# Patient Record
Sex: FEMALE | Race: OTHER | HISPANIC OR LATINO | URBAN - METROPOLITAN AREA
[De-identification: names, ages, dates, MRNs, and addresses within clinical notes are randomized per-mention and may not be internally consistent; named-entity substitution may affect disease eponyms.]

---

## 2017-08-04 ENCOUNTER — EMERGENCY (EMERGENCY)
Facility: HOSPITAL | Age: 67
LOS: 0 days | Discharge: HOME | End: 2017-08-05

## 2017-08-04 DIAGNOSIS — I10 ESSENTIAL (PRIMARY) HYPERTENSION: ICD-10-CM

## 2017-08-04 DIAGNOSIS — Z90.49 ACQUIRED ABSENCE OF OTHER SPECIFIED PARTS OF DIGESTIVE TRACT: ICD-10-CM

## 2017-08-04 DIAGNOSIS — R07.9 CHEST PAIN, UNSPECIFIED: ICD-10-CM

## 2017-08-04 DIAGNOSIS — Z79.899 OTHER LONG TERM (CURRENT) DRUG THERAPY: ICD-10-CM

## 2017-08-04 DIAGNOSIS — E11.9 TYPE 2 DIABETES MELLITUS WITHOUT COMPLICATIONS: ICD-10-CM

## 2017-08-04 DIAGNOSIS — J45.909 UNSPECIFIED ASTHMA, UNCOMPLICATED: ICD-10-CM

## 2017-08-04 DIAGNOSIS — J18.9 PNEUMONIA, UNSPECIFIED ORGANISM: ICD-10-CM

## 2017-08-04 DIAGNOSIS — Z79.82 LONG TERM (CURRENT) USE OF ASPIRIN: ICD-10-CM

## 2017-08-04 DIAGNOSIS — R50.9 FEVER, UNSPECIFIED: ICD-10-CM

## 2017-08-04 DIAGNOSIS — Z91.041 RADIOGRAPHIC DYE ALLERGY STATUS: ICD-10-CM

## 2017-08-04 DIAGNOSIS — R19.7 DIARRHEA, UNSPECIFIED: ICD-10-CM

## 2017-08-04 DIAGNOSIS — E78.00 PURE HYPERCHOLESTEROLEMIA, UNSPECIFIED: ICD-10-CM

## 2017-08-04 DIAGNOSIS — E78.5 HYPERLIPIDEMIA, UNSPECIFIED: ICD-10-CM

## 2017-08-04 DIAGNOSIS — Z88.8 ALLERGY STATUS TO OTHER DRUGS, MEDICAMENTS AND BIOLOGICAL SUBSTANCES STATUS: ICD-10-CM

## 2017-08-11 ENCOUNTER — INPATIENT (INPATIENT)
Facility: HOSPITAL | Age: 67
LOS: 2 days | Discharge: HOME | End: 2017-08-14
Attending: INTERNAL MEDICINE

## 2017-08-11 DIAGNOSIS — I10 ESSENTIAL (PRIMARY) HYPERTENSION: ICD-10-CM

## 2017-08-11 DIAGNOSIS — R07.9 CHEST PAIN, UNSPECIFIED: ICD-10-CM

## 2017-08-11 DIAGNOSIS — E78.5 HYPERLIPIDEMIA, UNSPECIFIED: ICD-10-CM

## 2017-08-11 DIAGNOSIS — E11.9 TYPE 2 DIABETES MELLITUS WITHOUT COMPLICATIONS: ICD-10-CM

## 2017-08-16 DIAGNOSIS — Z95.5 PRESENCE OF CORONARY ANGIOPLASTY IMPLANT AND GRAFT: ICD-10-CM

## 2017-08-16 DIAGNOSIS — Z79.84 LONG TERM (CURRENT) USE OF ORAL HYPOGLYCEMIC DRUGS: ICD-10-CM

## 2017-08-16 DIAGNOSIS — R19.7 DIARRHEA, UNSPECIFIED: ICD-10-CM

## 2017-08-16 DIAGNOSIS — Z91.041 RADIOGRAPHIC DYE ALLERGY STATUS: ICD-10-CM

## 2017-08-16 DIAGNOSIS — E78.5 HYPERLIPIDEMIA, UNSPECIFIED: ICD-10-CM

## 2017-08-16 DIAGNOSIS — E78.00 PURE HYPERCHOLESTEROLEMIA, UNSPECIFIED: ICD-10-CM

## 2017-08-16 DIAGNOSIS — I10 ESSENTIAL (PRIMARY) HYPERTENSION: ICD-10-CM

## 2017-08-16 DIAGNOSIS — E86.0 DEHYDRATION: ICD-10-CM

## 2017-08-16 DIAGNOSIS — I25.10 ATHEROSCLEROTIC HEART DISEASE OF NATIVE CORONARY ARTERY WITHOUT ANGINA PECTORIS: ICD-10-CM

## 2017-08-16 DIAGNOSIS — E03.9 HYPOTHYROIDISM, UNSPECIFIED: ICD-10-CM

## 2017-08-16 DIAGNOSIS — J18.9 PNEUMONIA, UNSPECIFIED ORGANISM: ICD-10-CM

## 2017-08-16 DIAGNOSIS — E83.42 HYPOMAGNESEMIA: ICD-10-CM

## 2017-08-16 DIAGNOSIS — K21.9 GASTRO-ESOPHAGEAL REFLUX DISEASE WITHOUT ESOPHAGITIS: ICD-10-CM

## 2017-08-16 DIAGNOSIS — E11.9 TYPE 2 DIABETES MELLITUS WITHOUT COMPLICATIONS: ICD-10-CM

## 2017-08-16 DIAGNOSIS — J45.909 UNSPECIFIED ASTHMA, UNCOMPLICATED: ICD-10-CM

## 2017-10-29 ENCOUNTER — INPATIENT (INPATIENT)
Facility: HOSPITAL | Age: 67
LOS: 3 days | Discharge: HOME | End: 2017-11-02
Attending: INTERNAL MEDICINE | Admitting: INTERNAL MEDICINE

## 2017-10-29 DIAGNOSIS — E11.9 TYPE 2 DIABETES MELLITUS WITHOUT COMPLICATIONS: ICD-10-CM

## 2017-10-29 DIAGNOSIS — R07.9 CHEST PAIN, UNSPECIFIED: ICD-10-CM

## 2017-10-29 DIAGNOSIS — E78.5 HYPERLIPIDEMIA, UNSPECIFIED: ICD-10-CM

## 2017-10-29 DIAGNOSIS — I10 ESSENTIAL (PRIMARY) HYPERTENSION: ICD-10-CM

## 2017-11-07 DIAGNOSIS — Z95.5 PRESENCE OF CORONARY ANGIOPLASTY IMPLANT AND GRAFT: ICD-10-CM

## 2017-11-07 DIAGNOSIS — E03.9 HYPOTHYROIDISM, UNSPECIFIED: ICD-10-CM

## 2017-11-07 DIAGNOSIS — K21.9 GASTRO-ESOPHAGEAL REFLUX DISEASE WITHOUT ESOPHAGITIS: ICD-10-CM

## 2017-11-07 DIAGNOSIS — R07.9 CHEST PAIN, UNSPECIFIED: ICD-10-CM

## 2017-11-07 DIAGNOSIS — I25.110 ATHEROSCLEROTIC HEART DISEASE OF NATIVE CORONARY ARTERY WITH UNSTABLE ANGINA PECTORIS: ICD-10-CM

## 2017-11-07 DIAGNOSIS — E78.5 HYPERLIPIDEMIA, UNSPECIFIED: ICD-10-CM

## 2017-11-07 DIAGNOSIS — E11.9 TYPE 2 DIABETES MELLITUS WITHOUT COMPLICATIONS: ICD-10-CM

## 2017-11-07 DIAGNOSIS — Z79.84 LONG TERM (CURRENT) USE OF ORAL HYPOGLYCEMIC DRUGS: ICD-10-CM

## 2017-11-07 DIAGNOSIS — I10 ESSENTIAL (PRIMARY) HYPERTENSION: ICD-10-CM

## 2017-11-07 DIAGNOSIS — I48.91 UNSPECIFIED ATRIAL FIBRILLATION: ICD-10-CM

## 2017-11-16 ENCOUNTER — EMERGENCY (EMERGENCY)
Facility: HOSPITAL | Age: 67
LOS: 0 days | Discharge: HOME | End: 2017-11-16
Admitting: INTERNAL MEDICINE

## 2017-11-16 DIAGNOSIS — Z90.49 ACQUIRED ABSENCE OF OTHER SPECIFIED PARTS OF DIGESTIVE TRACT: ICD-10-CM

## 2017-11-16 DIAGNOSIS — R07.9 CHEST PAIN, UNSPECIFIED: ICD-10-CM

## 2017-11-16 DIAGNOSIS — J18.9 PNEUMONIA, UNSPECIFIED ORGANISM: ICD-10-CM

## 2017-11-16 DIAGNOSIS — Z98.61 CORONARY ANGIOPLASTY STATUS: ICD-10-CM

## 2017-11-16 DIAGNOSIS — Z91.041 RADIOGRAPHIC DYE ALLERGY STATUS: ICD-10-CM

## 2017-11-16 DIAGNOSIS — Z88.8 ALLERGY STATUS TO OTHER DRUGS, MEDICAMENTS AND BIOLOGICAL SUBSTANCES STATUS: ICD-10-CM

## 2017-11-16 DIAGNOSIS — R05 COUGH: ICD-10-CM

## 2017-11-16 DIAGNOSIS — E11.9 TYPE 2 DIABETES MELLITUS WITHOUT COMPLICATIONS: ICD-10-CM

## 2017-11-16 DIAGNOSIS — Z79.899 OTHER LONG TERM (CURRENT) DRUG THERAPY: ICD-10-CM

## 2017-11-16 DIAGNOSIS — I10 ESSENTIAL (PRIMARY) HYPERTENSION: ICD-10-CM

## 2017-11-16 DIAGNOSIS — Z79.82 LONG TERM (CURRENT) USE OF ASPIRIN: ICD-10-CM

## 2017-11-16 DIAGNOSIS — J45.909 UNSPECIFIED ASTHMA, UNCOMPLICATED: ICD-10-CM

## 2017-11-16 DIAGNOSIS — E78.5 HYPERLIPIDEMIA, UNSPECIFIED: ICD-10-CM

## 2017-11-16 DIAGNOSIS — E78.00 PURE HYPERCHOLESTEROLEMIA, UNSPECIFIED: ICD-10-CM

## 2017-12-16 ENCOUNTER — EMERGENCY (EMERGENCY)
Facility: HOSPITAL | Age: 67
LOS: 0 days | Discharge: HOME | End: 2017-12-16
Admitting: INTERNAL MEDICINE

## 2017-12-16 DIAGNOSIS — Z91.041 RADIOGRAPHIC DYE ALLERGY STATUS: ICD-10-CM

## 2017-12-16 DIAGNOSIS — Z79.899 OTHER LONG TERM (CURRENT) DRUG THERAPY: ICD-10-CM

## 2017-12-16 DIAGNOSIS — R05 COUGH: ICD-10-CM

## 2017-12-16 DIAGNOSIS — J90 PLEURAL EFFUSION, NOT ELSEWHERE CLASSIFIED: ICD-10-CM

## 2017-12-16 DIAGNOSIS — R07.9 CHEST PAIN, UNSPECIFIED: ICD-10-CM

## 2017-12-16 DIAGNOSIS — Z79.82 LONG TERM (CURRENT) USE OF ASPIRIN: ICD-10-CM

## 2017-12-16 DIAGNOSIS — Z90.49 ACQUIRED ABSENCE OF OTHER SPECIFIED PARTS OF DIGESTIVE TRACT: ICD-10-CM

## 2017-12-16 DIAGNOSIS — E78.5 HYPERLIPIDEMIA, UNSPECIFIED: ICD-10-CM

## 2017-12-16 DIAGNOSIS — Z79.84 LONG TERM (CURRENT) USE OF ORAL HYPOGLYCEMIC DRUGS: ICD-10-CM

## 2017-12-16 DIAGNOSIS — I10 ESSENTIAL (PRIMARY) HYPERTENSION: ICD-10-CM

## 2017-12-16 DIAGNOSIS — E11.9 TYPE 2 DIABETES MELLITUS WITHOUT COMPLICATIONS: ICD-10-CM

## 2017-12-16 DIAGNOSIS — T46.4X5A ADVERSE EFFECT OF ANGIOTENSIN-CONVERTING-ENZYME INHIBITORS, INITIAL ENCOUNTER: ICD-10-CM

## 2017-12-16 DIAGNOSIS — J45.909 UNSPECIFIED ASTHMA, UNCOMPLICATED: ICD-10-CM

## 2017-12-16 DIAGNOSIS — Z88.8 ALLERGY STATUS TO OTHER DRUGS, MEDICAMENTS AND BIOLOGICAL SUBSTANCES: ICD-10-CM

## 2018-01-03 ENCOUNTER — OUTPATIENT (OUTPATIENT)
Dept: OUTPATIENT SERVICES | Facility: HOSPITAL | Age: 68
LOS: 1 days | Discharge: HOME | End: 2018-01-03

## 2018-01-03 DIAGNOSIS — E11.9 TYPE 2 DIABETES MELLITUS WITHOUT COMPLICATIONS: ICD-10-CM

## 2018-01-03 DIAGNOSIS — Z53.9 PROCEDURE AND TREATMENT NOT CARRIED OUT, UNSPECIFIED REASON: ICD-10-CM

## 2018-01-03 DIAGNOSIS — E78.5 HYPERLIPIDEMIA, UNSPECIFIED: ICD-10-CM

## 2018-01-03 DIAGNOSIS — R07.9 CHEST PAIN, UNSPECIFIED: ICD-10-CM

## 2018-01-03 DIAGNOSIS — J90 PLEURAL EFFUSION, NOT ELSEWHERE CLASSIFIED: ICD-10-CM

## 2018-01-03 DIAGNOSIS — I10 ESSENTIAL (PRIMARY) HYPERTENSION: ICD-10-CM

## 2018-02-22 ENCOUNTER — APPOINTMENT (OUTPATIENT)
Dept: CARDIOLOGY | Facility: CLINIC | Age: 68
End: 2018-02-22

## 2018-02-22 VITALS — WEIGHT: 182 LBS

## 2018-02-22 VITALS — SYSTOLIC BLOOD PRESSURE: 134 MMHG | BODY MASS INDEX: 34.39 KG/M2 | DIASTOLIC BLOOD PRESSURE: 90 MMHG | HEIGHT: 61 IN

## 2018-02-22 DIAGNOSIS — E03.9 HYPOTHYROIDISM, UNSPECIFIED: ICD-10-CM

## 2018-02-22 DIAGNOSIS — Z98.890 OTHER SPECIFIED POSTPROCEDURAL STATES: ICD-10-CM

## 2018-02-22 DIAGNOSIS — Z86.39 PERSONAL HISTORY OF OTHER ENDOCRINE, NUTRITIONAL AND METABOLIC DISEASE: ICD-10-CM

## 2018-02-22 DIAGNOSIS — C96.6 UNIFOCAL LANGERHANS-CELL HISTIOCYTOSIS: ICD-10-CM

## 2018-02-22 DIAGNOSIS — Z82.49 FAMILY HISTORY OF ISCHEMIC HEART DISEASE AND OTHER DISEASES OF THE CIRCULATORY SYSTEM: ICD-10-CM

## 2018-02-22 DIAGNOSIS — Z86.79 PERSONAL HISTORY OF OTHER DISEASES OF THE CIRCULATORY SYSTEM: ICD-10-CM

## 2018-04-05 ENCOUNTER — APPOINTMENT (OUTPATIENT)
Dept: CARDIOLOGY | Facility: CLINIC | Age: 68
End: 2018-04-05

## 2018-04-17 ENCOUNTER — APPOINTMENT (OUTPATIENT)
Dept: CARDIOLOGY | Facility: CLINIC | Age: 68
End: 2018-04-17

## 2018-05-14 ENCOUNTER — APPOINTMENT (OUTPATIENT)
Dept: CARDIOLOGY | Facility: CLINIC | Age: 68
End: 2018-05-14

## 2018-05-14 VITALS — SYSTOLIC BLOOD PRESSURE: 109 MMHG | DIASTOLIC BLOOD PRESSURE: 57 MMHG

## 2018-07-23 ENCOUNTER — INPATIENT (INPATIENT)
Facility: HOSPITAL | Age: 68
LOS: 2 days | Discharge: HOME | End: 2018-07-26
Attending: INTERNAL MEDICINE | Admitting: INTERNAL MEDICINE

## 2018-07-23 VITALS
OXYGEN SATURATION: 100 % | RESPIRATION RATE: 18 BRPM | TEMPERATURE: 96 F | DIASTOLIC BLOOD PRESSURE: 93 MMHG | HEART RATE: 79 BPM | SYSTOLIC BLOOD PRESSURE: 202 MMHG

## 2018-07-23 DIAGNOSIS — Z95.5 PRESENCE OF CORONARY ANGIOPLASTY IMPLANT AND GRAFT: Chronic | ICD-10-CM

## 2018-07-23 LAB
ALBUMIN SERPL ELPH-MCNC: 4.3 G/DL — SIGNIFICANT CHANGE UP (ref 3.5–5.2)
ALP SERPL-CCNC: 64 U/L — SIGNIFICANT CHANGE UP (ref 30–115)
ALT FLD-CCNC: 13 U/L — SIGNIFICANT CHANGE UP (ref 0–41)
ANION GAP SERPL CALC-SCNC: 13 MMOL/L — SIGNIFICANT CHANGE UP (ref 7–14)
APTT BLD: 31.9 SEC — SIGNIFICANT CHANGE UP (ref 27–39.2)
AST SERPL-CCNC: 20 U/L — SIGNIFICANT CHANGE UP (ref 0–41)
BASOPHILS # BLD AUTO: 0.04 K/UL — SIGNIFICANT CHANGE UP (ref 0–0.2)
BASOPHILS NFR BLD AUTO: 0.9 % — SIGNIFICANT CHANGE UP (ref 0–1)
BILIRUB SERPL-MCNC: 2.1 MG/DL — HIGH (ref 0.2–1.2)
BUN SERPL-MCNC: 24 MG/DL — HIGH (ref 10–20)
CALCIUM SERPL-MCNC: 9.9 MG/DL — SIGNIFICANT CHANGE UP (ref 8.5–10.1)
CHLORIDE SERPL-SCNC: 101 MMOL/L — SIGNIFICANT CHANGE UP (ref 98–110)
CK MB CFR SERPL CALC: 2.6 NG/ML — SIGNIFICANT CHANGE UP (ref 0.6–6.3)
CK SERPL-CCNC: 121 U/L — SIGNIFICANT CHANGE UP (ref 0–225)
CO2 SERPL-SCNC: 28 MMOL/L — SIGNIFICANT CHANGE UP (ref 17–32)
CREAT SERPL-MCNC: 0.9 MG/DL — SIGNIFICANT CHANGE UP (ref 0.7–1.5)
EOSINOPHIL # BLD AUTO: 0.18 K/UL — SIGNIFICANT CHANGE UP (ref 0–0.7)
EOSINOPHIL NFR BLD AUTO: 4.1 % — SIGNIFICANT CHANGE UP (ref 0–8)
GLUCOSE SERPL-MCNC: 122 MG/DL — HIGH (ref 70–99)
HCT VFR BLD CALC: 35 % — LOW (ref 37–47)
HGB BLD-MCNC: 12 G/DL — SIGNIFICANT CHANGE UP (ref 12–16)
IMM GRANULOCYTES NFR BLD AUTO: 0.2 % — SIGNIFICANT CHANGE UP (ref 0.1–0.3)
INR BLD: 1.01 RATIO — SIGNIFICANT CHANGE UP (ref 0.65–1.3)
LYMPHOCYTES # BLD AUTO: 1.98 K/UL — SIGNIFICANT CHANGE UP (ref 1.2–3.4)
LYMPHOCYTES # BLD AUTO: 45 % — SIGNIFICANT CHANGE UP (ref 20.5–51.1)
MCHC RBC-ENTMCNC: 30.7 PG — SIGNIFICANT CHANGE UP (ref 27–31)
MCHC RBC-ENTMCNC: 34.3 G/DL — SIGNIFICANT CHANGE UP (ref 32–37)
MCV RBC AUTO: 89.5 FL — SIGNIFICANT CHANGE UP (ref 81–99)
MONOCYTES # BLD AUTO: 0.45 K/UL — SIGNIFICANT CHANGE UP (ref 0.1–0.6)
MONOCYTES NFR BLD AUTO: 10.2 % — HIGH (ref 1.7–9.3)
NEUTROPHILS # BLD AUTO: 1.74 K/UL — SIGNIFICANT CHANGE UP (ref 1.4–6.5)
NEUTROPHILS NFR BLD AUTO: 39.6 % — LOW (ref 42.2–75.2)
NRBC # BLD: 0 /100 WBCS — SIGNIFICANT CHANGE UP (ref 0–0)
PLATELET # BLD AUTO: 259 K/UL — SIGNIFICANT CHANGE UP (ref 130–400)
POTASSIUM SERPL-MCNC: 4.7 MMOL/L — SIGNIFICANT CHANGE UP (ref 3.5–5)
POTASSIUM SERPL-SCNC: 4.7 MMOL/L — SIGNIFICANT CHANGE UP (ref 3.5–5)
PROT SERPL-MCNC: 7.6 G/DL — SIGNIFICANT CHANGE UP (ref 6–8)
PROTHROM AB SERPL-ACNC: 10.9 SEC — SIGNIFICANT CHANGE UP (ref 9.95–12.87)
RBC # BLD: 3.91 M/UL — LOW (ref 4.2–5.4)
RBC # FLD: 13.9 % — SIGNIFICANT CHANGE UP (ref 11.5–14.5)
SODIUM SERPL-SCNC: 142 MMOL/L — SIGNIFICANT CHANGE UP (ref 135–146)
TROPONIN T SERPL-MCNC: <0.01 NG/ML — SIGNIFICANT CHANGE UP
WBC # BLD: 4.4 K/UL — LOW (ref 4.8–10.8)
WBC # FLD AUTO: 4.4 K/UL — LOW (ref 4.8–10.8)

## 2018-07-23 RX ORDER — SODIUM CHLORIDE 9 MG/ML
1000 INJECTION INTRAMUSCULAR; INTRAVENOUS; SUBCUTANEOUS
Qty: 0 | Refills: 0 | Status: DISCONTINUED | OUTPATIENT
Start: 2018-07-23 | End: 2018-07-24

## 2018-07-23 RX ADMIN — SODIUM CHLORIDE 125 MILLILITER(S): 9 INJECTION INTRAMUSCULAR; INTRAVENOUS; SUBCUTANEOUS at 22:30

## 2018-07-23 NOTE — ED ADULT TRIAGE NOTE - CHIEF COMPLAINT QUOTE
witnessed syncope episode at home, now alert and oriented x 3, in no distress. witnessed syncope episode at home, now alert and oriented x 3, in no distress. [id number].

## 2018-07-23 NOTE — ED ADULT NURSE NOTE - PMH
Arthritis    Asthma    Atrial fibrillation    Coronary heart disease    Diabetes    GERD (gastroesophageal reflux disease)    High cholesterol    Hypertension    Hypothyroid

## 2018-07-23 NOTE — ED ADULT NURSE NOTE - OBJECTIVE STATEMENT
Patient states she was at home cooking with family and suddenly saw black and fell but  caught her. No injury posy syncope episode. Patient is a/o x3, ambulatory, currently attached to cardiac and spo2 monitor, patient has c/o pressure to front of head and mild light handedness. no distress at this time, will monitor.

## 2018-07-24 LAB
ANION GAP SERPL CALC-SCNC: 10 MMOL/L — SIGNIFICANT CHANGE UP (ref 7–14)
BASOPHILS # BLD AUTO: 0.02 K/UL — SIGNIFICANT CHANGE UP (ref 0–0.2)
BASOPHILS NFR BLD AUTO: 0.7 % — SIGNIFICANT CHANGE UP (ref 0–1)
BUN SERPL-MCNC: 17 MG/DL — SIGNIFICANT CHANGE UP (ref 10–20)
CALCIUM SERPL-MCNC: 8.9 MG/DL — SIGNIFICANT CHANGE UP (ref 8.5–10.1)
CHLORIDE SERPL-SCNC: 102 MMOL/L — SIGNIFICANT CHANGE UP (ref 98–110)
CO2 SERPL-SCNC: 28 MMOL/L — SIGNIFICANT CHANGE UP (ref 17–32)
CREAT SERPL-MCNC: 0.7 MG/DL — SIGNIFICANT CHANGE UP (ref 0.7–1.5)
EOSINOPHIL # BLD AUTO: 0.09 K/UL — SIGNIFICANT CHANGE UP (ref 0–0.7)
EOSINOPHIL NFR BLD AUTO: 3.2 % — SIGNIFICANT CHANGE UP (ref 0–8)
GLUCOSE SERPL-MCNC: 145 MG/DL — HIGH (ref 70–99)
HCT VFR BLD CALC: 32.2 % — LOW (ref 37–47)
HGB BLD-MCNC: 10.9 G/DL — LOW (ref 12–16)
IMM GRANULOCYTES NFR BLD AUTO: 0 % — LOW (ref 0.1–0.3)
LYMPHOCYTES # BLD AUTO: 1.2 K/UL — SIGNIFICANT CHANGE UP (ref 1.2–3.4)
LYMPHOCYTES # BLD AUTO: 43.2 % — SIGNIFICANT CHANGE UP (ref 20.5–51.1)
MCHC RBC-ENTMCNC: 30.4 PG — SIGNIFICANT CHANGE UP (ref 27–31)
MCHC RBC-ENTMCNC: 33.9 G/DL — SIGNIFICANT CHANGE UP (ref 32–37)
MCV RBC AUTO: 89.7 FL — SIGNIFICANT CHANGE UP (ref 81–99)
MONOCYTES # BLD AUTO: 0.33 K/UL — SIGNIFICANT CHANGE UP (ref 0.1–0.6)
MONOCYTES NFR BLD AUTO: 11.9 % — HIGH (ref 1.7–9.3)
NEUTROPHILS # BLD AUTO: 1.14 K/UL — LOW (ref 1.4–6.5)
NEUTROPHILS NFR BLD AUTO: 41 % — LOW (ref 42.2–75.2)
NRBC # BLD: 0 /100 WBCS — SIGNIFICANT CHANGE UP (ref 0–0)
PLATELET # BLD AUTO: 217 K/UL — SIGNIFICANT CHANGE UP (ref 130–400)
POTASSIUM SERPL-MCNC: 3.9 MMOL/L — SIGNIFICANT CHANGE UP (ref 3.5–5)
POTASSIUM SERPL-SCNC: 3.9 MMOL/L — SIGNIFICANT CHANGE UP (ref 3.5–5)
RBC # BLD: 3.59 M/UL — LOW (ref 4.2–5.4)
RBC # FLD: 13.6 % — SIGNIFICANT CHANGE UP (ref 11.5–14.5)
SODIUM SERPL-SCNC: 140 MMOL/L — SIGNIFICANT CHANGE UP (ref 135–146)
WBC # BLD: 2.78 K/UL — LOW (ref 4.8–10.8)
WBC # FLD AUTO: 2.78 K/UL — LOW (ref 4.8–10.8)

## 2018-07-24 RX ORDER — ATORVASTATIN CALCIUM 80 MG/1
80 TABLET, FILM COATED ORAL AT BEDTIME
Qty: 0 | Refills: 0 | Status: DISCONTINUED | OUTPATIENT
Start: 2018-07-24 | End: 2018-07-26

## 2018-07-24 RX ORDER — SOTALOL HCL 120 MG
80 TABLET ORAL ONCE
Qty: 0 | Refills: 0 | Status: COMPLETED | OUTPATIENT
Start: 2018-07-24 | End: 2018-07-24

## 2018-07-24 RX ORDER — PANTOPRAZOLE SODIUM 20 MG/1
40 TABLET, DELAYED RELEASE ORAL
Qty: 0 | Refills: 0 | Status: DISCONTINUED | OUTPATIENT
Start: 2018-07-24 | End: 2018-07-26

## 2018-07-24 RX ORDER — SOTALOL HCL 120 MG
80 TABLET ORAL
Qty: 0 | Refills: 0 | Status: DISCONTINUED | OUTPATIENT
Start: 2018-07-24 | End: 2018-07-25

## 2018-07-24 RX ORDER — ASPIRIN/CALCIUM CARB/MAGNESIUM 324 MG
81 TABLET ORAL DAILY
Qty: 0 | Refills: 0 | Status: DISCONTINUED | OUTPATIENT
Start: 2018-07-24 | End: 2018-07-26

## 2018-07-24 RX ORDER — HEPARIN SODIUM 5000 [USP'U]/ML
5000 INJECTION INTRAVENOUS; SUBCUTANEOUS EVERY 8 HOURS
Qty: 0 | Refills: 0 | Status: DISCONTINUED | OUTPATIENT
Start: 2018-07-24 | End: 2018-07-26

## 2018-07-24 RX ORDER — LEVOTHYROXINE SODIUM 125 MCG
88 TABLET ORAL DAILY
Qty: 0 | Refills: 0 | Status: DISCONTINUED | OUTPATIENT
Start: 2018-07-24 | End: 2018-07-26

## 2018-07-24 RX ORDER — LOSARTAN POTASSIUM 100 MG/1
50 TABLET, FILM COATED ORAL DAILY
Qty: 0 | Refills: 0 | Status: DISCONTINUED | OUTPATIENT
Start: 2018-07-24 | End: 2018-07-25

## 2018-07-24 RX ORDER — ACETAMINOPHEN 500 MG
650 TABLET ORAL EVERY 6 HOURS
Qty: 0 | Refills: 0 | Status: DISCONTINUED | OUTPATIENT
Start: 2018-07-24 | End: 2018-07-26

## 2018-07-24 RX ADMIN — HEPARIN SODIUM 5000 UNIT(S): 5000 INJECTION INTRAVENOUS; SUBCUTANEOUS at 17:34

## 2018-07-24 RX ADMIN — Medication 650 MILLIGRAM(S): at 21:25

## 2018-07-24 RX ADMIN — PANTOPRAZOLE SODIUM 40 MILLIGRAM(S): 20 TABLET, DELAYED RELEASE ORAL at 08:23

## 2018-07-24 RX ADMIN — Medication 81 MILLIGRAM(S): at 11:21

## 2018-07-24 RX ADMIN — HEPARIN SODIUM 5000 UNIT(S): 5000 INJECTION INTRAVENOUS; SUBCUTANEOUS at 21:25

## 2018-07-24 RX ADMIN — Medication 80 MILLIGRAM(S): at 02:45

## 2018-07-24 RX ADMIN — Medication 88 MICROGRAM(S): at 06:29

## 2018-07-24 RX ADMIN — Medication 650 MILLIGRAM(S): at 23:07

## 2018-07-24 RX ADMIN — Medication 80 MILLIGRAM(S): at 17:34

## 2018-07-24 RX ADMIN — HEPARIN SODIUM 5000 UNIT(S): 5000 INJECTION INTRAVENOUS; SUBCUTANEOUS at 06:30

## 2018-07-24 RX ADMIN — LOSARTAN POTASSIUM 50 MILLIGRAM(S): 100 TABLET, FILM COATED ORAL at 06:29

## 2018-07-24 RX ADMIN — ATORVASTATIN CALCIUM 80 MILLIGRAM(S): 80 TABLET, FILM COATED ORAL at 21:25

## 2018-07-24 NOTE — PROGRESS NOTE ADULT - ASSESSMENT
Episode of Syncope, R/O cardiac vs neuro etiology vs vasovagal  Episodes of epistaxis, Eliquis on hold  Hx of HTN, ASHD, CAD sp stent, parox afib, + AC with Eliquis  Hx of Hyperlipidemia  Hx of Hypothyroidism  Hx of DM II  Hx of POA, DDD, DJD    place pt on telemetry, CE, ECHO  cardio consult  check orthostatics, stool guiac, TSH  CT of head shows white mater changes and no acute intracranial pathology  Neuro consult ? further w/up  monitor H/H and Plts  Eliquis on hold  cont all meds  NB:  pt is a [employer]/ no blood or blood products

## 2018-07-24 NOTE — ED PROVIDER NOTE - OBJECTIVE STATEMENT
67 yo female hx of CAD/Arthritis/HTN/HLD/Hypothyroid/ GERD/ DM/A.fib on Eliquis present c/o syncope episode this evening. as per family who witnessed the episode, patient was cooking and suddenly collapsed. they were able to catch her so she didn't fall and hurt herself. patient stated she felt lightheaded and headache and then she blacked out for few seconds. + nausea and HA after episode. denies vomiting/seizure activity/incontinence.   Denies fever/chill/dizziness/chest pain/palpitation/sob/abd pain/v/d/ black stool/bloody stool/urinary sxs

## 2018-07-24 NOTE — BLOOD PRODUCT ACCEPTANCE FORM - [id number]_A_OB NEG
No Cryoprecipitate/No Fresh Frozen Plasma/No Autologous Banked Blood/No Red Blood Cells/No Platelets

## 2018-07-24 NOTE — PROGRESS NOTE ADULT - SUBJECTIVE AND OBJECTIVE BOX
DERRICK DORMAN 69yo WF W Female brought by family to the ER after a witness episode of syncope.  The pt was standing in the kitchen when she felt faint and was caught by family member, thus there was no traume.  The  pt had LOC for less than 1 min, came to and was able to hear voices but could not speak.  The pt denies CP, palp, SOB or loss of sphincter tone.  The family states there were no clonic tonic limb movements.  The pt had episodes of epistaxis and was told to hold the eliquis x 1 week by cardiologist.  The PMHx includes:  HTN, ASHD, CAD, sp stent, afib, AC with Eliquis, Hyperlipidemia, DM II, Hypothyroidism, OA, DDD, DJD.    INTERVAL HPI/OVERNIGHT EVENTS:    MEDICATIONS  (STANDING):  aspirin enteric coated 81 milliGRAM(s) Oral daily  atorvastatin 80 milliGRAM(s) Oral at bedtime  heparin  Injectable 5000 Unit(s) SubCutaneous every 8 hours  levothyroxine 88 MICROGram(s) Oral daily  losartan 50 milliGRAM(s) Oral daily  pantoprazole    Tablet 40 milliGRAM(s) Oral before breakfast  sotalol 80 milliGRAM(s) Oral two times a day    MEDICATIONS  (PRN):  acetaminophen   Tablet. 650 milliGRAM(s) Oral every 6 hours PRN Moderate Pain (4 - 6)      Allergies    IV Contrast (Hives)    NB:  pt is a [employer] Witness/no blood or blood products	    Vital Signs Last 24 Hrs  T(C): 36.1 (24 Jul 2018 19:56), Max: 36.7 (24 Jul 2018 17:26)  T(F): 96.9 (24 Jul 2018 19:56), Max: 98 (24 Jul 2018 17:26)  HR: 78 (24 Jul 2018 17:26) (68 - 78)  BP: 138/65 (24 Jul 2018 17:26) (138/65 - 187/91)  BP(mean): --  RR: 18 (24 Jul 2018 19:56) (17 - 18)  SpO2: 99% (24 Jul 2018 17:26) (99% - 100%)    PHYSICAL EXAM:      Constitutional: pt is alert, oriented and in NAD    Eyes:  nor,al    ENMT:  dry oral mucosa    Neck:  supple, no JVD or bruits    Respiratory:  shallow respirations, no crackles, rales or wheezing    Cardiovascular:  S1S2 reg    Gastrointestinal:  globose soft and benign    Extremities:  moves all ext, + arthritic changes    Neurological:  nofocaldeficits    Skin:  normal turgor    Lymph Nodes:  not enlarged        LABS:                        10.9   2.78  )-----------( 217      ( 24 Jul 2018 11:28 ), on admission H/H 12/35, PLTS 259             32.2     07-24    140  |  102  |  17  ----------------------------<  145<H>  3.9   |  28  |  0.7    Ca    8.9      24 Jul 2018 11:28    TPro  7.6  /  Alb  4.3  /  TBili  2.1<H>  /  DBili  x   /  AST  20  /  ALT  13  /  AlkPhos  64  07-23    PT/INR - ( 23 Jul 2018 21:59 )   PT: 10.90 sec;   INR: 1.01 ratio         PTT - ( 23 Jul 2018 21:59 )  PTT:31.9 sec      RADIOLOGY & ADDITIONAL TESTS:    CXR:  poor inspiratory effort, low lung volumes, no consolidation    CT of the Head:  white matter changes, no acute pathology    EKG:  NSR 76/min, PRWP

## 2018-07-24 NOTE — H&P ADULT - ASSESSMENT
68 y/f with PMH of Atrial fibrillation on Eliquis, CAD s/p stent, hypothyroidism, DM, HTN was brought in by family after an episode of syncope at home.        # Syncopal episode rule out Arrhythmia vs Vasovagal syncope:  EKG: .........., CE 1st set -ve, will f/u second set, Admit to telemetry  CT head: No acute evidence of acute pathology  Was having some nasal bleeding, but was not severe enough to cause orthostatic hypotension, no dizziness/palpitation, Hb at baseline  Will do orthostatic vitals , also less likely to be +ve      # Epistaxis exacerbated by Eliquis:  off eliquis for 1 week as recommended by her cardiologist, will hold off for now until seen by her cardiologist  hemoglobin at [facility]    # CAD s/p stent:  c/w Aspirin, losartan, lipitor, sotalol    # Atrial fibrillation  c/w sotalol  Hold off on eliquis until seen by cardiologist    # DM:   Monitor FS and start insulin if >180    # GERD:  protonix    # DVT ppx:  heparin sq for now, may discontinue once eliquis is resumed    # Diet:  DASH    # Code : full 68 y/f with PMH of Atrial fibrillation on Eliquis, CAD s/p stent, hypothyroidism, DM, HTN was brought in by family after an episode of syncope at home.        # Syncopal episode rule out Arrhythmia vs Vasovagal syncope:  EKG: .........., CE 1st set -ve, will f/u second set, Admit to telemetry  CT head: No acute evidence of acute pathology  Was having some nasal bleeding, but was not severe enough to cause orthostatic hypotension, no dizziness/palpitation, Hb at baseline  Will do orthostatic vitals , also less likely to be +ve      # Epistaxis exacerbated by Eliquis:  off eliquis for 1 week as recommended by her cardiologist, will hold off for now until seen by her cardiologist  hemoglobin at [facility]    # CAD s/p stent:  c/w Aspirin, losartan, lipitor, sotalol    # Atrial fibrillation  c/w sotalol  Hold off on eliquis until seen by cardiologist    # DM:   Monitor FS and start insulin if >180    # GERD:  protonix    # Hypothyroidism:  Synthroid  f/u TSH    # DVT ppx:  heparin sq for now, may discontinue once eliquis is resumed    # Diet:  DASH    # Code : full 68 y/f with PMH of Atrial fibrillation on Eliquis, CAD s/p stent, hypothyroidism, DM, HTN was brought in by family after an episode of syncope at home.        # Syncopal episode rule out Arrhythmia vs Vasovagal syncope:  EKG: sinus rhythm, no ST wave changes, CE 1st set -ve, will f/u second set, Admit to telemetry  CT head: No acute evidence of acute pathology  Was having some nasal bleeding, but was not severe enough to cause orthostatic hypotension, no dizziness/palpitation, Hb at baseline  Will do orthostatic vitals , also less likely to be +ve      # Epistaxis exacerbated by Eliquis:  off eliquis for 1 week as recommended by her cardiologist, will hold off for now until seen by her cardiologist  hemoglobin at [facility]    # CAD s/p stent:  c/w Aspirin, losartan, lipitor, sotalol    # Atrial fibrillation  c/w sotalol  Hold off on eliquis until seen by cardiologist    # DM:   Monitor FS and start insulin if >180    # GERD:  protonix    # Hypothyroidism:  Synthroid  f/u TSH    # DVT ppx:  heparin sq for now, may discontinue once eliquis is resumed    # Diet:  DASH    # Code : full

## 2018-07-24 NOTE — ED PROVIDER NOTE - PROGRESS NOTE DETAILS
D/w Dr Hawkins and aware of admission. Dr Edouard for cardio consult Patient she missed her evening Sotolol dose. BP - 192/92. will order a dose in ED now.

## 2018-07-24 NOTE — PROVIDER CONTACT NOTE (OTHER) - ASSESSMENT
Patient is asymptomatic. No complaints of any discomfort. Patient is a Jehovah Witness and was consulted. Patient refuses any blood products to be administered if need be. As per patient she states : she has had low levels since stent was placed in past".

## 2018-07-24 NOTE — ED PROVIDER NOTE - NS ED ROS FT
Constitutional: no fever, chills, no recent weight loss, change in appetite or malaise  Eyes: no redness/discharge/pain/vision changes  ENT: no rhinorrhea/ear pain/sore throat  Cardiac: + Syncope. No chest pain, SOB or edema.  Respiratory: No cough or respiratory distress  GI: + nausea,No vomiting, diarrhea or abdominal pain.  : No dysuria, frequency, urgency or hematuria  MS: no pain to back or extremities, no loss of ROM, no weakness  Neuro: + headache No weakness. No LOC.  Skin: No skin rash.  Endocrine: No history of thyroid disease or diabetes.  Except as documented in the HPI, all other systems are negative.

## 2018-07-24 NOTE — ED PROVIDER NOTE - PHYSICAL EXAMINATION
CONSTITUTIONAL: Well-appearing; well-nourished; in no apparent distress.   EYES: PERRL; EOM intact.   ENT: normal nose; no rhinorrhea; normal pharynx with no tonsillar hypertrophy.   NECK: Supple; non-tender; no cervical lymphadenopathy. No JVD.   CARDIOVASCULAR: Normal S1, S2; no murmurs, rubs, or gallops.   RESPIRATORY: Normal chest excursion with respiration; breath sounds clear and equal bilaterally; no wheezes, rhonchi, or rales.  GI/: Normal bowel sounds; non-distended; non-tender; no palpable organomegaly.   MS: No evidence of trauma or deformity. Non-tender to palpation. No scoliosis. No CVA tenderness. Normal ROM in all four extremities; non-tender to palpation; distal pulses are normal.   SKIN: Normal for age and race; warm; dry; good turgor; no apparent lesions or exudate.   NEURO/PSYCH: A & O x 4; CN II-XII grossly unremarkable. mood and manner are appropriate. Grooming and personal hygiene are appropriate. No apparent thoughts of harm to self or others. CONSTITUTIONAL: Well-appearing; well-nourished; in no apparent distress.   EYES: PERRL; EOM intact.   ENT: normal nose; no rhinorrhea; normal pharynx with no tonsillar hypertrophy.   NECK: Supple; non-tender; no cervical lymphadenopathy. No JVD.   CARDIOVASCULAR: Normal S1, S2; no murmurs, rubs, or gallops.   RESPIRATORY: Normal chest excursion with respiration; breath sounds clear and equal bilaterally; no wheezes, rhonchi, or rales.  GI/: Normal bowel sounds; non-distended; non-tender; no palpable organomegaly. rectal exam: Brown stool, guaiac negative  MS: No evidence of trauma or deformity. Non-tender to palpation. No scoliosis. No CVA tenderness. Normal ROM in all four extremities; non-tender to palpation; distal pulses are normal.   SKIN: Normal for age and race; warm; dry; good turgor; no apparent lesions or exudate.   NEURO/PSYCH: A & O x 4; CN II-XII grossly unremarkable. mood and manner are appropriate. Grooming and personal hygiene are appropriate. No apparent thoughts of harm to self or others.

## 2018-07-24 NOTE — ED ADULT NURSE REASSESSMENT NOTE - NS ED NURSE REASSESS COMMENT FT1
Patient is currently sleeping, attached tp cardiac and spo2 monitor. Patient arousal upon voice, no distress at this time, will continue to monitor.

## 2018-07-24 NOTE — H&P ADULT - PMH
Arthritis    Asthma    Atrial fibrillation    Coronary heart disease    Diabetes    GERD (gastroesophageal reflux disease)    High cholesterol    Hypertension    Hypothyroid Arthritis    Atrial fibrillation    Coronary heart disease    Diabetes    GERD (gastroesophageal reflux disease)    High cholesterol    Hypertension    Hypothyroid

## 2018-07-24 NOTE — ED PROVIDER NOTE - ATTENDING CONTRIBUTION TO CARE
68y f h/o AF on elliquis, cad/stents on asa, dm, htn, hl, hypothyroidism p/w episode of syncope pta. State she was standing in her kitchen cooking when she suddenly felt lightheaded and collapsed. Witnessed by her family who were standing nearby and caught her, denies head trauma. Now c/o HA & nausea. Denies  dizziness, cp/sob, palpitations, diaphoresis, v/d, abd pain, edema. Also rpts recent frequent nosebleeds and dark stools. PE: nad, mild pallor, ncat, neck supple, reg rate nl s1s2 no mrg, ctab no wrr, abd soft ntnd no palpable masses no rgr, rectal- as per mlp bote, note cvat, ext nl. a/p: SEE MDM

## 2018-07-24 NOTE — H&P ADULT - HISTORY OF PRESENT ILLNESS
68 y/f with PMH of Atrial fibrillation on Eliquis, CAD s/p stent, hypothyroidism, DM, HTN was brought in by family after an episode of syncope at home. She was standing in the kitchen when she had this episode, for few seconds she blacked out but then after that she could hear her family but couldn't respond. This whole episode lasted less than a minute. She was about to fall but her family supported her, so didn't had any injury. Denies having any dizziness, light headedness prior to fall. No abnormal body movments, uprolling of eyes, loss of bladder and bowel movement during the episode, complains of having headache after the episode and felt shakiness, but no focal weakness. No similar episode in the past.   Was having on and off nasal bleed, for last 1 month, last episode being 3 days back mild in amount, denies having any dizziness or palpitation, Her cardiologist recommended to hold Eliquis for 1 week, was supposed to resume on Tuesday ( tomorrow).  NO complains of chest pain, shortness of breath, belly pain. Normal urine, mentions she is mostly constipated, her last BM was one day back.

## 2018-07-24 NOTE — H&P ADULT - NSHPPHYSICALEXAM_GEN_ALL_CORE
T(C): 35.7 (07-23-18 @ 20:58), Max: 35.7 (07-23-18 @ 20:58)  HR: 82 (07-23-18 @ 21:05) (79 - 82)  BP: 191/92 (07-23-18 @ 21:05) (191/92 - 202/93)  RR: 18 (07-23-18 @ 20:58) (18 - 18)  SpO2: 100% (07-23-18 @ 20:58) (100% - 100%)  PHYSICAL EXAM:    Constitutional:    Eyes:    ENMT:    Neck:    Respiratory:    Cardiovascular:    Gastrointestinal:    Genitourinary:    Rectal:    Extremities:    Neurological: T(C): 35.7 (07-23-18 @ 20:58), Max: 35.7 (07-23-18 @ 20:58)  HR: 82 (07-23-18 @ 21:05) (79 - 82)  BP: 191/92 (07-23-18 @ 21:05) (191/92 - 202/93)  RR: 18 (07-23-18 @ 20:58) (18 - 18)  SpO2: 100% (07-23-18 @ 20:58) (100% - 100%)  PHYSICAL EXAM:    Constitutional: Well built female no acute distress    Eyes: Pupils symmetrical, reactive to light    ENMT: No facial asymmetry    Respiratory: b/l clear, no crackles    Cardiovascular: REgular rate and rhythm, no murmur    Gastrointestinal: soft non tender, no organomegaly    Extremities: no edema    Neurological: AO x 3, non focal

## 2018-07-24 NOTE — H&P ADULT - NSHPLABSRESULTS_GEN_ALL_CORE
12.0   4.40  )-----------( 259      ( 23 Jul 2018 21:59 )             35.0       07-23    142  |  101  |  24<H>  ----------------------------<  122<H>  4.7   |  28  |  0.9    Ca    9.9      23 Jul 2018 21:59    TPro  7.6  /  Alb  4.3  /  TBili  2.1<H>  /  DBili  x   /  AST  20  /  ALT  13  /  AlkPhos  64  07-23

## 2018-07-25 DIAGNOSIS — R55 SYNCOPE AND COLLAPSE: ICD-10-CM

## 2018-07-25 DIAGNOSIS — I10 ESSENTIAL (PRIMARY) HYPERTENSION: ICD-10-CM

## 2018-07-25 LAB
ANION GAP SERPL CALC-SCNC: 11 MMOL/L — SIGNIFICANT CHANGE UP (ref 7–14)
BUN SERPL-MCNC: 18 MG/DL — SIGNIFICANT CHANGE UP (ref 10–20)
CALCIUM SERPL-MCNC: 9.1 MG/DL — SIGNIFICANT CHANGE UP (ref 8.5–10.1)
CHLORIDE SERPL-SCNC: 105 MMOL/L — SIGNIFICANT CHANGE UP (ref 98–110)
CK MB CFR SERPL CALC: 1.6 NG/ML — SIGNIFICANT CHANGE UP (ref 0.6–6.3)
CO2 SERPL-SCNC: 27 MMOL/L — SIGNIFICANT CHANGE UP (ref 17–32)
CREAT SERPL-MCNC: 0.7 MG/DL — SIGNIFICANT CHANGE UP (ref 0.7–1.5)
GLUCOSE SERPL-MCNC: 130 MG/DL — HIGH (ref 70–99)
HCT VFR BLD CALC: 33.4 % — LOW (ref 37–47)
HGB BLD-MCNC: 11.3 G/DL — LOW (ref 12–16)
MCHC RBC-ENTMCNC: 30 PG — SIGNIFICANT CHANGE UP (ref 27–31)
MCHC RBC-ENTMCNC: 33.8 G/DL — SIGNIFICANT CHANGE UP (ref 32–37)
MCV RBC AUTO: 88.6 FL — SIGNIFICANT CHANGE UP (ref 81–99)
NRBC # BLD: 0 /100 WBCS — SIGNIFICANT CHANGE UP (ref 0–0)
PLATELET # BLD AUTO: 229 K/UL — SIGNIFICANT CHANGE UP (ref 130–400)
POTASSIUM SERPL-MCNC: 3.9 MMOL/L — SIGNIFICANT CHANGE UP (ref 3.5–5)
POTASSIUM SERPL-SCNC: 3.9 MMOL/L — SIGNIFICANT CHANGE UP (ref 3.5–5)
RBC # BLD: 3.77 M/UL — LOW (ref 4.2–5.4)
RBC # FLD: 13.6 % — SIGNIFICANT CHANGE UP (ref 11.5–14.5)
SODIUM SERPL-SCNC: 143 MMOL/L — SIGNIFICANT CHANGE UP (ref 135–146)
TROPONIN T SERPL-MCNC: <0.01 NG/ML — SIGNIFICANT CHANGE UP
WBC # BLD: 3.34 K/UL — LOW (ref 4.8–10.8)
WBC # FLD AUTO: 3.34 K/UL — LOW (ref 4.8–10.8)

## 2018-07-25 RX ORDER — LOSARTAN POTASSIUM 100 MG/1
100 TABLET, FILM COATED ORAL AT BEDTIME
Qty: 0 | Refills: 0 | Status: DISCONTINUED | OUTPATIENT
Start: 2018-07-26 | End: 2018-07-26

## 2018-07-25 RX ORDER — LOSARTAN POTASSIUM 100 MG/1
50 TABLET, FILM COATED ORAL AT BEDTIME
Qty: 0 | Refills: 0 | Status: COMPLETED | OUTPATIENT
Start: 2018-07-25 | End: 2018-07-25

## 2018-07-25 RX ADMIN — ATORVASTATIN CALCIUM 80 MILLIGRAM(S): 80 TABLET, FILM COATED ORAL at 21:10

## 2018-07-25 RX ADMIN — Medication 81 MILLIGRAM(S): at 12:43

## 2018-07-25 RX ADMIN — LOSARTAN POTASSIUM 50 MILLIGRAM(S): 100 TABLET, FILM COATED ORAL at 21:10

## 2018-07-25 RX ADMIN — HEPARIN SODIUM 5000 UNIT(S): 5000 INJECTION INTRAVENOUS; SUBCUTANEOUS at 13:13

## 2018-07-25 RX ADMIN — PANTOPRAZOLE SODIUM 40 MILLIGRAM(S): 20 TABLET, DELAYED RELEASE ORAL at 06:07

## 2018-07-25 RX ADMIN — HEPARIN SODIUM 5000 UNIT(S): 5000 INJECTION INTRAVENOUS; SUBCUTANEOUS at 06:07

## 2018-07-25 RX ADMIN — Medication 88 MICROGRAM(S): at 06:07

## 2018-07-25 RX ADMIN — LOSARTAN POTASSIUM 50 MILLIGRAM(S): 100 TABLET, FILM COATED ORAL at 06:07

## 2018-07-25 RX ADMIN — HEPARIN SODIUM 5000 UNIT(S): 5000 INJECTION INTRAVENOUS; SUBCUTANEOUS at 21:10

## 2018-07-25 NOTE — PROGRESS NOTE ADULT - SUBJECTIVE AND OBJECTIVE BOX
SUBJECTIVE:    Patient is a 68y old Female who presents with a chief complaint of syncope (24 Jul 2018 02:41)    Currently admitted to medicine with the primary diagnosis of Syncope and collapse     Today is hospital day 1d. This morning she is resting comfortably in bed and reports no new issues or overnight events.     PAST MEDICAL & SURGICAL HISTORY  Atrial fibrillation  Arthritis  GERD (gastroesophageal reflux disease)  High cholesterol  Hypertension  Hypothyroid  Coronary heart disease  Diabetes  H/O right coronary artery stent placement    SOCIAL HISTORY:  Negative for smoking/alcohol/drug use.     ALLERGIES:  IV Contrast (Hives)    MEDICATIONS:  STANDING MEDICATIONS  aspirin enteric coated 81 milliGRAM(s) Oral daily  atorvastatin 80 milliGRAM(s) Oral at bedtime  heparin  Injectable 5000 Unit(s) SubCutaneous every 8 hours  levothyroxine 88 MICROGram(s) Oral daily  losartan 50 milliGRAM(s) Oral daily  pantoprazole    Tablet 40 milliGRAM(s) Oral before breakfast  sotalol 80 milliGRAM(s) Oral two times a day    PRN MEDICATIONS  acetaminophen   Tablet. 650 milliGRAM(s) Oral every 6 hours PRN    VITALS:   T(F): 97.1  HR: 62  BP: 138/65  RR: 18  SpO2: 98%    LABS:                        11.3   3.34  )-----------( 229      ( 25 Jul 2018 06:46 )             33.4     07-25    143  |  105  |  18  ----------------------------<  130<H>  3.9   |  27  |  0.7    Ca    9.1      25 Jul 2018 06:46    TPro  7.6  /  Alb  4.3  /  TBili  2.1<H>  /  DBili  x   /  AST  20  /  ALT  13  /  AlkPhos  64  07-23    PT/INR - ( 23 Jul 2018 21:59 )   PT: 10.90 sec;   INR: 1.01 ratio         PTT - ( 23 Jul 2018 21:59 )  PTT:31.9 sec      Troponin T, Serum: <0.01 ng/mL (07-25-18 @ 06:46)      CARDIAC MARKERS ( 25 Jul 2018 06:46 )  x     / <0.01 ng/mL / x     / x     / 1.6 ng/mL  CARDIAC MARKERS ( 23 Jul 2018 21:59 )  x     / <0.01 ng/mL / 121 U/L / x     / 2.6 ng/mL      Troponin T, Serum: <0.01 ng/mL (07-25-18 @ 06:46)  Troponin T, Serum: <0.01 ng/mL (07-23-18 @ 21:59)      PHYSICAL EXAM:  GENERAL: NAD, well-groomed, well-developed  HEAD:  NCAT  NERVOUS SYSTEM: AAOX4  CHEST/LUNG: CTA b/l no w/r/r  HEART: +s1s2 RRR no m/g/r  ABDOMEN: soft, NT/ND (+) bs, no HSM  EXTREMITIES:  2+ Peripheral Pulses, No c/c/e

## 2018-07-25 NOTE — CONSULT NOTE ADULT - SUBJECTIVE AND OBJECTIVE BOX
HPI:  68 y/f with PMH of Atrial fibrillation on Eliquis, CAD s/p stent, hypothyroidism, DM, HTN was brought in by family after an episode of syncope at home. She was standing in the kitchen when she had this episode, for few seconds she blacked out but then after that she could hear her family but couldn't respond. This whole episode lasted less than a minute. She was about to fall but her family supported her, so didn't had any injury. Denies having any dizziness, light headedness prior to fall. No abnormal body movments, uprolling of eyes, loss of bladder and bowel movement during the episode, complains of having headache after the episode and felt shakiness, but no focal weakness. No similar episode in the past.   Was having on and off nasal bleed, for last 1 month, last episode being 3 days back mild in amount, denies having any dizziness or palpitation, Her cardiologist recommended to hold Eliquis for 1 week, was supposed to resume on Tuesday ( tomorrow).  NO complains of chest pain, shortness of breath, belly pain. Normal urine, mentions she is mostly constipated, her last BM was one day back. (2018 02:41)      Patient is a 68y old  Female who presents with a chief complaint of syncope (2018 02:41)        PAST MEDICAL & SURGICAL HISTORY:  Atrial fibrillation  Arthritis  GERD (gastroesophageal reflux disease)  High cholesterol  Hypertension  Hypothyroid  Coronary heart disease  Diabetes  H/O right coronary artery stent placement                  PREVIOUS DIAGNOSTIC TESTING:      ECHO   FINDINGS:     STRESS  FINDINGS:    CATHETERIZATION  FINDINGS:    ELECTROPHYSIOLOGY STUDY  FINDINGS:    CAROTID ULTRASOUND:  FINDINGS    VENOUS DUPLEX SCAN:  FINDINGS:    CHEST CT PULMONARY ANGIO with IV Contrast:  FINDINGS:  MEDICATIONS  (STANDING):  aspirin enteric coated 81 milliGRAM(s) Oral daily  atorvastatin 80 milliGRAM(s) Oral at bedtime  heparin  Injectable 5000 Unit(s) SubCutaneous every 8 hours  levothyroxine 88 MICROGram(s) Oral daily  losartan 50 milliGRAM(s) Oral at bedtime  pantoprazole    Tablet 40 milliGRAM(s) Oral before breakfast    MEDICATIONS  (PRN):  acetaminophen   Tablet. 650 milliGRAM(s) Oral every 6 hours PRN Moderate Pain (4 - 6)   Home Medications:  Aspir 81 oral delayed release tablet: 1 tab(s) orally once a day (2018 03:42)  atorvastatin 80 mg oral tablet: 1 tab(s) orally once a day (2018 03:42)  Eliquis 5 mg oral tablet: 1 tab(s) orally 2 times a day (2018 03:42)  levothyroxine 88 mcg (0.088 mg) oral tablet: 1 tab(s) orally once a day (2018 03:42)  losartan 50 mg oral tablet: 1 tab(s) orally once a day (2018 03:42)  metFORMIN 1000 mg oral tablet: 1 tab(s) orally 2 times a day (2018 03:42)  pantoprazole 40 mg oral delayed release tablet: 1 tab(s) orally once a day (2018 03:42)  sotalol 80 mg oral tablet: 1 tab(s) orally 2 times a day (2018 03:42)  Victoza: subcutaneous once a day (2018 03:42)      FAMILY HISTORY:  No pertinent family history in first degree relatives      SOCIAL HISTORY:    CIGARETTES:    ALCOHOL:          Vital Signs Last 24 Hrs  T(C): 36.4 (2018 14:51), Max: 36.4 (2018 14:51)  T(F): 97.6 (2018 14:51), Max: 97.6 (2018 14:51)  HR: 62 (2018 05:13) (62 - 62)  BP: --  BP(mean): --  RR: 18 (2018 19:56) (18 - 18)  SpO2: 98% (2018 22:09) (98% - 98%)          INTERPRETATION OF TELEMETRY:    ECG:        LABS:                        11.3   3.34  )-----------( 229      ( 2018 06:46 )             33.4     07-    143  |  105  |  18  ----------------------------<  130<H>  3.9   |  27  |  0.7    Ca    9.1      2018 06:46    TPro  7.6  /  Alb  4.3  /  TBili  2.1<H>  /  DBili  x   /  AST  20  /  ALT  13  /  AlkPhos  64  07-23    CARDIAC MARKERS ( 2018 06:46 )  x     / <0.01 ng/mL / x     / x     / 1.6 ng/mL  CARDIAC MARKERS ( 2018 21:59 )  x     / <0.01 ng/mL / 121 U/L / x     / 2.6 ng/mL      PT/INR - ( 2018 21:59 )   PT: 10.90 sec;   INR: 1.01 ratio         PTT - ( 2018 21:59 )  PTT:31.9 sec  LIVER FUNCTIONS - ( 2018 21:59 )  Alb: 4.3 g/dL / Pro: 7.6 g/dL / ALK PHOS: 64 U/L / ALT: 13 U/L / AST: 20 U/L / GGT: x               BNP  I&O's Detail    Daily     Daily Weight in k (2018 06:38)    RADIOLOGY & ADDITIONAL STUDIES:

## 2018-07-25 NOTE — CONSULT NOTE ADULT - SUBJECTIVE AND OBJECTIVE BOX
Chief complaint:  Syncope    HPI:  68 y/f with PMH of Atrial fibrillation on Eliquis, CAD s/p stent, hypothyroidism, DM, HTN was brought in by family after an episode of syncope at home. She was standing in the kitchen when she had this episode, for few seconds she blacked out but then after that she could hear her family but couldn't respond. This whole episode lasted less than a minute. She was about to fall but her family supported her, so didn't had any injury. Denies having any dizziness, light headedness prior to fall. No abnormal body movments, uprolling of eyes, loss of bladder and bowel movement during the episode, complains of having headache after the episode and felt shakiness, but no focal weakness. No similar episode in the past.   Was having on and off nasal bleed, for last 1 month, last episode being 3 days back mild in amount, denies having any dizziness or palpitation, she has been off Eliquis for 1 week    ROS:  Constitutional: No fever, chill, sweats  Eye: No recent visual problem  ENMT: No ear pain, nasal congestion, throat pain  Respiratoty: No SOB, cough  Cardiovascular: No chest pain, palpitaion, + syncope  Gastrointestinal: No nausea, vomitting, diarhea  Genitourinary: No dysuria, hematuria  Heam/Lymp: No brusing tendency, no swollen glands  Endocrine: Negative for excessive hunger, thirst  Musculoskeletal: No neck pain, back pain, joint pain  Intergumentory: No rash, skin lesions  Neurologic: alert and oriented    PAST MEDICAL & SURGICAL HISTORY  Atrial fibrillation  Arthritis  GERD (gastroesophageal reflux disease)  High cholesterol  Hypertension  Hypothyroid  Coronary heart disease  Diabetes  H/O right coronary artery stent placement      FAMILY HISTORY:  FAMILY HISTORY:  No pertinent family history in first degree relatives      SOCIAL HISTORY:  Denies smoking, alcohol    ALLERGIES:  IV Contrast (Hives)    MEDICATIONS:  MEDICATIONS  (STANDING):  aspirin enteric coated 81 milliGRAM(s) Oral daily  atorvastatin 80 milliGRAM(s) Oral at bedtime  heparin  Injectable 5000 Unit(s) SubCutaneous every 8 hours  levothyroxine 88 MICROGram(s) Oral daily  losartan 50 milliGRAM(s) Oral daily  pantoprazole    Tablet 40 milliGRAM(s) Oral before breakfast  sotalol 80 milliGRAM(s) Oral two times a day    MEDICATIONS  (PRN):  acetaminophen   Tablet. 650 milliGRAM(s) Oral every 6 hours PRN Moderate Pain (4 - 6)      HOME MEDICATIONS:  Home Medications:  Aspir 81 oral delayed release tablet: 1 tab(s) orally once a day (24 Jul 2018 03:42)  atorvastatin 80 mg oral tablet: 1 tab(s) orally once a day (24 Jul 2018 03:42)  Eliquis 5 mg oral tablet: 1 tab(s) orally 2 times a day (24 Jul 2018 03:42)  levothyroxine 88 mcg (0.088 mg) oral tablet: 1 tab(s) orally once a day (24 Jul 2018 03:42)  losartan 50 mg oral tablet: 1 tab(s) orally once a day (24 Jul 2018 03:42)  metFORMIN 1000 mg oral tablet: 1 tab(s) orally 2 times a day (24 Jul 2018 03:42)  pantoprazole 40 mg oral delayed release tablet: 1 tab(s) orally once a day (24 Jul 2018 03:42)  sotalol 80 mg oral tablet: 1 tab(s) orally 2 times a day (24 Jul 2018 03:42)  Victoza: subcutaneous once a day (24 Jul 2018 03:42)      VITALS:   T(F): 97.1 (07-25 @ 05:13), Max: 98 (07-24 @ 17:26)  HR: 62 (07-25 @ 05:13) (62 - 82)  BP: 138/65 (07-24 @ 17:26) (138/65 - 202/93)  BP(mean): --  RR: 18 (07-24 @ 19:56) (17 - 18)  SpO2: 98% (07-24 @ 22:09) (98% - 100%)    I&O's Summary      PHYSICAL EXAM:  GEN: Alert and oriented X 3, Well nourished, No acute distress  NECK: Supple, non tender, NO JVD, No carotid bruit,   LUNGS: Clear to auscultation bilaterally, non labored respiration  CARDIOVASCULAR: S1/S2 present, RRR , no murmus or rubs, + PP bilaterally  ABD: Soft, non-tender, non-distended,   EXT: No Lower extremity edema, no tenderness  NEURO: Non focal  SKIN: Intact    LABS:                        11.3   3.34  )-----------( 229      ( 25 Jul 2018 06:46 )             33.4     07-25    143  |  105  |  18  ----------------------------<  130<H>  3.9   |  27  |  0.7    Ca    9.1      25 Jul 2018 06:46    TPro  7.6  /  Alb  4.3  /  TBili  2.1<H>  /  DBili  x   /  AST  20  /  ALT  13  /  AlkPhos  64  07-23    PT/INR - ( 23 Jul 2018 21:59 )   PT: 10.90 sec;   INR: 1.01 ratio         PTT - ( 23 Jul 2018 21:59 )  PTT:31.9 sec  Troponin T, Serum: <0.01 ng/mL (07-25-18 @ 06:46)    CARDIAC MARKERS ( 25 Jul 2018 06:46 )  x     / <0.01 ng/mL / x     / x     / 1.6 ng/mL  CARDIAC MARKERS ( 23 Jul 2018 21:59 )  x     / <0.01 ng/mL / 121 U/L / x     / 2.6 ng/mL      RADIOLOGY:  -CXR:     < from: Xray Chest 1 View-PORTABLE IMMEDIATE (07.23.18 @ 22:43) >  Impression:      Lowlung volumes without lobar consolidation, pleural effusion or   pneumothorax.    < end of copied text >    ECG: NSR@80bpm    TELEMETRY EVENTS: Chief complaint:  Syncope    HPI:  68 y/f with PMH of Atrial fibrillation on Eliquis, CAD s/p stent, hypothyroidism, DM, HTN was brought in by family after an episode of syncope at home. She was standing in the kitchen when she had this episode, for few seconds she blacked out but then after that she could hear her family but couldn't respond. This whole episode lasted less than a minute. She was about to fall but her family supported her, so didn't had any injury. Denies having any dizziness, light headedness prior to fall. No abnormal body movments, uprolling of eyes, loss of bladder and bowel movement during the episode, complains of having headache after the episode and felt shakiness, but no focal weakness. No similar episode in the past.   Was having on and off nasal bleed, for last 1 month, last episode being 3 days back mild in amount, denies having any dizziness or palpitation, she has been off Eliquis for 1 week    ROS:  Constitutional: No fever, chill, sweats  Eye: No recent visual problem  ENMT: No ear pain, nasal congestion, throat pain  Respiratoty: No SOB, cough  Cardiovascular: No chest pain, palpitaion, + syncope  Gastrointestinal: No nausea, vomitting, diarhea  Genitourinary: No dysuria, hematuria  Heam/Lymp: No brusing tendency, no swollen glands  Endocrine: Negative for excessive hunger, thirst  Musculoskeletal: No neck pain, back pain, joint pain  Intergumentory: No rash, skin lesions  Neurologic: alert and oriented    PAST MEDICAL & SURGICAL HISTORY  Atrial fibrillation  Arthritis  GERD (gastroesophageal reflux disease)  High cholesterol  Hypertension  Hypothyroid  Coronary heart disease  Diabetes  H/O right coronary artery stent placement      FAMILY HISTORY:  FAMILY HISTORY:  No pertinent family history in first degree relatives      SOCIAL HISTORY:  Denies smoking, alcohol    ALLERGIES:  IV Contrast (Hives)    MEDICATIONS:  MEDICATIONS  (STANDING):  aspirin enteric coated 81 milliGRAM(s) Oral daily  atorvastatin 80 milliGRAM(s) Oral at bedtime  heparin  Injectable 5000 Unit(s) SubCutaneous every 8 hours  levothyroxine 88 MICROGram(s) Oral daily  losartan 50 milliGRAM(s) Oral daily  pantoprazole    Tablet 40 milliGRAM(s) Oral before breakfast  sotalol 80 milliGRAM(s) Oral two times a day    MEDICATIONS  (PRN):  acetaminophen   Tablet. 650 milliGRAM(s) Oral every 6 hours PRN Moderate Pain (4 - 6)      HOME MEDICATIONS:  Home Medications:  Aspir 81 oral delayed release tablet: 1 tab(s) orally once a day (24 Jul 2018 03:42)  atorvastatin 80 mg oral tablet: 1 tab(s) orally once a day (24 Jul 2018 03:42)  Eliquis 5 mg oral tablet: 1 tab(s) orally 2 times a day (24 Jul 2018 03:42)  levothyroxine 88 mcg (0.088 mg) oral tablet: 1 tab(s) orally once a day (24 Jul 2018 03:42)  losartan 50 mg oral tablet: 1 tab(s) orally once a day (24 Jul 2018 03:42)  metFORMIN 1000 mg oral tablet: 1 tab(s) orally 2 times a day (24 Jul 2018 03:42)  pantoprazole 40 mg oral delayed release tablet: 1 tab(s) orally once a day (24 Jul 2018 03:42)  sotalol 80 mg oral tablet: 1 tab(s) orally 2 times a day (24 Jul 2018 03:42)  Victoza: subcutaneous once a day (24 Jul 2018 03:42)      VITALS:   T(F): 97.1 (07-25 @ 05:13), Max: 98 (07-24 @ 17:26)  HR: 62 (07-25 @ 05:13) (62 - 82)  BP: 138/65 (07-24 @ 17:26) (138/65 - 202/93)  BP(mean): --  RR: 18 (07-24 @ 19:56) (17 - 18)  SpO2: 98% (07-24 @ 22:09) (98% - 100%)    PHYSICAL EXAM:  GEN: Alert and oriented X 3, Well nourished, No acute distress  NECK: Supple, non tender, NO JVD, No carotid bruit,   LUNGS: Clear to auscultation bilaterally, non labored respiration  CARDIOVASCULAR: S1/S2 present, RRR , no murmus or rubs, + PP bilaterally  ABD: Soft, non-tender, non-distended,   EXT: No Lower extremity edema, no tenderness  NEURO: Non focal  SKIN: Intact    LABS:                        11.3   3.34  )-----------( 229      ( 25 Jul 2018 06:46 )             33.4     07-25    143  |  105  |  18  ----------------------------<  130<H>  3.9   |  27  |  0.7    Ca    9.1      25 Jul 2018 06:46    TPro  7.6  /  Alb  4.3  /  TBili  2.1<H>  /  DBili  x   /  AST  20  /  ALT  13  /  AlkPhos  64  07-23    PT/INR - ( 23 Jul 2018 21:59 )   PT: 10.90 sec;   INR: 1.01 ratio         PTT - ( 23 Jul 2018 21:59 )  PTT:31.9 sec  Troponin T, Serum: <0.01 ng/mL (07-25-18 @ 06:46)    CARDIAC MARKERS ( 25 Jul 2018 06:46 )  x     / <0.01 ng/mL / x     / x     / 1.6 ng/mL  CARDIAC MARKERS ( 23 Jul 2018 21:59 )  x     / <0.01 ng/mL / 121 U/L / x     / 2.6 ng/mL      RADIOLOGY:  -CXR:     < from: Xray Chest 1 View-PORTABLE IMMEDIATE (07.23.18 @ 22:43) >  Impression:      Lowlung volumes without lobar consolidation, pleural effusion or   pneumothorax.    < end of copied text >    ECG: NSR@80bpm, short MD    TELEMETRY EVENTS: Chief complaint:  Syncope    HPI:  68 y/f with PMH of Atrial fibrillation on Eliquis, CAD s/p stent, hypothyroidism, DM, HTN was brought in by family after an episode of syncope at home. She was standing in the kitchen when she had this episode, for few seconds she blacked out but then after that she could hear her family but couldn't respond. This whole episode lasted less than a minute. She was about to fall but her family supported her, so didn't had any injury. Denies having any dizziness, light headedness or palpitations prior to fall. but felt her head is very full and severe headach when she recoverd  lNo abnormal body movments, uprolling of eyes, loss of bladder and bowel movement during the episode,  no focal weakness post syncope . No similar episode in the past.   Was having on and off nasal bleed, for last 1 month, last episode being 3 days back mild in amount, denies having any dizziness or palpitation, she has been off Eliquis for 1 week because of nose bleed .pt also c/o  of episodes of palpitations described as  heart beating very  jumping out of her chest  Past history  of diabetes ,hypertension, hypothyroidism  ROS:  Constitutional: No fever, chill, sweats  Eye: No recent visual problem  ENMT: No ear pain, nasal congestion, throat pain  Respiratoty: No SOB, cough  Cardiovascular: No chest pain, palpitaion, + syncope  Gastrointestinal: No nausea, vomitting, diarhea  Genitourinary: No dysuria, hematuria  Heam/Lymp: No brusing tendency, no swollen glands  Endocrine: Negative for excessive hunger, thirst  Musculoskeletal: No neck pain, back pain, joint pain  Intergumentory: No rash, skin lesions  Neurologic: alert and oriented    PAST MEDICAL & SURGICAL HISTORY  Atrial fibrillation  Arthritis  GERD (gastroesophageal reflux disease)  High cholesterol  Hypertension  Hypothyroid  Coronary heart disease  Diabetes  H/O right coronary artery stent placement      FAMILY HISTORY:  FAMILY HISTORY:  No pertinent family history in first degree relatives      SOCIAL HISTORY:  Denies smoking, alcohol    ALLERGIES:  IV Contrast (Hives)    MEDICATIONS:  MEDICATIONS  (STANDING):  aspirin enteric coated 81 milliGRAM(s) Oral daily  atorvastatin 80 milliGRAM(s) Oral at bedtime  heparin  Injectable 5000 Unit(s) SubCutaneous every 8 hours  levothyroxine 88 MICROGram(s) Oral daily  losartan 50 milliGRAM(s) Oral daily  pantoprazole    Tablet 40 milliGRAM(s) Oral before breakfast  sotalol 80 milliGRAM(s) Oral two times a day    MEDICATIONS  (PRN):  acetaminophen   Tablet. 650 milliGRAM(s) Oral every 6 hours PRN Moderate Pain (4 - 6)      HOME MEDICATIONS:  Home Medications:  Aspir 81 oral delayed release tablet: 1 tab(s) orally once a day (24 Jul 2018 03:42)  atorvastatin 80 mg oral tablet: 1 tab(s) orally once a day (24 Jul 2018 03:42)  Eliquis 5 mg oral tablet: 1 tab(s) orally 2 times a day (24 Jul 2018 03:42)  levothyroxine 88 mcg (0.088 mg) oral tablet: 1 tab(s) orally once a day (24 Jul 2018 03:42)  losartan 50 mg oral tablet: 1 tab(s) orally once a day (24 Jul 2018 03:42)  metFORMIN 1000 mg oral tablet: 1 tab(s) orally 2 times a day (24 Jul 2018 03:42)  pantoprazole 40 mg oral delayed release tablet: 1 tab(s) orally once a day (24 Jul 2018 03:42)  sotalol 80 mg oral tablet: 1 tab(s) orally 2 times a day (24 Jul 2018 03:42)  Victoza: subcutaneous once a day (24 Jul 2018 03:42)      VITALS:   T(F): 97.1 (07-25 @ 05:13), Max: 98 (07-24 @ 17:26)  HR: 62 (07-25 @ 05:13) (62 - 82)  BP: 138/65 (07-24 @ 17:26) (138/65 - 202/93)  BP(mean): --  RR: 18 (07-24 @ 19:56) (17 - 18)  SpO2: 98% (07-24 @ 22:09) (98% - 100%)    PHYSICAL EXAM:  GEN: Alert and oriented X 3, Well nourished, No acute distress  NECK: Supple, non tender, NO JVD, No carotid bruit,   LUNGS: Clear to auscultation bilaterally, non labored respiration  CARDIOVASCULAR: S1/S2 present, RRR , no murmus or rubs, + PP bilaterally  ABD: Soft, non-tender, non-distended,   EXT: No Lower extremity edema, no tenderness  NEURO: Non focal  SKIN: Intact    LABS:                        11.3   3.34  )-----------( 229      ( 25 Jul 2018 06:46 )             33.4     07-25    143  |  105  |  18  ----------------------------<  130<H>  3.9   |  27  |  0.7    Ca    9.1      25 Jul 2018 06:46    TPro  7.6  /  Alb  4.3  /  TBili  2.1<H>  /  DBili  x   /  AST  20  /  ALT  13  /  AlkPhos  64  07-23    PT/INR - ( 23 Jul 2018 21:59 )   PT: 10.90 sec;   INR: 1.01 ratio         PTT - ( 23 Jul 2018 21:59 )  PTT:31.9 sec  Troponin T, Serum: <0.01 ng/mL (07-25-18 @ 06:46)    CARDIAC MARKERS ( 25 Jul 2018 06:46 )  x     / <0.01 ng/mL / x     / x     / 1.6 ng/mL  CARDIAC MARKERS ( 23 Jul 2018 21:59 )  x     / <0.01 ng/mL / 121 U/L / x     / 2.6 ng/mL      RADIOLOGY:  -CXR:     < from: Xray Chest 1 View-PORTABLE IMMEDIATE (07.23.18 @ 22:43) >  Impression:      Lowlung volumes without lobar consolidation, pleural effusion or   pneumothorax.    < end of copied text >    ECG: NSR@80bpm, short DE    TELEMETRY EVENTS:

## 2018-07-25 NOTE — CONSULT NOTE ADULT - ASSESSMENT
ISOLATED SYNCOPE   TELEMETRY SHOWED   PACS , PAUSES FOLLOWED BY  JUNCTIONAL  ESCAPE RHYTHM  AND PROLONGED PERIODS OH JUNCTIONAL WITH AV DISSOCIATION

## 2018-07-25 NOTE — PROGRESS NOTE ADULT - SUBJECTIVE AND OBJECTIVE BOX
DERRICK DORMAN 67yo WF W Female brought by family to the ER after a witness episode of syncope.  The pt was standing in the kitchen when she felt faint and was caught by family member, thus there was no traume.  The  pt had LOC for less than 1 min, came to and was able to hear voices but could not speak.  The pt denies CP, palp, SOB or loss of sphincter tone.  The family states there were no clonic tonic limb movements.  The pt had episodes of epistaxis and was told to hold the eliquis x 1 week by cardiologist.  The PMHx includes:  HTN, ASHD, CAD, sp stent, afib, AC with Eliquis, Hyperlipidemia, DM II, Hypothyroidism, OA, DDD, DJD.    INTERVAL HPI/OVERNIGHT EVENTS:pt evaluated by EPS, tele shows PACs ff by pauses and junctional escape rhythm with AV disscociaation, recommend loop recorder, Eliquis on hold    MEDICATIONS  (STANDING):  aspirin enteric coated 81 milliGRAM(s) Oral daily  atorvastatin 80 milliGRAM(s) Oral at bedtime  heparin  Injectable 5000 Unit(s) SubCutaneous every 8 hours  levothyroxine 88 MICROGram(s) Oral daily  losartan 50 milliGRAM(s) Oral daily  pantoprazole    Tablet 40 milliGRAM(s) Oral before breakfast  sotalol 80 milliGRAM(s) Oral two times a day    MEDICATIONS  (PRN):  acetaminophen   Tablet. 650 milliGRAM(s) Oral every 6 hours PRN Moderate Pain (4 - 6)      Allergies    IV Contrast (Hives)    NB:  pt is a Jehova's Witness/no blood or blood products	    Vital Signs Last 24 Hrs    T(F): 96.9   HR: 76  BP: 135/66  RR: 18  SpO2: 99%     PHYSICAL EXAM:      Constitutional: pt is alert, oriented and in NAD    Eyes:  normal    ENMT:  dry oral mucosa    Neck:  supple, no JVD or bruits    Respiratory:  shallow respirations, no crackles, rales or wheezing    Cardiovascular:  S1S2 reg    Gastrointestinal:  globose soft and benign    Extremities:  moves all ext, + arthritic changes    Neurological:  no focal deficits    Skin:  normal turgor    Lymph Nodes:  not enlarged        LABS:                        11  3.3  )-----------( 229       on admission H/H 12/35, PLTS 259             33         143  |  105  |  18  ----------------------------<  130  3.9   |  27  |  0.7    Ca    8.9      24 Jul 2018 11:28  CE negative  TPro  7.6  /  Alb  4.3  /  TBili  2.1<H>  /  DBili  x   /  AST  20  /  ALT  13  /  AlkPhos  64  07-23    PT/INR - ( 23 Jul 2018 21:59 )   PT: 10.90 sec;   INR: 1.01 ratio         PTT - ( 23 Jul 2018 21:59 )  PTT:31.9 sec      RADIOLOGY & ADDITIONAL TESTS:    CXR:  poor inspiratory effort, low lung volumes, no consolidation    CT of the Head:  white matter changes, no acute pathology    EKG:  NSR 76/min, PRWP  tele: + PACs, pauses with escape junctional rhythm , AV dissociation

## 2018-07-25 NOTE — CONSULT NOTE ADULT - CONSULT REASON
SYNCOPE  68 YRS OLD C/O OF SYNCOPAL EPISODE FOR THE FIRST TIME DESCRIBED AS WHIL STANDING IN THE KITCHEN SPEAKING  TO HER GRAND DAUGHTER  FELT VACUUM  FEELING GOING UP TO HER HEAD NOT PREDED  BY PALPITATIONS OR CHEST PAIN OR DIZZINESS  LOST CONSCIOUSNESS  FOR FEW SECONDS THEN FELT SEVERE FULLNESS IN HER HEAD  LIKE A HEADACHE .NO PREVIOUS HISTORY  OF SIMILAR EPISODES  E NO ASSOCIATED  SPHINCTERIC DISTURBANCE OR WEAKNESS  PT HAS HISTORY OF P.A.FIB, HYPERTENSION ,DIABETES  PT HAS BEEN ON ELIQUIS WHISH WAS STOPPED 1 [place] AGO  FOR NOSE BLEED PT  TAKES   ,SOTALOL 80 MG BID ,LOSARTAN  80 MG BID

## 2018-07-25 NOTE — PROGRESS NOTE ADULT - ASSESSMENT
Episode of Syncope, R/O cardiac vs neuro etiology vs vasovagal  Episodes of epistaxis, Eliquis on hold  Hx of HTN, ASHD, CAD sp stent, parox afib, + AC with Eliquis  Hx of Hyperlipidemia  Hx of Hypothyroidism  Hx of DM II  Hx of POA, DDD, DJD    place pt on telemetry, which shows PACs, pauses, escape junctional rhythm, AV disscociation  EPS recommends loop recorder  cardio consult,   check orthostatics, stool guiac, TSH  CT of head shows white mater changes and no acute intracranial pathology  Neuro consult ? further w/up  monitor H/H and Plts  Eliquis on hold  cont all meds  NB:  pt is a Jehova's Witness/ no blood or blood products

## 2018-07-25 NOTE — CONSULT NOTE ADULT - ASSESSMENT
68 y/f with PMH of Atrial fibrillation on Eliquis, CAD s/p stent, hypothyroidism, DM, HTN    Syncope  Parx Afib on AC, [id number] 3  CAD  DM  HTN 68 y/f with PMH of Atrial fibrillation on Eliquis, CAD s/p stent, hypothyroidism, DM, HTN    Syncope   Parx Afib on AC, [id number] 3  CAD  DM  HTN

## 2018-07-26 ENCOUNTER — TRANSCRIPTION ENCOUNTER (OUTPATIENT)
Age: 68
End: 2018-07-26

## 2018-07-26 VITALS — TEMPERATURE: 97 F

## 2018-07-26 LAB
ANION GAP SERPL CALC-SCNC: 12 MMOL/L — SIGNIFICANT CHANGE UP (ref 7–14)
BASOPHILS # BLD AUTO: 0.04 K/UL — SIGNIFICANT CHANGE UP (ref 0–0.2)
BASOPHILS NFR BLD AUTO: 1.1 % — HIGH (ref 0–1)
BUN SERPL-MCNC: 25 MG/DL — HIGH (ref 10–20)
CALCIUM SERPL-MCNC: 9.2 MG/DL — SIGNIFICANT CHANGE UP (ref 8.5–10.1)
CHLORIDE SERPL-SCNC: 104 MMOL/L — SIGNIFICANT CHANGE UP (ref 98–110)
CO2 SERPL-SCNC: 28 MMOL/L — SIGNIFICANT CHANGE UP (ref 17–32)
CREAT SERPL-MCNC: 0.7 MG/DL — SIGNIFICANT CHANGE UP (ref 0.7–1.5)
EOSINOPHIL # BLD AUTO: 0.17 K/UL — SIGNIFICANT CHANGE UP (ref 0–0.7)
EOSINOPHIL NFR BLD AUTO: 4.6 % — SIGNIFICANT CHANGE UP (ref 0–8)
GLUCOSE SERPL-MCNC: 135 MG/DL — HIGH (ref 70–99)
HCT VFR BLD CALC: 33.5 % — LOW (ref 37–47)
HGB BLD-MCNC: 11.2 G/DL — LOW (ref 12–16)
IMM GRANULOCYTES NFR BLD AUTO: 0.3 % — SIGNIFICANT CHANGE UP (ref 0.1–0.3)
LYMPHOCYTES # BLD AUTO: 2.11 K/UL — SIGNIFICANT CHANGE UP (ref 1.2–3.4)
LYMPHOCYTES # BLD AUTO: 57 % — HIGH (ref 20.5–51.1)
MAGNESIUM SERPL-MCNC: 2 MG/DL — SIGNIFICANT CHANGE UP (ref 1.8–2.4)
MCHC RBC-ENTMCNC: 29.9 PG — SIGNIFICANT CHANGE UP (ref 27–31)
MCHC RBC-ENTMCNC: 33.4 G/DL — SIGNIFICANT CHANGE UP (ref 32–37)
MCV RBC AUTO: 89.3 FL — SIGNIFICANT CHANGE UP (ref 81–99)
MONOCYTES # BLD AUTO: 0.45 K/UL — SIGNIFICANT CHANGE UP (ref 0.1–0.6)
MONOCYTES NFR BLD AUTO: 12.2 % — HIGH (ref 1.7–9.3)
NEUTROPHILS # BLD AUTO: 0.92 K/UL — LOW (ref 1.4–6.5)
NEUTROPHILS NFR BLD AUTO: 24.8 % — LOW (ref 42.2–75.2)
NRBC # BLD: 0 /100 WBCS — SIGNIFICANT CHANGE UP (ref 0–0)
PLATELET # BLD AUTO: 231 K/UL — SIGNIFICANT CHANGE UP (ref 130–400)
POTASSIUM SERPL-MCNC: 4.4 MMOL/L — SIGNIFICANT CHANGE UP (ref 3.5–5)
POTASSIUM SERPL-SCNC: 4.4 MMOL/L — SIGNIFICANT CHANGE UP (ref 3.5–5)
RBC # BLD: 3.75 M/UL — LOW (ref 4.2–5.4)
RBC # FLD: 13.6 % — SIGNIFICANT CHANGE UP (ref 11.5–14.5)
SODIUM SERPL-SCNC: 144 MMOL/L — SIGNIFICANT CHANGE UP (ref 135–146)
TSH SERPL-MCNC: 2.5 UIU/ML — SIGNIFICANT CHANGE UP (ref 0.27–4.2)
WBC # BLD: 3.7 K/UL — LOW (ref 4.8–10.8)
WBC # FLD AUTO: 3.7 K/UL — LOW (ref 4.8–10.8)

## 2018-07-26 RX ORDER — APIXABAN 2.5 MG/1
1 TABLET, FILM COATED ORAL
Qty: 0 | Refills: 0 | DISCHARGE
Start: 2018-07-26

## 2018-07-26 RX ORDER — LOSARTAN POTASSIUM 100 MG/1
1 TABLET, FILM COATED ORAL
Qty: 30 | Refills: 0
Start: 2018-07-26 | End: 2018-08-24

## 2018-07-26 RX ORDER — LOSARTAN POTASSIUM 100 MG/1
1 TABLET, FILM COATED ORAL
Qty: 0 | Refills: 0 | COMMUNITY

## 2018-07-26 RX ORDER — SOTALOL HCL 120 MG
80 TABLET ORAL DAILY
Qty: 0 | Refills: 0 | Status: DISCONTINUED | OUTPATIENT
Start: 2018-07-26 | End: 2018-07-26

## 2018-07-26 RX ORDER — LOSARTAN POTASSIUM 100 MG/1
1 TABLET, FILM COATED ORAL
Qty: 0 | Refills: 0 | COMMUNITY
Start: 2018-07-26

## 2018-07-26 RX ORDER — APIXABAN 2.5 MG/1
1 TABLET, FILM COATED ORAL
Qty: 0 | Refills: 0 | COMMUNITY

## 2018-07-26 RX ORDER — APIXABAN 2.5 MG/1
5 TABLET, FILM COATED ORAL EVERY 12 HOURS
Qty: 0 | Refills: 0 | Status: DISCONTINUED | OUTPATIENT
Start: 2018-07-26 | End: 2018-07-26

## 2018-07-26 RX ADMIN — Medication 81 MILLIGRAM(S): at 12:39

## 2018-07-26 RX ADMIN — HEPARIN SODIUM 5000 UNIT(S): 5000 INJECTION INTRAVENOUS; SUBCUTANEOUS at 15:45

## 2018-07-26 RX ADMIN — HEPARIN SODIUM 5000 UNIT(S): 5000 INJECTION INTRAVENOUS; SUBCUTANEOUS at 05:15

## 2018-07-26 RX ADMIN — Medication 88 MICROGRAM(S): at 05:15

## 2018-07-26 RX ADMIN — PANTOPRAZOLE SODIUM 40 MILLIGRAM(S): 20 TABLET, DELAYED RELEASE ORAL at 05:15

## 2018-07-26 RX ADMIN — Medication 80 MILLIGRAM(S): at 12:39

## 2018-07-26 NOTE — PROGRESS NOTE ADULT - ASSESSMENT
Syncope  HTN  P Afib    recommend event monitor  c/w sotalol for afib  restart Eliquis [id number]  Tele reviewed PACS and junctional   d/w Dr. herman Syncope  HTN  P Afib    recommend event monitor  c/w sotalol for afib  restart Eliquis [id number]  Tele reviewed PACS and junctional   can discharge pt today  d/w Dr. herman

## 2018-07-26 NOTE — DISCHARGE NOTE ADULT - MEDICATION SUMMARY - MEDICATIONS TO TAKE
I will START or STAY ON the medications listed below when I get home from the hospital:    Aspir 81 oral delayed release tablet  -- 1 tab(s) by mouth once a day  -- Indication: For CAD    Cozaar 100 mg oral tablet  -- 1 tab(s) by mouth once a day (at bedtime)  -- Indication: For HTN    sotalol 80 mg oral tablet  -- 1 tab(s) by mouth 2 times a day  -- Indication: For Afib    apixaban 5 mg oral tablet  -- 1 tab(s) by mouth every 12 hours  -- Indication: For Afib    Victoza  -- subcutaneous once a day  -- Indication: For DM    metFORMIN 1000 mg oral tablet  -- 1 tab(s) by mouth 2 times a day  -- Indication: For DM    atorvastatin 80 mg oral tablet  -- 1 tab(s) by mouth once a day  -- Indication: For DLD    pantoprazole 40 mg oral delayed release tablet  -- 1 tab(s) by mouth once a day  -- Indication: For GERD (gastroesophageal reflux disease)    levothyroxine 88 mcg (0.088 mg) oral tablet  -- 1 tab(s) by mouth once a day  -- Indication: For Hypothyroid

## 2018-07-26 NOTE — DISCHARGE NOTE ADULT - PLAN OF CARE
manage and prevent recurrence -take all medications as directed  -follow up with EP   -use event monitor -take all medications as directed manage and prevent complications -take all medications as directed  -follow up with EP for event monitor  -use event monitor

## 2018-07-26 NOTE — DISCHARGE NOTE ADULT - HOSPITAL COURSE
68 y/f with PMH of Atrial fibrillation on Eliquis, CAD s/p stent, hypothyroidism, DM, HTN was brought in by family after an episode of syncope at home. She was standing in the kitchen when she had this episode, for few seconds she blacked out but then after that she could hear her family but couldn't respond. This whole episode lasted less than a minute. She was about to fall but her family supported her, so didn't had any injury. Denies having any dizziness, light headedness prior to fall. No abnormal body movements, uprolling of eyes, loss of bladder and bowel movement during the episode, complains of having headache after the episode and felt shakiness, but no focal weakness. No similar episodes in the past. Was having on and off nasal bleed, for last 1 month, last episode being 3 days back mild in amount, denies having any dizziness or palpitation, Her cardiologist recommended to hold Eliquis for 1 week, was supposed to resume on 7/24. Denied complaints of chest pain, shortness of breath, belly pain. Normal urine, mentions she is mostly constipated, her last BM was one day back. She had a work up by EP and was found to have junctional rhythm with possible pauses. Will resume eliquis and discharge on an event monitor as per EP.

## 2018-07-26 NOTE — DISCHARGE NOTE ADULT - PATIENT PORTAL LINK FT
You can access the [facility] Patient Portal, offered by [facility], by registering with the following website: [email or web address]

## 2018-07-26 NOTE — DISCHARGE NOTE ADULT - CARE PLAN
Principal Discharge DX:	Syncope and collapse  Goal:	manage and prevent recurrence  Assessment and plan of treatment:	-take all medications as directed  -follow up with EP   -use event monitor  Secondary Diagnosis:	Atrial fibrillation  Goal:	manage and prevent recurrence  Assessment and plan of treatment:	-take all medications as directed  Secondary Diagnosis:	Diabetes  Goal:	manage and prevent complications  Assessment and plan of treatment:	-take all medications as directed Principal Discharge DX:	Syncope and collapse  Goal:	manage and prevent recurrence  Assessment and plan of treatment:	-take all medications as directed  -follow up with EP for event monitor  -use event monitor  Secondary Diagnosis:	Atrial fibrillation  Goal:	manage and prevent recurrence  Assessment and plan of treatment:	-take all medications as directed  Secondary Diagnosis:	Diabetes  Goal:	manage and prevent complications  Assessment and plan of treatment:	-take all medications as directed

## 2018-07-26 NOTE — PROGRESS NOTE ADULT - SUBJECTIVE AND OBJECTIVE BOX
DERRICK DORMAN  68y  Female    Allergy: IV Contrast (Hives)      Patient is a 68y old  Female who presents with a chief complaint of syncope (24 Jul 2018 02:41)    INTERVAL HPI/OVERNIGHT EVENTS:  seen and examined pt at bedside. no overnight events  Denies chest pain, SOB, palpitation    REVIEW OF SYSTEMS:  All other review of systems negative    PAST MEDICAL & SURGICAL HISTORY:  Atrial fibrillation  Arthritis  GERD (gastroesophageal reflux disease)  High cholesterol  Hypertension  Hypothyroid  Coronary heart disease  Diabetes  H/O right coronary artery stent placement    Vital Signs Last 24 Hrs  T(C): 36.1 (26 Jul 2018 05:12), Max: 36.4 (25 Jul 2018 14:51)  T(F): 97 (26 Jul 2018 05:12), Max: 97.6 (25 Jul 2018 14:51)  HR: --  BP: --  BP(mean): --  RR: 16 (26 Jul 2018 05:12) (16 - 16)  SpO2: --    I&O's Summary    25 Jul 2018 07:01  -  26 Jul 2018 07:00  --------------------------------------------------------  IN: 0 mL / OUT: 250 mL / NET: -250 mL    Home Medications:  Aspir 81 oral delayed release tablet: 1 tab(s) orally once a day (24 Jul 2018 03:42)  atorvastatin 80 mg oral tablet: 1 tab(s) orally once a day (24 Jul 2018 03:42)  Eliquis 5 mg oral tablet: 1 tab(s) orally 2 times a day (24 Jul 2018 03:42)  levothyroxine 88 mcg (0.088 mg) oral tablet: 1 tab(s) orally once a day (24 Jul 2018 03:42)  losartan 50 mg oral tablet: 1 tab(s) orally once a day (24 Jul 2018 03:42)  metFORMIN 1000 mg oral tablet: 1 tab(s) orally 2 times a day (24 Jul 2018 03:42)  pantoprazole 40 mg oral delayed release tablet: 1 tab(s) orally once a day (24 Jul 2018 03:42)  sotalol 80 mg oral tablet: 1 tab(s) orally 2 times a day (24 Jul 2018 03:42)  Victoza: subcutaneous once a day (24 Jul 2018 03:42)    PHYSICAL EXAM:  GENERAL: NAD  HEENT: no pallor/icterus  NECK: supple  PSYCH: no agitation, baseline mentation  NERVOUS SYSTEM:  Alert & Oriented X3, no focal deficits  PULMONARY: CTA b/l ; No rales, rhonchi, wheezing, or rubs  CARDIOVASCULAR: Regular rate and rhythm; No murmurs, rubs, or gallops  GI: Soft, Nontender, Nondistended; Bowel sounds present  EXTREMITIES:  2+ Peripheral Pulses, No clubbing, cyanosis, or edema  SKIN: No rashes    Consultant(s) Notes Reviewed:  [x ] YES  [ ] NO    LABS                        11.2   3.70  )-----------( 231      ( 26 Jul 2018 07:35 )             33.5     07-26    144  |  104  |  25<H>  ----------------------------<  135<H>  4.4   |  28  |  0.7    Ca    9.2      26 Jul 2018 07:35  Mg     2.0     07-26      CARDIAC MARKERS ( 25 Jul 2018 06:46 )  x     / <0.01 ng/mL / x     / x     / 1.6 ng/mL            RADIOLOGY & ADDITIONAL TESTS:    Imaging Personally Reviewed:  [ ] YES  [ ] NO    MEDICATIONS  (STANDING):  aspirin enteric coated 81 milliGRAM(s) Oral daily  atorvastatin 80 milliGRAM(s) Oral at bedtime  heparin  Injectable 5000 Unit(s) SubCutaneous every 8 hours  levothyroxine 88 MICROGram(s) Oral daily  losartan 100 milliGRAM(s) Oral at bedtime  pantoprazole    Tablet 40 milliGRAM(s) Oral before breakfast    MEDICATIONS  (PRN):  acetaminophen   Tablet. 650 milliGRAM(s) Oral every 6 hours PRN Moderate Pain (4 - 6)

## 2018-07-26 NOTE — DISCHARGE NOTE ADULT - CARE PROVIDER_API CALL
Miguelina Hawkins), Medicine  [address] [facility] [address]  [place] [address]  Phone: [phone]  Fax: [phone]    Navdeep Montes), Cardiology; Internal Medicine  [address] [place] [address]  Phone: [phone]  Fax: [phone]

## 2018-07-31 DIAGNOSIS — I25.10 ATHEROSCLEROTIC HEART DISEASE OF NATIVE CORONARY ARTERY WITHOUT ANGINA PECTORIS: ICD-10-CM

## 2018-07-31 DIAGNOSIS — Z95.5 PRESENCE OF CORONARY ANGIOPLASTY IMPLANT AND GRAFT: ICD-10-CM

## 2018-07-31 DIAGNOSIS — R55 SYNCOPE AND COLLAPSE: ICD-10-CM

## 2018-07-31 DIAGNOSIS — Z91.041 RADIOGRAPHIC DYE ALLERGY STATUS: ICD-10-CM

## 2018-07-31 DIAGNOSIS — I48.91 UNSPECIFIED ATRIAL FIBRILLATION: ICD-10-CM

## 2018-07-31 DIAGNOSIS — E03.9 HYPOTHYROIDISM, UNSPECIFIED: ICD-10-CM

## 2018-07-31 DIAGNOSIS — E11.9 TYPE 2 DIABETES MELLITUS WITHOUT COMPLICATIONS: ICD-10-CM

## 2018-07-31 DIAGNOSIS — M19.90 UNSPECIFIED OSTEOARTHRITIS, UNSPECIFIED SITE: ICD-10-CM

## 2018-07-31 DIAGNOSIS — I10 ESSENTIAL (PRIMARY) HYPERTENSION: ICD-10-CM

## 2018-09-27 ENCOUNTER — APPOINTMENT (OUTPATIENT)
Dept: CARDIOLOGY | Facility: CLINIC | Age: 68
End: 2018-09-27

## 2018-09-27 VITALS — DIASTOLIC BLOOD PRESSURE: 100 MMHG | WEIGHT: 192 LBS | SYSTOLIC BLOOD PRESSURE: 160 MMHG | BODY MASS INDEX: 36.28 KG/M2

## 2018-09-27 VITALS — SYSTOLIC BLOOD PRESSURE: 110 MMHG | DIASTOLIC BLOOD PRESSURE: 80 MMHG

## 2018-09-27 PROBLEM — E11.9 TYPE 2 DIABETES MELLITUS WITHOUT COMPLICATIONS: Chronic | Status: ACTIVE | Noted: 2018-07-23

## 2018-09-27 PROBLEM — E78.00 PURE HYPERCHOLESTEROLEMIA, UNSPECIFIED: Chronic | Status: ACTIVE | Noted: 2018-07-23

## 2018-09-27 PROBLEM — I10 ESSENTIAL (PRIMARY) HYPERTENSION: Chronic | Status: ACTIVE | Noted: 2018-07-23

## 2018-09-27 PROBLEM — I25.10 ATHEROSCLEROTIC HEART DISEASE OF NATIVE CORONARY ARTERY WITHOUT ANGINA PECTORIS: Chronic | Status: ACTIVE | Noted: 2018-07-23

## 2018-09-27 PROBLEM — K21.9 GASTRO-ESOPHAGEAL REFLUX DISEASE WITHOUT ESOPHAGITIS: Chronic | Status: ACTIVE | Noted: 2018-07-23

## 2018-09-27 RX ORDER — METOPROLOL SUCCINATE 100 MG/1
100 TABLET, EXTENDED RELEASE ORAL
Qty: 90 | Refills: 0 | Status: COMPLETED | COMMUNITY
Start: 2017-02-05 | End: 2018-09-27

## 2019-03-21 ENCOUNTER — APPOINTMENT (OUTPATIENT)
Dept: CARDIOLOGY | Facility: CLINIC | Age: 69
End: 2019-03-21

## 2019-03-21 VITALS — DIASTOLIC BLOOD PRESSURE: 70 MMHG | SYSTOLIC BLOOD PRESSURE: 120 MMHG

## 2019-03-21 RX ORDER — DOXYCYCLINE HYCLATE 100 MG/1
100 CAPSULE ORAL
Qty: 14 | Refills: 0 | Status: DISCONTINUED | COMMUNITY
Start: 2019-02-24

## 2019-03-21 RX ORDER — LEVOTHYROXINE SODIUM 0.1 MG/1
100 TABLET ORAL DAILY
Refills: 0 | Status: DISCONTINUED | COMMUNITY
End: 2019-03-21

## 2019-03-21 RX ORDER — ASPIRIN 81 MG/1
81 TABLET, COATED ORAL
Refills: 0 | Status: DISCONTINUED | COMMUNITY
End: 2019-03-21

## 2019-03-21 NOTE — PHYSICAL EXAM
[General Appearance - Well Developed] : well developed [General Appearance - Well Nourished] : well nourished [Normal Oral Mucosa] : normal oral mucosa [Normal Oropharynx] : normal oropharynx [Respiration, Rhythm And Depth] : normal respiratory rhythm and effort [Auscultation Breath Sounds / Voice Sounds] : lungs were clear to auscultation bilaterally [Heart Rate And Rhythm] : heart rate and rhythm were normal [Heart Sounds] : normal S1 and S2 [Arterial Pulses Normal] : the arterial pulses were normal [Bowel Sounds] : normal bowel sounds [Abdomen Soft] : soft [Abnormal Walk] : normal gait [Nail Clubbing] : no clubbing of the fingernails [Cyanosis, Localized] : no localized cyanosis [Skin Color & Pigmentation] : normal skin color and pigmentation [Skin Turgor] : normal skin turgor [] : no rash [Oriented To Time, Place, And Person] : oriented to person, place, and time [Impaired Insight] : insight and judgment were intact [No Anxiety] : not feeling anxious

## 2019-03-21 NOTE — ASSESSMENT
[FreeTextEntry1] : 69 years old female with pmh of HTN, CAD, S/P PTCA with stent 2002, DM, Symptomatic   PAF dx in 10/2018 \par on Sotalol 80 mg BID for rhythm control \par \par Reports feeling well, tolerating meds well \par Reports c/w meds , on ELiquis for CVA prevention, no s/s bleeding  . Routine labs f/u by PCP\par \par Discussed  further Afib management , if recurrent  Afib , she will consider Afib ablation \par \par \par Plan \par 1- Continue  Eliquis 5 mg BID ( CHADS- VASc - 4, CAD, DM, FEMALE, HTN ) \par 2. Continue Sotalol 80 mg BID , normal QTc (432ms)\par 3. F/U in 6 months \par 4. Routine labs with PMD \par 5. F/U cardiologist as scheduled

## 2019-03-21 NOTE — HISTORY OF PRESENT ILLNESS
[FreeTextEntry1] : 69 years old female with PAF dx in 10/2018 , on Sotalol 80 mg BID and ELiquis 10/31/2018 she returns for a follow up . \par \par She denies CP/SOB.  Reports rare palpitations , brief in duration lasting seconds .  \par No dizziness, or syncope .Good activity tolerance \par Denies PEREZ \par \par ECG ( 3/21/2019) - NSR at 81 bpm, QTc 432 ms , non specific TWI \par ECG ( 5/14/2018)- NSR at 66 bpm, normal OK, QRS, QTc 444 ms

## 2019-05-21 ENCOUNTER — APPOINTMENT (OUTPATIENT)
Dept: CARDIOLOGY | Facility: CLINIC | Age: 69
End: 2019-05-21
Payer: MEDICARE

## 2019-05-21 PROCEDURE — 99214 OFFICE O/P EST MOD 30 MIN: CPT | Mod: 25

## 2019-05-21 PROCEDURE — 93000 ELECTROCARDIOGRAM COMPLETE: CPT | Mod: 59

## 2019-07-30 ENCOUNTER — APPOINTMENT (OUTPATIENT)
Dept: CARDIOLOGY | Facility: CLINIC | Age: 69
End: 2019-07-30
Payer: MEDICARE

## 2019-07-30 PROCEDURE — 93000 ELECTROCARDIOGRAM COMPLETE: CPT

## 2019-07-30 PROCEDURE — 99213 OFFICE O/P EST LOW 20 MIN: CPT

## 2019-09-26 ENCOUNTER — APPOINTMENT (OUTPATIENT)
Dept: CARDIOLOGY | Facility: CLINIC | Age: 69
End: 2019-09-26
Payer: MEDICARE

## 2019-09-26 VITALS — WEIGHT: 195 LBS | BODY MASS INDEX: 36.85 KG/M2 | HEART RATE: 75 BPM

## 2019-09-26 VITALS — SYSTOLIC BLOOD PRESSURE: 132 MMHG | DIASTOLIC BLOOD PRESSURE: 76 MMHG

## 2019-09-26 PROCEDURE — 93000 ELECTROCARDIOGRAM COMPLETE: CPT

## 2019-09-26 PROCEDURE — 99213 OFFICE O/P EST LOW 20 MIN: CPT

## 2019-09-26 NOTE — PHYSICAL EXAM
[General Appearance - Well Developed] : well developed [Normal Oral Mucosa] : normal oral mucosa [General Appearance - Well Nourished] : well nourished [Normal Oropharynx] : normal oropharynx [Normal Jugular Venous A Waves Present] : normal jugular venous A waves present [Normal Jugular Venous V Waves Present] : normal jugular venous V waves present [No Jugular Venous Carrizales A Waves] : no jugular venous carrizales A waves [Respiration, Rhythm And Depth] : normal respiratory rhythm and effort [Auscultation Breath Sounds / Voice Sounds] : lungs were clear to auscultation bilaterally [Heart Rate And Rhythm] : heart rate and rhythm were normal [Heart Sounds] : normal S1 and S2 [Bowel Sounds] : normal bowel sounds [Arterial Pulses Normal] : the arterial pulses were normal [Abdomen Soft] : soft [Abnormal Walk] : normal gait [Nail Clubbing] : no clubbing of the fingernails [Cyanosis, Localized] : no localized cyanosis [Skin Color & Pigmentation] : normal skin color and pigmentation [Skin Turgor] : normal skin turgor [] : no rash [Impaired Insight] : insight and judgment were intact [Oriented To Time, Place, And Person] : oriented to person, place, and time [No Anxiety] : not feeling anxious

## 2019-09-26 NOTE — ASSESSMENT
[FreeTextEntry1] : 69 years old female with pmh of HTN, CAD, S/P PTCA with stent 2002, DM, Symptomatic   PAF dx in 10/2018 \par on Sotalol 80 mg BID for rhythm control \par \par Reports feeling well, tolerating meds well \par \par Plan \par 1- Continue  Eliquis 5 mg BID ( CHADS- VASc - 4, CAD, DM, FEMALE, HTN ) \par 2. Continue Sotalol 80 mg BID \par

## 2019-09-26 NOTE — HISTORY OF PRESENT ILLNESS
[FreeTextEntry1] : 69 years old female with PAF dx in 10/2018 , on Sotalol 80 mg BID and Eliqus 10/31/2018 she returns for a follow up . \par \par She denies CP/SOB.  Reports rare palpitations , brief in duration lasting seconds .  \par No dizziness, or syncope .Good activity tolerance \par Denies PEREZ \par \par EKG SR 75 bpm Qtc 440\par ECG ( 3/21/2019) - NSR at 81 bpm, QTc 432 ms , non specific TWI \par ECG ( 5/14/2018)- NSR at 66 bpm, normal NH, QRS, QTc 444 ms

## 2019-10-17 ENCOUNTER — APPOINTMENT (OUTPATIENT)
Dept: CARDIOLOGY | Facility: CLINIC | Age: 69
End: 2019-10-17
Payer: MEDICARE

## 2019-10-17 PROCEDURE — 99213 OFFICE O/P EST LOW 20 MIN: CPT | Mod: 25

## 2019-10-17 PROCEDURE — 93978 VASCULAR STUDY: CPT

## 2019-10-17 PROCEDURE — 93000 ELECTROCARDIOGRAM COMPLETE: CPT | Mod: 59

## 2020-02-13 ENCOUNTER — APPOINTMENT (OUTPATIENT)
Dept: CARDIOLOGY | Facility: CLINIC | Age: 70
End: 2020-02-13
Payer: MEDICARE

## 2020-02-13 PROCEDURE — 93000 ELECTROCARDIOGRAM COMPLETE: CPT

## 2020-02-13 PROCEDURE — 99213 OFFICE O/P EST LOW 20 MIN: CPT

## 2020-06-03 ENCOUNTER — APPOINTMENT (OUTPATIENT)
Dept: CARDIOLOGY | Facility: CLINIC | Age: 70
End: 2020-06-03
Payer: MEDICARE

## 2020-06-03 PROCEDURE — 99214 OFFICE O/P EST MOD 30 MIN: CPT | Mod: 95

## 2020-06-03 NOTE — PHYSICAL EXAM
[General Appearance - Well Nourished] : well nourished [General Appearance - Well Developed] : well developed [Normal Oral Mucosa] : normal oral mucosa [Normal Oropharynx] : normal oropharynx [Normal Jugular Venous A Waves Present] : normal jugular venous A waves present [No Jugular Venous Carrizales A Waves] : no jugular venous carrizales A waves [Normal Jugular Venous V Waves Present] : normal jugular venous V waves present [Bowel Sounds] : normal bowel sounds [Abdomen Soft] : soft [Abnormal Walk] : normal gait [Nail Clubbing] : no clubbing of the fingernails [Skin Color & Pigmentation] : normal skin color and pigmentation [Skin Turgor] : normal skin turgor [Cyanosis, Localized] : no localized cyanosis [] : no rash [Impaired Insight] : insight and judgment were intact [Oriented To Time, Place, And Person] : oriented to person, place, and time [No Anxiety] : not feeling anxious

## 2020-06-03 NOTE — ASSESSMENT
[FreeTextEntry1] : 70 years old female with pmh of HTN, CAD, S/P PTCA with stent 2002, DM, Symptomatic   PAF dx in 10/2018 \par on Sotalol 80 mg BID for rhythm control \par \par Reports feeling well, tolerating meds well \par \par Plan \par 1- Continue  Eliquis 5 mg BID ( CHADS- VASc - 4, CAD, DM, FEMALE, HTN ) \par 2. Continue Sotalol 80 mg BID \par 4. will obtain labs from April 2020\par

## 2020-06-04 ENCOUNTER — APPOINTMENT (OUTPATIENT)
Dept: CARDIOLOGY | Facility: CLINIC | Age: 70
End: 2020-06-04

## 2020-06-23 ENCOUNTER — APPOINTMENT (OUTPATIENT)
Dept: CARDIOLOGY | Facility: CLINIC | Age: 70
End: 2020-06-23
Payer: MEDICARE

## 2020-06-23 PROCEDURE — 99214 OFFICE O/P EST MOD 30 MIN: CPT

## 2020-06-23 PROCEDURE — 93000 ELECTROCARDIOGRAM COMPLETE: CPT

## 2020-08-11 ENCOUNTER — RECORD ABSTRACTING (OUTPATIENT)
Age: 70
End: 2020-08-11

## 2020-08-11 DIAGNOSIS — I35.0 NONRHEUMATIC AORTIC (VALVE) STENOSIS: ICD-10-CM

## 2020-08-11 DIAGNOSIS — I34.0 NONRHEUMATIC MITRAL (VALVE) INSUFFICIENCY: ICD-10-CM

## 2020-08-11 DIAGNOSIS — R55 SYNCOPE AND COLLAPSE: ICD-10-CM

## 2020-08-11 DIAGNOSIS — Z87.898 PERSONAL HISTORY OF OTHER SPECIFIED CONDITIONS: ICD-10-CM

## 2020-08-11 DIAGNOSIS — Z86.79 PERSONAL HISTORY OF OTHER DISEASES OF THE CIRCULATORY SYSTEM: ICD-10-CM

## 2020-08-13 ENCOUNTER — INPATIENT (INPATIENT)
Facility: HOSPITAL | Age: 70
LOS: 1 days | Discharge: HOME | End: 2020-08-15
Attending: INTERNAL MEDICINE | Admitting: INTERNAL MEDICINE
Payer: MEDICARE

## 2020-08-13 VITALS
TEMPERATURE: 98 F | SYSTOLIC BLOOD PRESSURE: 227 MMHG | DIASTOLIC BLOOD PRESSURE: 91 MMHG | WEIGHT: 190.04 LBS | OXYGEN SATURATION: 99 % | HEART RATE: 69 BPM | RESPIRATION RATE: 18 BRPM

## 2020-08-13 DIAGNOSIS — Z95.5 PRESENCE OF CORONARY ANGIOPLASTY IMPLANT AND GRAFT: Chronic | ICD-10-CM

## 2020-08-13 PROCEDURE — 99285 EMERGENCY DEPT VISIT HI MDM: CPT

## 2020-08-14 LAB
ALBUMIN SERPL ELPH-MCNC: 4.4 G/DL — SIGNIFICANT CHANGE UP (ref 3.5–5.2)
ALP SERPL-CCNC: 54 U/L — SIGNIFICANT CHANGE UP (ref 30–115)
ALT FLD-CCNC: 15 U/L — SIGNIFICANT CHANGE UP (ref 0–41)
ANION GAP SERPL CALC-SCNC: 9 MMOL/L — SIGNIFICANT CHANGE UP (ref 7–14)
AST SERPL-CCNC: 18 U/L — SIGNIFICANT CHANGE UP (ref 0–41)
BASOPHILS # BLD AUTO: 0.03 K/UL — SIGNIFICANT CHANGE UP (ref 0–0.2)
BASOPHILS NFR BLD AUTO: 0.6 % — SIGNIFICANT CHANGE UP (ref 0–1)
BILIRUB SERPL-MCNC: 2.3 MG/DL — HIGH (ref 0.2–1.2)
BUN SERPL-MCNC: 18 MG/DL — SIGNIFICANT CHANGE UP (ref 10–20)
CALCIUM SERPL-MCNC: 9.8 MG/DL — SIGNIFICANT CHANGE UP (ref 8.5–10.1)
CHLORIDE SERPL-SCNC: 99 MMOL/L — SIGNIFICANT CHANGE UP (ref 98–110)
CK MB CFR SERPL CALC: 2.7 NG/ML — SIGNIFICANT CHANGE UP (ref 0.6–6.3)
CK SERPL-CCNC: 65 U/L — SIGNIFICANT CHANGE UP (ref 0–225)
CO2 SERPL-SCNC: 26 MMOL/L — SIGNIFICANT CHANGE UP (ref 17–32)
CREAT SERPL-MCNC: 0.8 MG/DL — SIGNIFICANT CHANGE UP (ref 0.7–1.5)
EOSINOPHIL # BLD AUTO: 0.13 K/UL — SIGNIFICANT CHANGE UP (ref 0–0.7)
EOSINOPHIL NFR BLD AUTO: 2.7 % — SIGNIFICANT CHANGE UP (ref 0–8)
GLUCOSE BLDC GLUCOMTR-MCNC: 182 MG/DL — HIGH (ref 70–99)
GLUCOSE BLDC GLUCOMTR-MCNC: 189 MG/DL — HIGH (ref 70–99)
GLUCOSE BLDC GLUCOMTR-MCNC: 210 MG/DL — HIGH (ref 70–99)
GLUCOSE BLDC GLUCOMTR-MCNC: 278 MG/DL — HIGH (ref 70–99)
GLUCOSE SERPL-MCNC: 120 MG/DL — HIGH (ref 70–99)
HCT VFR BLD CALC: 37.1 % — SIGNIFICANT CHANGE UP (ref 37–47)
HGB BLD-MCNC: 12.4 G/DL — SIGNIFICANT CHANGE UP (ref 12–16)
IMM GRANULOCYTES NFR BLD AUTO: 0 % — LOW (ref 0.1–0.3)
LIDOCAIN IGE QN: 25 U/L — SIGNIFICANT CHANGE UP (ref 7–60)
LYMPHOCYTES # BLD AUTO: 2.26 K/UL — SIGNIFICANT CHANGE UP (ref 1.2–3.4)
LYMPHOCYTES # BLD AUTO: 46.7 % — SIGNIFICANT CHANGE UP (ref 20.5–51.1)
MCHC RBC-ENTMCNC: 30.9 PG — SIGNIFICANT CHANGE UP (ref 27–31)
MCHC RBC-ENTMCNC: 33.4 G/DL — SIGNIFICANT CHANGE UP (ref 32–37)
MCV RBC AUTO: 92.5 FL — SIGNIFICANT CHANGE UP (ref 81–99)
MONOCYTES # BLD AUTO: 0.55 K/UL — SIGNIFICANT CHANGE UP (ref 0.1–0.6)
MONOCYTES NFR BLD AUTO: 11.4 % — HIGH (ref 1.7–9.3)
NEUTROPHILS # BLD AUTO: 1.87 K/UL — SIGNIFICANT CHANGE UP (ref 1.4–6.5)
NEUTROPHILS NFR BLD AUTO: 38.6 % — LOW (ref 42.2–75.2)
NRBC # BLD: 0 /100 WBCS — SIGNIFICANT CHANGE UP (ref 0–0)
PLATELET # BLD AUTO: 229 K/UL — SIGNIFICANT CHANGE UP (ref 130–400)
POTASSIUM SERPL-MCNC: 4.6 MMOL/L — SIGNIFICANT CHANGE UP (ref 3.5–5)
POTASSIUM SERPL-SCNC: 4.6 MMOL/L — SIGNIFICANT CHANGE UP (ref 3.5–5)
PROT SERPL-MCNC: 7.1 G/DL — SIGNIFICANT CHANGE UP (ref 6–8)
RBC # BLD: 4.01 M/UL — LOW (ref 4.2–5.4)
RBC # FLD: 13.4 % — SIGNIFICANT CHANGE UP (ref 11.5–14.5)
SARS-COV-2 RNA SPEC QL NAA+PROBE: SIGNIFICANT CHANGE UP
SODIUM SERPL-SCNC: 134 MMOL/L — LOW (ref 135–146)
TROPONIN T SERPL-MCNC: 0.03 NG/ML — CRITICAL HIGH
TROPONIN T SERPL-MCNC: 0.03 NG/ML — CRITICAL HIGH
TROPONIN T SERPL-MCNC: 0.04 NG/ML — CRITICAL HIGH
TROPONIN T SERPL-MCNC: <0.01 NG/ML — SIGNIFICANT CHANGE UP
WBC # BLD: 4.84 K/UL — SIGNIFICANT CHANGE UP (ref 4.8–10.8)
WBC # FLD AUTO: 4.84 K/UL — SIGNIFICANT CHANGE UP (ref 4.8–10.8)

## 2020-08-14 PROCEDURE — 93010 ELECTROCARDIOGRAM REPORT: CPT

## 2020-08-14 PROCEDURE — 93306 TTE W/DOPPLER COMPLETE: CPT | Mod: 26

## 2020-08-14 PROCEDURE — 71046 X-RAY EXAM CHEST 2 VIEWS: CPT | Mod: 26

## 2020-08-14 PROCEDURE — 71275 CT ANGIOGRAPHY CHEST: CPT | Mod: 26

## 2020-08-14 PROCEDURE — 93010 ELECTROCARDIOGRAM REPORT: CPT | Mod: 76,77

## 2020-08-14 RX ORDER — LEVOTHYROXINE SODIUM 125 MCG
88 TABLET ORAL DAILY
Refills: 0 | Status: DISCONTINUED | OUTPATIENT
Start: 2020-08-14 | End: 2020-08-15

## 2020-08-14 RX ORDER — LOSARTAN POTASSIUM 100 MG/1
100 TABLET, FILM COATED ORAL DAILY
Refills: 0 | Status: DISCONTINUED | OUTPATIENT
Start: 2020-08-14 | End: 2020-08-15

## 2020-08-14 RX ORDER — AMLODIPINE BESYLATE 2.5 MG/1
2.5 TABLET ORAL
Refills: 0 | Status: DISCONTINUED | OUTPATIENT
Start: 2020-08-14 | End: 2020-08-15

## 2020-08-14 RX ORDER — PANTOPRAZOLE SODIUM 20 MG/1
40 TABLET, DELAYED RELEASE ORAL
Refills: 0 | Status: DISCONTINUED | OUTPATIENT
Start: 2020-08-14 | End: 2020-08-15

## 2020-08-14 RX ORDER — CHLORHEXIDINE GLUCONATE 213 G/1000ML
1 SOLUTION TOPICAL
Refills: 0 | Status: DISCONTINUED | OUTPATIENT
Start: 2020-08-14 | End: 2020-08-15

## 2020-08-14 RX ORDER — INSULIN GLARGINE 100 [IU]/ML
20 INJECTION, SOLUTION SUBCUTANEOUS AT BEDTIME
Refills: 0 | Status: DISCONTINUED | OUTPATIENT
Start: 2020-08-14 | End: 2020-08-15

## 2020-08-14 RX ORDER — DEXTROSE 50 % IN WATER 50 %
12.5 SYRINGE (ML) INTRAVENOUS ONCE
Refills: 0 | Status: DISCONTINUED | OUTPATIENT
Start: 2020-08-14 | End: 2020-08-15

## 2020-08-14 RX ORDER — DEXTROSE 50 % IN WATER 50 %
25 SYRINGE (ML) INTRAVENOUS ONCE
Refills: 0 | Status: DISCONTINUED | OUTPATIENT
Start: 2020-08-14 | End: 2020-08-15

## 2020-08-14 RX ORDER — SOTALOL HCL 120 MG
80 TABLET ORAL
Refills: 0 | Status: DISCONTINUED | OUTPATIENT
Start: 2020-08-14 | End: 2020-08-15

## 2020-08-14 RX ORDER — INSULIN LISPRO 100/ML
7 VIAL (ML) SUBCUTANEOUS
Refills: 0 | Status: DISCONTINUED | OUTPATIENT
Start: 2020-08-14 | End: 2020-08-15

## 2020-08-14 RX ORDER — SODIUM CHLORIDE 9 MG/ML
1000 INJECTION, SOLUTION INTRAVENOUS
Refills: 0 | Status: DISCONTINUED | OUTPATIENT
Start: 2020-08-14 | End: 2020-08-15

## 2020-08-14 RX ORDER — APIXABAN 2.5 MG/1
5 TABLET, FILM COATED ORAL EVERY 12 HOURS
Refills: 0 | Status: DISCONTINUED | OUTPATIENT
Start: 2020-08-14 | End: 2020-08-15

## 2020-08-14 RX ORDER — ASPIRIN/CALCIUM CARB/MAGNESIUM 324 MG
325 TABLET ORAL ONCE
Refills: 0 | Status: COMPLETED | OUTPATIENT
Start: 2020-08-14 | End: 2020-08-14

## 2020-08-14 RX ORDER — ASPIRIN/CALCIUM CARB/MAGNESIUM 324 MG
81 TABLET ORAL DAILY
Refills: 0 | Status: DISCONTINUED | OUTPATIENT
Start: 2020-08-14 | End: 2020-08-15

## 2020-08-14 RX ORDER — DIPHENHYDRAMINE HCL 50 MG
50 CAPSULE ORAL ONCE
Refills: 0 | Status: COMPLETED | OUTPATIENT
Start: 2020-08-14 | End: 2020-08-14

## 2020-08-14 RX ORDER — INSULIN LISPRO 100/ML
VIAL (ML) SUBCUTANEOUS
Refills: 0 | Status: DISCONTINUED | OUTPATIENT
Start: 2020-08-14 | End: 2020-08-15

## 2020-08-14 RX ORDER — NITROGLYCERIN 6.5 MG
0.4 CAPSULE, EXTENDED RELEASE ORAL
Refills: 0 | Status: DISCONTINUED | OUTPATIENT
Start: 2020-08-14 | End: 2020-08-15

## 2020-08-14 RX ORDER — ATORVASTATIN CALCIUM 80 MG/1
80 TABLET, FILM COATED ORAL AT BEDTIME
Refills: 0 | Status: DISCONTINUED | OUTPATIENT
Start: 2020-08-14 | End: 2020-08-15

## 2020-08-14 RX ORDER — DEXTROSE 50 % IN WATER 50 %
15 SYRINGE (ML) INTRAVENOUS ONCE
Refills: 0 | Status: DISCONTINUED | OUTPATIENT
Start: 2020-08-14 | End: 2020-08-15

## 2020-08-14 RX ORDER — GLUCAGON INJECTION, SOLUTION 0.5 MG/.1ML
1 INJECTION, SOLUTION SUBCUTANEOUS ONCE
Refills: 0 | Status: DISCONTINUED | OUTPATIENT
Start: 2020-08-14 | End: 2020-08-15

## 2020-08-14 RX ADMIN — Medication 81 MILLIGRAM(S): at 11:31

## 2020-08-14 RX ADMIN — ATORVASTATIN CALCIUM 80 MILLIGRAM(S): 80 TABLET, FILM COATED ORAL at 21:52

## 2020-08-14 RX ADMIN — Medication 7 UNIT(S): at 17:39

## 2020-08-14 RX ADMIN — Medication 3: at 12:34

## 2020-08-14 RX ADMIN — Medication 125 MILLIGRAM(S): at 00:55

## 2020-08-14 RX ADMIN — Medication 325 MILLIGRAM(S): at 04:43

## 2020-08-14 RX ADMIN — Medication 80 MILLIGRAM(S): at 17:39

## 2020-08-14 RX ADMIN — Medication 2: at 17:39

## 2020-08-14 RX ADMIN — LOSARTAN POTASSIUM 100 MILLIGRAM(S): 100 TABLET, FILM COATED ORAL at 11:31

## 2020-08-14 RX ADMIN — Medication 50 MILLIGRAM(S): at 00:55

## 2020-08-14 RX ADMIN — AMLODIPINE BESYLATE 2.5 MILLIGRAM(S): 2.5 TABLET ORAL at 17:39

## 2020-08-14 RX ADMIN — APIXABAN 5 MILLIGRAM(S): 2.5 TABLET, FILM COATED ORAL at 17:39

## 2020-08-14 RX ADMIN — Medication 7 UNIT(S): at 12:33

## 2020-08-14 RX ADMIN — INSULIN GLARGINE 20 UNIT(S): 100 INJECTION, SOLUTION SUBCUTANEOUS at 21:52

## 2020-08-14 NOTE — H&P ADULT - NSHPREVIEWOFSYSTEMS_GEN_ALL_CORE
REVIEW OF SYSTEMS:    CONSTITUTIONAL: No weakness, fevers or chills  EYES/ENT: No visual changes;  No vertigo or throat pain   NECK: No pain or stiffness  RESPIRATORY: No cough, wheezing, hemoptysis; No shortness of breath  CARDIOVASCULAR: No chest pain or palpitations  GASTROINTESTINAL: No abdominal or epigastric pain. No nausea, vomiting, or hematemesis; No diarrhea or constipation. No melena or hematochezia.  GENITOURINARY: No dysuria, frequency or hematuria  NEUROLOGICAL: No numbness or weakness  SKIN: No itching, rashes REVIEW OF SYSTEMS:    CONSTITUTIONAL: Endorses fatigue. No weakness, fevers or chills  EYES/ENT: No visual changes;  No vertigo or throat pain   NECK: No pain or stiffness  RESPIRATORY: No cough, wheezing, hemoptysis; No shortness of breath  CARDIOVASCULAR: chest pain tightness. No palpitations  GASTROINTESTINAL: No abdominal or epigastric pain. No nausea, vomiting, or hematemesis; No diarrhea or constipation. No melena or hematochezia.  GENITOURINARY: No dysuria, frequency or hematuria  NEUROLOGICAL: No numbness or weakness  SKIN: No itching, rashes

## 2020-08-14 NOTE — CONSULT NOTE ADULT - ASSESSMENT
IMPRESSION:  - Atypical cp / rule out UA  - CAD s/p mid lad stent '02 (last cath 2017 with mild diffuse in stent restenosis)  - HTN emergency      PLAN:  - bp now controlled ~130s. (pt states prior to last night, her usual is ~130s, she checks her bp at home)  - resume home bp medications  - recheck ekg + trop in am  - will d/w attending in am re: stress vs cath (given hx, may be prudent to cath as previous symptoms have been atypical as well, and last cath 3 years ago did show mild instent restenosis).   - currently without cp. however if any recurrence of cp overnight, notify cardio fellow, start pt on hep gtt.       >> will d/w attending in am. IMPRESSION:  - Atypical cp / rule out UA  - CAD s/p mid lad stent '02 (last cath 2017 with mild diffuse in stent restenosis)  - HTN emergency (currently sbp back to 130s)  - AF on eliquis (currently in sinus)      PLAN:  - bp now controlled ~130s. (pt states prior to last night, her usual is ~130s, she checks her bp at home)  - resume home bp medications  - recheck ekg + trop in am  - will d/w attending in am re: stress vs cath (given hx, may be prudent to cath as previous symptoms have been atypical as well, and last cath 3 years ago did show mild instent restenosis).         >> will d/w attending in am. IMPRESSION:  - Atypical cp / rule out UA  - CAD s/p mid lad stent '02 (last cath 2017 with mild diffuse in stent restenosis)  - HTN emergency (currently sbp back to 130s)  - AF on eliquis (currently in sinus)      PLAN:  - bp now controlled ~130s. (pt states prior to last night, her usual is ~130s, she checks her bp at home)  - resume home bp medications  - Maintain present antihypertensive medications given that the pt's BP has normalized and her symptoms have resolved.  - will allow patient to have an outpatient stress echo next week with Dr. Edouard and this is already scheduled.

## 2020-08-14 NOTE — ED PROVIDER NOTE - NS ED ROS FT
Constitutional: (-) fever  Eyes/ENT: (-) runny nose  Cardiovascular: (+) chest pain, (-) syncope  Respiratory: (-) cough, (-) shortness of breath  Gastrointestinal: (-) vomiting, (-) diarrhea, (-) abdominal pain  : (-) dysuria   Musculoskeletal: (-) back pain, (-) joint pain  Integumentary: (-) rash  Neurological: (-)loc  Allergic/Immunologic: (-) pruritus  Endocrine: (-) history of thyroid disease

## 2020-08-14 NOTE — ED PROVIDER NOTE - OBJECTIVE STATEMENT
70F with pmh of AF on Eliquis, CAD with stents, HTN, HLD, DM and hypothryoidism presents with CP radiating to neck and back beginning yesterday and worsening today. No alleviating/worsening symptoms. No other associated symptoms. Denies fever, chills, cough, SOB, n/v/d, BRBPR, dysuria, hematuria, numbness, or tingling.  Cards: Dickson

## 2020-08-14 NOTE — ED PROVIDER NOTE - PROGRESS NOTE DETAILS
Allergy to IV contrast in the 80's, patient had hives after CT. Since then has received CT scans with pretreatment since then and did not have any allergic reaction symptoms. No anaphylaxis requiring epinephrine with IV contrast. Patient amenable to receiving contrast with pre-treatment. Risks/benefits discussed. SC: Labs, EKG and CT reassuring. Will admit for CP evaluation and stress test. Patient to be admitted to an inpatient floor. Case discussed with and care endorsed to medical admitting resident. Admitting physician notified.

## 2020-08-14 NOTE — PROGRESS NOTE ADULT - SUBJECTIVE AND OBJECTIVE BOX
DERRICK DORMAN 71yo  Female from home came to the ER for c/o midsternal CP, pressure like, 8/10 radiating to the back.  The pt states the she had posterior neck pain thprior and thought it was due to "  sleeping wrong"  but sine the CP did not subside she deciced to com to the ER. The pt denies SOB, Palp, diaphoresis or N or V.  The pt hat CT angio of the chest which showed NO evidence of disection, the EKG is normal, the trop is 0.03..  The pt is admitted to [facility] for further cardiac w/up and observation.  The PMHx includes:  HTN, ASHD, CAD, sp stent in 202, afib on Eliquis, previous ECHO showed EF 67%, mod PFO with L to R shunt, DLD, DM II, Hypothyroidism,  GERD, Diverticulosis, BMI 37, OA, DDD, DJD, Br Asthma.    INTERVAL HPI/OVERNIGHT EVENTS: pt on tele, feels better    MEDICATIONS  (STANDING):  amLODIPine Oral Tab/Cap - Peds 2.5 milliGRAM(s) Oral two times a day  apixaban 5 milliGRAM(s) Oral every 12 hours  aspirin enteric coated 81 milliGRAM(s) Oral daily  atorvastatin 80 milliGRAM(s) Oral at bedtime  chlorhexidine 4% Liquid 1 Application(s) Topical <User Schedule>  dextrose 5%. 1000 milliLiter(s) (50 mL/Hr) IV Continuous <Continuous>  dextrose 50% Injectable 12.5 Gram(s) IV Push once  dextrose 50% Injectable 25 Gram(s) IV Push once  dextrose 50% Injectable 25 Gram(s) IV Push once  insulin glargine Injectable (LANTUS) 20 Unit(s) SubCutaneous at bedtime  insulin lispro (HumaLOG) corrective regimen sliding scale   SubCutaneous three times a day before meals  insulin lispro Injectable (HumaLOG) 7 Unit(s) SubCutaneous three times a day before meals  levothyroxine 88 MICROGram(s) Oral daily  losartan 100 milliGRAM(s) Oral daily  pantoprazole    Tablet 40 milliGRAM(s) Oral before breakfast  sotalol 80 milliGRAM(s) Oral two times a day    MEDICATIONS  (PRN):  dextrose 40% Gel 15 Gram(s) Oral once PRN Blood Glucose LESS THAN 70 milliGRAM(s)/deciliter  glucagon  Injectable 1 milliGRAM(s) IntraMuscular once PRN Glucose LESS THAN 70 milligrams/deciliter  nitroglycerin     SubLingual 0.4 milliGRAM(s) SubLingual every 5 minutes PRN Chest Pain      Allergies    IV Contrast (Hives)    Intolerances        Vital Signs Last 24 Hrs  Wt:  87kg  BMI:  37  T(C): 36.1 (14 Aug 2020 20:48), Max: 36.6 (13 Aug 2020 23:55)  T(F): 97 (14 Aug 2020 20:48), Max: 97.9 (13 Aug 2020 23:55)  HR: 70 (14 Aug 2020 20:48) (67 - 80)  BP: 139/64 (14 Aug 2020 20:48) (134/65 - 227/91)  BP(mean): --  RR: 18 (14 Aug 2020 20:48) (16 - 18)  SpO2: 98% (14 Aug 2020 19:56) (98% - 99%)    PHYSICAL EXAM:      Constitutional:  A&O, overweight F in NAD    Eyes:  non icteric    ENMT: dry oral mucosa no lesions    Neck: supple, no JVD, no bruits, + posterior and trapezius mm spasm and tenderness    Respiratory: shallow resp, no wheezing, rhonchi or crackles or rales    Cardiovascular:  S1S2 reg    Gastrointestinal:  globular soft and benign, + BS    Genitourinary:  no parisi    Extremities:  moves all ext, + arthritic changes    Vascular: + pedal pulses    Neurological:  nonfocal    Skin:  no rash        LABS:                        12.4   4.84  )-----------( 229      ( 14 Aug 2020 00:35 )             37.1     08-14    134<L>  |  99  |  18  ----------------------------<  120<H>  4.6   |  26  |  0.8  GFR 75  Ca 9.8  COVID neg  trop 0.04, 0.03, 0.03  CK 6.5, MB 2.7    TPro  7.1  /  Alb  4.4  /  TBili  2.3<H>  /  DBili  x   /  AST  18  /  ALT  15  /  AlkPhos  54  08-14          RADIOLOGY & ADDITIONAL TESTS:  CXR:  no infiltrats  EKG:  NSR 64/min, no acute changes  CT angio of chest and abd:  no evidence of dissection, heart not enlarged, no pericardial fluid, scattered soft and calcified plaques in the aorta, calcifications of the coronaries, no sig abd cjanges, L 5.3cm adnexal cystic lesion DERRICK DORMAN 71yo  Female from home came to the ER for c/o midsternal CP, pressure like, 8/10 radiating to the back assoc with elevate BP.  The pt states that she had posterior neck pain prior and thought it was due to "  sleeping wrong"  but since the CP did not subside and her BP readings were  high 190-200 sys she decided to come to the ER. The pt denies SOB, Palp, diaphoresis or N or V.  The pt hat CT angio of the chest which showed NO evidence of dissection, the EKG is normal, the trop is 0.03..  The pt is admitted to [facility] for further cardiac w/up and observation.  The PMHx includes:  HTN, ASHD, CAD, sp stent in 202, afib on Eliquis, previous ECHO showed EF 67%, mod PFO with L to R shunt, DLD, DM II, Hypothyroidism,  GERD, Diverticulosis, BMI 37, OA, DDD, DJD, Br Asthma.    INTERVAL HPI/OVERNIGHT EVENTS: pt on tele, feels better    MEDICATIONS  (STANDING):  amLODIPine Oral Tab/Cap - Peds 2.5 milliGRAM(s) Oral two times a day  apixaban 5 milliGRAM(s) Oral every 12 hours  aspirin enteric coated 81 milliGRAM(s) Oral daily  atorvastatin 80 milliGRAM(s) Oral at bedtime  chlorhexidine 4% Liquid 1 Application(s) Topical <User Schedule>  dextrose 5%. 1000 milliLiter(s) (50 mL/Hr) IV Continuous <Continuous>  dextrose 50% Injectable 12.5 Gram(s) IV Push once  dextrose 50% Injectable 25 Gram(s) IV Push once  dextrose 50% Injectable 25 Gram(s) IV Push once  insulin glargine Injectable (LANTUS) 20 Unit(s) SubCutaneous at bedtime  insulin lispro (HumaLOG) corrective regimen sliding scale   SubCutaneous three times a day before meals  insulin lispro Injectable (HumaLOG) 7 Unit(s) SubCutaneous three times a day before meals  levothyroxine 88 MICROGram(s) Oral daily  losartan 100 milliGRAM(s) Oral daily  pantoprazole    Tablet 40 milliGRAM(s) Oral before breakfast  sotalol 80 milliGRAM(s) Oral two times a day    MEDICATIONS  (PRN):  dextrose 40% Gel 15 Gram(s) Oral once PRN Blood Glucose LESS THAN 70 milliGRAM(s)/deciliter  glucagon  Injectable 1 milliGRAM(s) IntraMuscular once PRN Glucose LESS THAN 70 milligrams/deciliter  nitroglycerin     SubLingual 0.4 milliGRAM(s) SubLingual every 5 minutes PRN Chest Pain      Allergies    IV Contrast (Hives)    Intolerances        Vital Signs Last 24 Hrs  Wt:  87kg  BMI:  37  T(C): 36.1 (14 Aug 2020 20:48), Max: 36.6 (13 Aug 2020 23:55)  T(F): 97 (14 Aug 2020 20:48), Max: 97.9 (13 Aug 2020 23:55)  HR: 70 (14 Aug 2020 20:48) (67 - 80)  BP: 139/64 (14 Aug 2020 20:48) (134/65 - 227/91)  BP(mean): --  RR: 18 (14 Aug 2020 20:48) (16 - 18)  SpO2: 98% (14 Aug 2020 19:56) (98% - 99%)    PHYSICAL EXAM:      Constitutional:  A&O, overweight F in NAD    Eyes:  non icteric    ENMT: dry oral mucosa no lesions    Neck: supple, no JVD, no bruits, + posterior and trapezius mm spasm and tenderness    Respiratory: shallow resp, no wheezing, rhonchi or crackles or rales    Cardiovascular:  S1S2 reg    Gastrointestinal:  globular soft and benign, + BS    Genitourinary:  no parisi    Extremities:  moves all ext, + arthritic changes    Vascular: + pedal pulses    Neurological:  nonfocal    Skin:  no rash        LABS:                        12.4   4.84  )-----------( 229      ( 14 Aug 2020 00:35 )             37.1     08-14    134<L>  |  99  |  18  ----------------------------<  120<H>  4.6   |  26  |  0.8  GFR 75  Ca 9.8  COVID neg  trop 0.04, 0.03, 0.03  CK 6.5, MB 2.7    TPro  7.1  /  Alb  4.4  /  TBili  2.3<H>  /  DBili  x   /  AST  18  /  ALT  15  /  AlkPhos  54  08-14          RADIOLOGY & ADDITIONAL TESTS:  CXR:  no infiltrats  EKG:  NSR 64/min, no acute changes  CT angio of chest and abd:  no evidence of dissection, heart not enlarged, no pericardial fluid, scattered soft and calcified plaques in the aorta, calcifications of the coronaries, no sig abd cjanges, L 5.3cm adnexal cystic lesion

## 2020-08-14 NOTE — ED ADULT NURSE NOTE - PMH
Arthritis    Atrial fibrillation    Coronary heart disease    Diabetes    GERD (gastroesophageal reflux disease)    High cholesterol    Hypertension    Hypothyroid

## 2020-08-14 NOTE — H&P ADULT - NSICDXPASTMEDICALHX_GEN_ALL_CORE_FT
PAST MEDICAL HISTORY:  Arthritis     Atrial fibrillation     Coronary heart disease     Diabetes     GERD (gastroesophageal reflux disease)     High cholesterol     Hypertension     Hypothyroid

## 2020-08-14 NOTE — ED PROVIDER NOTE - ATTENDING CONTRIBUTION TO CARE
69 yo F pmh as stated in chart pw chest pain. +chest pain, neck pain, and upper abdominal pain. No fever/chills, no palpitations, no sob, no n/v, no headache, no facial droop, no vision change, no urinary sx, no flank pain, no diarrhea, no constipation.     CONSTITUTIONAL: Well-developed; well-nourished; in no acute distress. Sitting up and providing appropriate history and physical examination  SKIN: skin exam is warm and dry, no acute rash.  HEAD: Normocephalic; atraumatic.  EYES: PERRL, 3 mm bilateral, no nystagmus, EOM intact; conjunctiva and sclera clear.  ENT: No nasal discharge; airway clear.  NECK: Supple; non tender. + full passive ROM in all directions. No JVD  CARD: S1, S2 normal; no murmurs, gallops, or rubs. Regular rate and rhythm. + Symmetric Strong Pulses  RESP: No wheezes, rales or rhonchi. Good air movement bilaterally  ABD: +ttp over epigastrium. soft; non-distended. No Rebound, No Guarding, No signs of peritonitis, No CVA tenderness. No pulsatile abdominal mass. + Strong and Symmetric Pulses  EXT: Normal ROM. No clubbing, cyanosis or edema. Dp and Pt Pulses intact. Cap refill less than 3 seconds  NEURO: CN 2-12 intact, normal finger to nose, normal romberg, stable gait, no sensory or motor deficits, Alert, oriented, grossly unremarkable. No Focal deficits. GCS 15. NIH 0  PSYCH: Cooperative, appropriate.

## 2020-08-14 NOTE — PROGRESS NOTE ADULT - ASSESSMENT
Atypical Chest Pain, Dissection R/O by CT angio, R/O ACS  Hx of HTN, ASHD, CAD, sp stent 2002, PFO, afib ( Eliquis/AC)  Hx of DLD  Hx of DM II  Hx of Br Asthma  Hx of obesity, BMI 37  Hx of OA, DDD, DJD  Hx of GERD, Diverticulosis  L adnexal cystic mass  Pt is a [employer]    pt ad to tele for further cardiac w/up and observation  CT angio of chest showed no dissection, + solft and calcified plaques of aorta ant TH vessels, coronary calcifications, incidentaly noted 5.3 cm L adnexal cyst  cardio consult  ECHO  CE  cont all home meds  Tx of GERD, GI prophylaxis

## 2020-08-14 NOTE — H&P ADULT - ASSESSMENT
# C/p r/o ACS  - EKG showed NSR  - C/w   - Admit to tele  - Consider cardio c/s if chest pain recurs/worsens    # A 69 yo F with     # C/p r/o ACS  - EKG showed NSR  - C/w   - Admit to tele  - Consider cardio c/s if chest pain recurs/worsens    # A 71 yo F with PMHx of Sravan on Eliquis, CAD s/p stent 2002, HTN, DLD, DM, Hypothyroidism, GERD, asthma, OA, presents to ED c/o chest pain radiating to the back x 1 day.     # C/p r/o ACS  - Aortic dissection and PE ruled out via CT angio chest   - EKG showed NSR  - Echo 7/2020: EF 67%, GIDD, mod sized PFO with L -> R shunting across atrium   - C/w ASA  - F/u repeat echo  - Admit to tele  - Consider cardio c/s if chest pain recurs/worsens    # A.fib on Eliquis  # CAD s/p stent 2002, HTN, DLD, DM, Hypothyroidism, GERD, asthma, OA, A 71 yo F with PMHx of HERMILAfib on Eliquis, CAD s/p stent 2002, HTN, DLD, DM, Hypothyroidism, GERD, asthma, OA, presents to ED c/o chest pain radiating to the back x 1 day.     # C/p r/o ACS  - Aortic dissection and PE ruled out via CT angio chest   - EKG showed NSR  - Echo 7/2020: EF 67%, GIDD, mod sized PFO with L -> R shunting across atrium   - C/w ASA  - F/u repeat echo  - Admit to tele  - Consider cardio c/s if chest pain recurs/worsens    # A.fib on Eliquis  - EKG showed NSR, HR 60s  - C/w Eliquis and Sotalol     # CAD s/p stent 2002  - C/w ASA    # HTN  - /65  - C/w Amlodipine 2.5mg PO BID and Losartan 100mg PO QD    # DLD  - C/w Lipitor 80mg PO QD     # DM  -   - Lantus/Lispro     # Hypothyroidism  - C/w Levo 112 cmg PO QD    # GERD  - Protonix 40mg PO QD     # Asthma  - Controlled off meds. No wheezing    # OA  - Off meds. Denies pain    DVT ppx: Eliquis   GI ppx: Protonix   Diet: DASH/TLC   Activity: IAT   Dispo: from home, admit to tele A 69 yo F with PMHx of Sravan on Eliquis, CAD s/p stent 2002, HTN, DLD, DM, Hypothyroidism, GERD, asthma, OA, presents to ED c/o chest pain radiating to the back x 1 day.     PT IS [facility]EHOVA[employer]S WITNESS AND CANNOT RECEIVE BLOOD TRANSFUSIONS    # C/p r/o ACS  - Atypical chest pain: substernal, not provoked by exertion  - Unlikely anginal, possibly due to GERD   - Aortic dissection and PE ruled out via CT angio chest   - Not in Afib with RVR. EKG showed NSR, HR 65  - Echo 7/2018: EF 67%, GIDD, mod sized PFO with L -> R shunting across atrium   - C/w ASA and Nitro for c/p  - F/u repeat echo  - F/u Cardio c/s Dr. Edouard  - Admit to tele    # Sravan on Eliquis  - EKG showed NSR, HR 60s  - C/w Eliquis and Sotalol     # CAD s/p stent 2002  - C/w ASA    # HTN  - /65  - C/w Amlodipine 2.5mg PO BID and Losartan 100mg PO QD    # DLD  - C/w Lipitor 80mg PO QD     # DM  -   - Lantus/Lispro 20/7/7/7  - Monitor FS    # Hypothyroidism  - C/w Levo 112 cmg PO QD    # GERD  - Protonix 40mg PO QD     # Asthma  - Controlled off meds. No wheezing    # OA  - Off meds. Denies pain    DVT ppx: Eliquis   GI ppx: Protonix   Diet: DASH/TLC   Activity: IAT   Dispo: from home, admit to tele A 69 yo F with PMHx of Sravan on Eliquis, CAD s/p stent 2002, HTN, DLD, DM, Hypothyroidism, GERD, asthma, OA, presents to ED c/o chest pain radiating to the back x 1 day.     PT IS [facility]EHOVA[employer] WITNESS AND CANNOT RECEIVE BLOOD TRANSFUSIONS    # C/p r/o ACS  - Atypical chest pain: substernal, not provoked by exertion  - Unlikely anginal, possibly due to GERD   - Aortic dissection and PE ruled out via CT angio chest   - Not in Afib with RVR. EKG showed NSR, HR 64, no ST elevations or depressions  - First trop (-)  - Echo 7/2018: EF 67%, GIDD, mod sized PFO with L -> R shunting across atrium   - C/w ASA and Nitro for c/p  - F/u trop 11:00 and 16:00  - F/u repeat echo  - F/u Cardio c/s Dr. Edouard  - Admit to tele    # CHONG.fib on Eliquis  - EKG showed NSR, HR 60s  - C/w Eliquis and Sotalol     # CAD s/p stent 2002  - C/w ASA    # HTN  - /65  - C/w Amlodipine 2.5mg PO BID and Losartan 100mg PO QD    # DLD  - C/w Lipitor 80mg PO QD     # DM  -   - Lantus/Lispro 20/7/7/7  - Monitor FS    # Hypothyroidism  - C/w Levo 112 cmg PO QD    # GERD  - Protonix 40mg PO QD     # Asthma  - Controlled off meds. No wheezing    # OA  - Off meds. Denies pain    DVT ppx: Eliquis   GI ppx: Protonix   Diet: DASH/TLC   Activity: IAT   Dispo: from home, admit to tele A 71 yo F with PMHx of Sravan on Eliquis, CAD s/p stent 2002, HTN, DLD, DM, Hypothyroidism, GERD, asthma, OA, presents to ED c/o chest pain radiating to the back x 1 day.     PT IS [facility]EH[facility]S WITNESS AND CANNOT RECEIVE BLOOD TRANSFUSIONS    # C/p r/o ACS  - Atypical chest pain: substernal, not provoked by exertion  - Unlikely anginal, possibly due to GERD   - Aortic dissection and PE ruled out via CT angio chest   - Not in Afib with RVR. EKG showed NSR, HR 64, no ST elevations or depressions  - First trop (-)  - Echo 7/2018: EF 67%, GIDD, mod sized PFO with L -> R shunting across atrium   - C/w ASA and Nitro for c/p  - F/u trop 11:00 and 16:00  - F/u repeat echo  - F/u COVID swab   - F/u Cardio c/s Dr. Edouard  - Admit to tele    # CHONG.fib on Eliquis  - EKG showed NSR, HR 60s  - C/w Eliquis and Sotalol     # CAD s/p stent 2002  - C/w ASA    # HTN  - /65  - C/w Amlodipine 2.5mg PO BID and Losartan 100mg PO QD    # DLD  - C/w Lipitor 80mg PO QD     # DM  -   - Lantus/Lispro 20/7/7/7  - Monitor FS    # Hypothyroidism  - C/w Levo 112 cmg PO QD    # GERD  - Protonix 40mg PO QD     # Asthma  - Controlled off meds. No wheezing    # OA  - Off meds. Denies pain    DVT ppx: Eliquis   GI ppx: Protonix   Diet: DASH/TLC   Activity: IAT   Dispo: from home, admit to tele

## 2020-08-14 NOTE — ED ADULT NURSE NOTE - OBJECTIVE STATEMENT
Presents to ED with chest pain 7/10 which started few hours prior. CP was like CP few years ago which ended up needing a stent placed.

## 2020-08-14 NOTE — H&P ADULT - NSHPSOCIALHISTORY_GEN_ALL_CORE
Marital Status:  (   )    (   ) Single    (   )    (  )   Lives with: (  ) alone  (  ) children   (  ) spouse   (  ) parents  (  ) other  Recent Travel: No recent travel  Occupation:    Substance Use (street drugs): ( x ) never used  (  ) other:  Tobacco Usage:  ( x  ) never smoked   (   ) former smoker   (   ) current smoker  (     ) pack year  Alcohol Usage: None Marital Status:  (  X )    (   ) Single    (   )    (  )   Lives with: (  ) alone  (  ) children   ( X ) spouse   (  ) parents  (  ) other  Recent Travel: No recent travel  Occupation: Retired     Substance Use (street drugs): ( x ) never used  (  ) other:  Tobacco Usage:  ( x  ) never smoked   (   ) former smoker   (   ) current smoker  (     ) pack year  Alcohol Usage: None    PT IS [facility]EHOVA'S WITNESS AND CANNOT RECEIVE BLOOD TRANSFUSIONS

## 2020-08-14 NOTE — PATIENT PROFILE ADULT - DOES PATIENT HAVE ADVANCE DIRECTIVE
Spiritual Plan of Care    Pt Name: Jose Winchester  Pt : 3/6/1928  Date: May 12, 2018    Volunteer elise Rawls visited pt.    No

## 2020-08-14 NOTE — ED PROVIDER NOTE - PHYSICAL EXAMINATION
CONSTITUTIONAL: Well-developed; well-nourished; in no acute distress.   SKIN: warm, dry  HEAD: Normocephalic.  EYES: PERRL, EOMI.  ENT: Airway clear.  NECK: Supple.  LYMPH: No acute cervical adenopathy.  CARD: No murmurs, rubs or gallops. Regular rate and rhythm.   RESP: No wheezing, rales or rhonchi.  ABD: soft ntnd  EXT: No clubbing, cyanosis. Minimal pedal edema.  NEURO: Alert, oriented.  PSYCH: Cooperative, appropriate.

## 2020-08-14 NOTE — CONSULT NOTE ADULT - SUBJECTIVE AND OBJECTIVE BOX
HPI:  A 69 yo F with PMHx of Sravan on Eliquis, CAD s/p stent 2002, HTN, DLD, DM, Hypothyroidism, GERD, asthma, OA, presents to ED c/o chest pain radiating to the back x 1 day. Pt reports very nonspecific symptoms but states she was having progressively worsening posterior neck pain x2 days, which she attributed to "sleeping the wrong way". Night prior to admission, pt experienced midsternal chest pressure a/w BP reading of 200/105 which prompted ED visit. Pressure was constant, radiated to the back, described as squeezing, 8/10, w/o accompanying symptoms. Pt took night dose of Losartan, but SBP reading was still in the 190s. Neck pain persisted at home, but resolved on its own since coming to ED.  Denies any fever, chills, cough, H/A, palpitations, sob, n/v/d/c, tingling, numbness, urinary symptoms. Has GERD but states she has not taken meds x 2 ms, because she didn't need it. No chest pressure during interview.    In the ED, CT angio chest did not show evidence of aortic dissection. EKG showed pt in NSR, first trop (-). VS: /65, HR 68, T 97.9, RR18, 99% in RA. CXR clear. COVID swab pending. (14 Aug 2020 08:15)    ----  2002 - bl rib tightness, preceding few days had perioral numbness --> cath / stent mid lad  2014 - cp - cath clean, deemed possible gerd  2017 - ua - cath showing mild diffuse instent restenosis mid lad  currently - past few days having retosternal / epigastric discomfort at rest. last night, intense cp radiating to back a/w arm/shoulder spasm/tightness and elevated bp 214/105 at home --> took losartan, bp lowered to 180 temporarily without pain relief --> came to ed.      In ed,  sbp initially 227 --> brought down to 130s with alleviation of pains (currently painfree). Trops <.01 --> .04 --> .03.  CT chest showing coronary alcifications, no dissection.  CXR neg. EKG with old findings of TWI v1-v3.       PAST MEDICAL & SURGICAL HISTORY  Atrial fibrillation  Arthritis  GERD (gastroesophageal reflux disease)  High cholesterol  Hypertension  Hypothyroid  Coronary heart disease  Diabetes  H/O right coronary artery stent placement      CARDIAC RISK FACTORS:  [ ]CAD  [ ]DM  [ ]DL  [ ]PVD  [ ]CVA  [ ]HTN  [ ]CHF     FAMILY HISTORY:  FAMILY HISTORY:  No pertinent family history in first degree relatives      SOCIAL HISTORY:  []smoker  []Alcohol  []Drug    ALLERGIES:  IV Contrast (Hives)      MEDICATIONS:  amLODIPine Oral Tab/Cap - Peds 2.5 milliGRAM(s) Oral two times a day  apixaban 5 milliGRAM(s) Oral every 12 hours  aspirin enteric coated 81 milliGRAM(s) Oral daily  atorvastatin 80 milliGRAM(s) Oral at bedtime  chlorhexidine 4% Liquid 1 Application(s) Topical <User Schedule>  dextrose 40% Gel 15 Gram(s) Oral once PRN  dextrose 5%. 1000 milliLiter(s) (50 mL/Hr) IV Continuous <Continuous>  dextrose 50% Injectable 12.5 Gram(s) IV Push once  dextrose 50% Injectable 25 Gram(s) IV Push once  dextrose 50% Injectable 25 Gram(s) IV Push once  glucagon  Injectable 1 milliGRAM(s) IntraMuscular once PRN  insulin glargine Injectable (LANTUS) 20 Unit(s) SubCutaneous at bedtime  insulin lispro (HumaLOG) corrective regimen sliding scale   SubCutaneous three times a day before meals  insulin lispro Injectable (HumaLOG) 7 Unit(s) SubCutaneous three times a day before meals  levothyroxine 88 MICROGram(s) Oral daily  losartan 100 milliGRAM(s) Oral daily  nitroglycerin     SubLingual 0.4 milliGRAM(s) SubLingual every 5 minutes PRN  pantoprazole    Tablet 40 milliGRAM(s) Oral before breakfast  sotalol 80 milliGRAM(s) Oral two times a day      HOME MEDICATIONS:  Home Medications:  amLODIPine 2.5 mg oral tablet: 1 tab(s) orally 2 times a day (14 Aug 2020 09:26)  apixaban 5 mg oral tablet: 1 tab(s) orally every 12 hours (26 Jul 2018 10:11)  Aspir 81 oral delayed release tablet: 1 tab(s) orally once a day (24 Jul 2018 03:42)  atorvastatin 80 mg oral tablet: 1 tab(s) orally once a day (24 Jul 2018 03:42)  levothyroxine 88 mcg (0.088 mg) oral tablet: 1 tab(s) orally once a day (24 Jul 2018 03:42)  losartan 100 mg oral tablet: 1 tab(s) orally once a day (14 Aug 2020 09:26)  metFORMIN 1000 mg oral tablet: 1 tab(s) orally 2 times a day (24 Jul 2018 03:42)  pantoprazole 40 mg oral delayed release tablet: 1 tab(s) orally once a day (24 Jul 2018 03:42)  sotalol 80 mg oral tablet: 1 tab(s) orally 2 times a day (24 Jul 2018 03:42)  Victoza: subcutaneous once a day (24 Jul 2018 03:42)      VITALS:   T(F): 97 (08-14 @ 20:48), Max: 97.9 (08-13 @ 23:55)  HR: 70 (08-14 @ 20:48) (67 - 80)  BP: 139/64 (08-14 @ 20:48) (134/65 - 227/91)  BP(mean): --  RR: 18 (08-14 @ 20:48) (16 - 18)  SpO2: 98% (08-14 @ 19:56) (98% - 99%)    I&O's Summary    14 Aug 2020 07:01  -  14 Aug 2020 22:21  --------------------------------------------------------  IN: 60 mL / OUT: 0 mL / NET: 60 mL        REVIEW OF SYSTEMS:  CONSTITUTIONAL: No weakness, fevers or chills  HEENT: No visual changes, neck/ear pain  RESPIRATORY: No cough, sob  CARDIOVASCULAR: No chest pain or palpitations  GASTROINTESTINAL: No abdominal pain. No nausea, vomiting, diarrhea   GENITOURINARY: No dysuria, frequency or hematuria  NEUROLOGICAL: No new focal deficits  SKIN: No new rashes    PHYSICAL EXAM:  General: Not in distress.  Non-toxic appearing.   HEENT: EOMI  Cardio: Regular rate and rhythm, S1, S2, no murmur  Pulm: B/L BS.  No wheezing / crackles  Abdomen: Soft, non-tender, non-distended. Normoactive bowel sounds  Extremities: No edema b/l   Neuro: A&O x3. No focal deficits    LABS:                        12.4   4.84  )-----------( 229      ( 14 Aug 2020 00:35 )             37.1     08-14    134<L>  |  99  |  18  ----------------------------<  120<H>  4.6   |  26  |  0.8    Ca    9.8      14 Aug 2020 00:35    TPro  7.1  /  Alb  4.4  /  TBili  2.3<H>  /  DBili  x   /  AST  18  /  ALT  15  /  AlkPhos  54  08-14      Troponin T, Serum: 0.03 ng/mL <HH> (08-14-20 @ 20:20)  Creatine Kinase, Serum: 65 U/L (08-14-20 @ 20:20)  Troponin T, Serum: 0.03 ng/mL <HH> (08-14-20 @ 17:22)  Troponin T, Serum: 0.04 ng/mL <HH> (08-14-20 @ 12:18)  Troponin T, Serum: <0.01 ng/mL (08-14-20 @ 00:35)    CARDIAC MARKERS ( 14 Aug 2020 20:20 )  x     / 0.03 ng/mL / 65 U/L / x     / 2.7 ng/mL  CARDIAC MARKERS ( 14 Aug 2020 17:22 )  x     / 0.03 ng/mL / x     / x     / x      CARDIAC MARKERS ( 14 Aug 2020 12:18 )  x     / 0.04 ng/mL / x     / x     / x      CARDIAC MARKERS ( 14 Aug 2020 00:35 )  x     / <0.01 ng/mL / x     / x     / x            Troponin trend:            RADIOLOGY:   from: CT Angio Chest Dissection Protocol (08.14.20 @ 03:29) >  HEART/GREAT VESSELS: No pericardial effusion. Heart size unremarkable. Coronary artery and thoracic aorta atherosclerotic calcifications. No aneurysmal dilation of the thoracic aorta.    < end of copied text >  < from: CT Angio Chest Dissection Protocol (08.14.20 @ 03:29) >  No CTA evidence of aortic dissection or intramural hematoma.    5.3 cm left adnexal cystic lesion. Recommend ultrasound prior to gadolinium enhanced MRI of the pelvis for further evaluation on an outpatient basis.    < end of copied text >    -TTE:  < from: Transthoracic Echocardiogram (08.14.20 @ 15:21) >   1. Normal global left ventricular systolic function.   2. LV Ejection Fraction by Stevenson's Method with a biplane EF of 66 %.   3. Normal left ventricular internal cavity size.   4. Spectral Doppler shows impaired relaxation pattern of left ventricular myocardial filling (Grade I diastolic dysfunction).   5. Structurally normal mitral valve, with normal leaflet excursion.   6. Normal trileaflet aortic valve with normal opening.   7. Estimated pulmonary artery systolic pressure is 35.3 mmHgassuming a right atrial pressure of 10 mmHg, which is consistent with borderline pulmonary hypertension.   8. LA volume Index is 29.6 ml/m² ml/m2.   9. There is mild aortic root calcification.    < end of copied text >    -CCTA:  -STRESS TEST:  -CATHETERIZATION:  2017:  FOR UA.  MID LAD MILD DIFFUSE IN STENT RESTENOSIS  2014: CLEAN. PATENT LAD STENT.     ECG:  NSR. TWI ANTEROSEPTAL V1-V3. UNCHANGED FROM 2018.       TELEMETRY EVENTS:

## 2020-08-14 NOTE — H&P ADULT - HISTORY OF PRESENT ILLNESS
A 69 yo F with PMHx of ___, presents to ED c/o chest pain radiating to the back x __ days. States ___  Endorses _______. Denies     In the ED, CT angio chest did not show evidence of aortic dissection. EKG showed pt in NSR, first trop (-). VS were ______. A 71 yo F with PMHx of Sravan on Eliquis, CAD s/p stent 2002, HTN, DLD, DM, Hypothyroidism, GERD, asthma, OA, presents to ED c/o chest pain radiating to the back x 1 day. Pt was having progressively worsening posterior neck pain x2 days, which she attributed to "sleeping the wrong way". Night prior to admission, pt experienced midsternal chest pressure a/w BP reading of 200/105 which prompted ED visit. Pressure was constant, radiated to the back, described as squeezing, 8/10, w/o accompanying symptoms. Pt took night dose of Losartan, but SBP reading was still in the 190s. Denies any fever, chills, cough, H/A, palpitations, sob, n/v/d/c, tingling, numbness, urinary symptoms. Pt was not experiencing any chest pressure during interview.    In the ED, CT angio chest did not show evidence of aortic dissection. EKG showed pt in NSR, first trop (-). VS: /65, HR68, T97.9, RR18, 99% in RA. A 71 yo F with PMHx of Sravan on Eliquis, CAD s/p stent 2002, HTN, DLD, DM, Hypothyroidism, GERD, asthma, OA, presents to ED c/o chest pain radiating to the back x 1 day. Pt was having progressively worsening posterior neck pain x2 days, which she attributed to "sleeping the wrong way". Night prior to admission, pt experienced midsternal chest pressure a/w BP reading of 200/105 which prompted ED visit. Pressure was constant, radiated to the back, described as squeezing, 8/10, w/o accompanying symptoms. Pt took night dose of Losartan, but SBP reading was still in the 190s. Denies any fever, chills, cough, H/A, palpitations, sob, n/v/d/c, tingling, numbness, urinary symptoms. Pt has GERD but states she has not taken meds x 2 ms, because she didn't need it. No chest pressure during interview.    In the ED, CT angio chest did not show evidence of aortic dissection. EKG showed pt in NSR, first trop (-). VS: /65, HR 68, T 97.9, RR18, 99% in RA. A 71 yo F with PMHx of Sravan on Eliquis, CAD s/p stent 2002, HTN, DLD, DM, Hypothyroidism, GERD, asthma, OA, presents to ED c/o chest pain radiating to the back x 1 day. Pt reports very nonspecific symptoms but states she was having progressively worsening posterior neck pain x2 days, which she attributed to "sleeping the wrong way". Night prior to admission, pt experienced midsternal chest pressure a/w BP reading of 200/105 which prompted ED visit. Pressure was constant, radiated to the back, described as squeezing, 8/10, w/o accompanying symptoms. Pt took night dose of Losartan, but SBP reading was still in the 190s. Neck pain persisted at home, but resolved on its own since coming to ED.  Denies any fever, chills, cough, H/A, palpitations, sob, n/v/d/c, tingling, numbness, urinary symptoms. Has GERD but states she has not taken meds x 2 ms, because she didn't need it. No chest pressure during interview.    In the ED, CT angio chest did not show evidence of aortic dissection. EKG showed pt in NSR, first trop (-). VS: /65, HR 68, T 97.9, RR18, 99% in RA. CXR clear. COVID not done in ED. A 69 yo F with PMHx of Sravan on Eliquis, CAD s/p stent 2002, HTN, DLD, DM, Hypothyroidism, GERD, asthma, OA, presents to ED c/o chest pain radiating to the back x 1 day. Pt reports very nonspecific symptoms but states she was having progressively worsening posterior neck pain x2 days, which she attributed to "sleeping the wrong way". Night prior to admission, pt experienced midsternal chest pressure a/w BP reading of 200/105 which prompted ED visit. Pressure was constant, radiated to the back, described as squeezing, 8/10, w/o accompanying symptoms. Pt took night dose of Losartan, but SBP reading was still in the 190s. Neck pain persisted at home, but resolved on its own since coming to ED.  Denies any fever, chills, cough, H/A, palpitations, sob, n/v/d/c, tingling, numbness, urinary symptoms. Has GERD but states she has not taken meds x 2 ms, because she didn't need it. No chest pressure during interview.    In the ED, CT angio chest did not show evidence of aortic dissection. EKG showed pt in NSR, first trop (-). VS: /65, HR 68, T 97.9, RR18, 99% in RA. CXR clear. COVID swab pending.

## 2020-08-15 ENCOUNTER — TRANSCRIPTION ENCOUNTER (OUTPATIENT)
Age: 70
End: 2020-08-15

## 2020-08-15 VITALS
HEART RATE: 71 BPM | DIASTOLIC BLOOD PRESSURE: 56 MMHG | RESPIRATION RATE: 18 BRPM | TEMPERATURE: 98 F | SYSTOLIC BLOOD PRESSURE: 116 MMHG

## 2020-08-15 LAB
A1C WITH ESTIMATED AVERAGE GLUCOSE RESULT: 6.7 % — HIGH (ref 4–5.6)
ANION GAP SERPL CALC-SCNC: 9 MMOL/L — SIGNIFICANT CHANGE UP (ref 7–14)
BASOPHILS # BLD AUTO: 0.02 K/UL — SIGNIFICANT CHANGE UP (ref 0–0.2)
BASOPHILS NFR BLD AUTO: 0.3 % — SIGNIFICANT CHANGE UP (ref 0–1)
BUN SERPL-MCNC: 21 MG/DL — HIGH (ref 10–20)
CALCIUM SERPL-MCNC: 9.2 MG/DL — SIGNIFICANT CHANGE UP (ref 8.5–10.1)
CHLORIDE SERPL-SCNC: 108 MMOL/L — SIGNIFICANT CHANGE UP (ref 98–110)
CO2 SERPL-SCNC: 28 MMOL/L — SIGNIFICANT CHANGE UP (ref 17–32)
CREAT SERPL-MCNC: 0.8 MG/DL — SIGNIFICANT CHANGE UP (ref 0.7–1.5)
EOSINOPHIL # BLD AUTO: 0.02 K/UL — SIGNIFICANT CHANGE UP (ref 0–0.7)
EOSINOPHIL NFR BLD AUTO: 0.3 % — SIGNIFICANT CHANGE UP (ref 0–8)
ESTIMATED AVERAGE GLUCOSE: 146 MG/DL — HIGH (ref 68–114)
GLUCOSE BLDC GLUCOMTR-MCNC: 101 MG/DL — HIGH (ref 70–99)
GLUCOSE BLDC GLUCOMTR-MCNC: 117 MG/DL — HIGH (ref 70–99)
GLUCOSE BLDC GLUCOMTR-MCNC: 149 MG/DL — HIGH (ref 70–99)
GLUCOSE SERPL-MCNC: 109 MG/DL — HIGH (ref 70–99)
HCT VFR BLD CALC: 34 % — LOW (ref 37–47)
HCV AB S/CO SERPL IA: 0.03 COI — SIGNIFICANT CHANGE UP
HCV AB SERPL-IMP: SIGNIFICANT CHANGE UP
HGB BLD-MCNC: 11.4 G/DL — LOW (ref 12–16)
IMM GRANULOCYTES NFR BLD AUTO: 0.3 % — SIGNIFICANT CHANGE UP (ref 0.1–0.3)
LYMPHOCYTES # BLD AUTO: 2.25 K/UL — SIGNIFICANT CHANGE UP (ref 1.2–3.4)
LYMPHOCYTES # BLD AUTO: 37.3 % — SIGNIFICANT CHANGE UP (ref 20.5–51.1)
MCHC RBC-ENTMCNC: 30.7 PG — SIGNIFICANT CHANGE UP (ref 27–31)
MCHC RBC-ENTMCNC: 33.5 G/DL — SIGNIFICANT CHANGE UP (ref 32–37)
MCV RBC AUTO: 91.6 FL — SIGNIFICANT CHANGE UP (ref 81–99)
MONOCYTES # BLD AUTO: 0.58 K/UL — SIGNIFICANT CHANGE UP (ref 0.1–0.6)
MONOCYTES NFR BLD AUTO: 9.6 % — HIGH (ref 1.7–9.3)
NEUTROPHILS # BLD AUTO: 3.14 K/UL — SIGNIFICANT CHANGE UP (ref 1.4–6.5)
NEUTROPHILS NFR BLD AUTO: 52.2 % — SIGNIFICANT CHANGE UP (ref 42.2–75.2)
NRBC # BLD: 0 /100 WBCS — SIGNIFICANT CHANGE UP (ref 0–0)
PLATELET # BLD AUTO: 222 K/UL — SIGNIFICANT CHANGE UP (ref 130–400)
POTASSIUM SERPL-MCNC: 4.1 MMOL/L — SIGNIFICANT CHANGE UP (ref 3.5–5)
POTASSIUM SERPL-SCNC: 4.1 MMOL/L — SIGNIFICANT CHANGE UP (ref 3.5–5)
RBC # BLD: 3.71 M/UL — LOW (ref 4.2–5.4)
RBC # FLD: 13.7 % — SIGNIFICANT CHANGE UP (ref 11.5–14.5)
SODIUM SERPL-SCNC: 145 MMOL/L — SIGNIFICANT CHANGE UP (ref 135–146)
WBC # BLD: 6.03 K/UL — SIGNIFICANT CHANGE UP (ref 4.8–10.8)
WBC # FLD AUTO: 6.03 K/UL — SIGNIFICANT CHANGE UP (ref 4.8–10.8)

## 2020-08-15 PROCEDURE — 99222 1ST HOSP IP/OBS MODERATE 55: CPT

## 2020-08-15 RX ORDER — AMLODIPINE BESYLATE 2.5 MG/1
10 TABLET ORAL DAILY
Refills: 0 | Status: DISCONTINUED | OUTPATIENT
Start: 2020-08-16 | End: 2020-08-15

## 2020-08-15 RX ORDER — AMLODIPINE BESYLATE 2.5 MG/1
5 TABLET ORAL ONCE
Refills: 0 | Status: COMPLETED | OUTPATIENT
Start: 2020-08-15 | End: 2020-08-15

## 2020-08-15 RX ADMIN — APIXABAN 5 MILLIGRAM(S): 2.5 TABLET, FILM COATED ORAL at 06:02

## 2020-08-15 RX ADMIN — AMLODIPINE BESYLATE 2.5 MILLIGRAM(S): 2.5 TABLET ORAL at 06:02

## 2020-08-15 RX ADMIN — Medication 80 MILLIGRAM(S): at 06:02

## 2020-08-15 RX ADMIN — LOSARTAN POTASSIUM 100 MILLIGRAM(S): 100 TABLET, FILM COATED ORAL at 06:02

## 2020-08-15 RX ADMIN — AMLODIPINE BESYLATE 5 MILLIGRAM(S): 2.5 TABLET ORAL at 10:08

## 2020-08-15 RX ADMIN — APIXABAN 5 MILLIGRAM(S): 2.5 TABLET, FILM COATED ORAL at 17:06

## 2020-08-15 RX ADMIN — Medication 7 UNIT(S): at 11:41

## 2020-08-15 RX ADMIN — Medication 81 MILLIGRAM(S): at 11:13

## 2020-08-15 RX ADMIN — PANTOPRAZOLE SODIUM 40 MILLIGRAM(S): 20 TABLET, DELAYED RELEASE ORAL at 06:02

## 2020-08-15 RX ADMIN — Medication 88 MICROGRAM(S): at 06:02

## 2020-08-15 RX ADMIN — Medication 80 MILLIGRAM(S): at 17:06

## 2020-08-15 NOTE — DISCHARGE NOTE PROVIDER - NSDCCPCAREPLAN_GEN_ALL_CORE_FT
PRINCIPAL DISCHARGE DIAGNOSIS  Diagnosis: Chest pain  Assessment and Plan of Treatment: Chest Pain  Chest pain can be caused by many different conditions which may or may not be dangerous. Causes include heartburn, lung infections, heart attack, blood clot in lungs, skin infections, strain or damage to muscle, cartilage, or bones, etc. In addition to a history and physical examination, an electrocardiogram (ECG) or other lab tests may have been performed to determine the cause of your chest pain. Follow up with your primary care provider or with a cardiologist as instructed.   SEEK IMMEDIATE MEDICAL CARE IF YOU HAVE ANY OF THE FOLLOWING SYMPTOMS: worsening chest pain, coughing up blood, unexplained back/neck/jaw pain, severe abdominal pain, dizziness or lightheadedness, fainting, shortness of breath, sweaty or clammy skin, vomiting, or racing heart beat. These symptoms may represent a serious problem that is an emergency. Do not wait to see if the symptoms will go away. Get medical help right away. Call 911 and do not drive yourself to the hospital.        SECONDARY DISCHARGE DIAGNOSES  Diagnosis: Hypertension  Assessment and Plan of Treatment: Hypertension  Hypertension, commonly called high blood pressure, is when the force of blood pumping through your arteries is too strong. Hypertension forces your heart to work harder to pump blood. Your arteries may become narrow or stiff. Having untreated or uncontrolled hypertension for a long period of time can cause heart attack, stroke, kidney disease, and other problems. If started on a medication, take exactly as prescribed by your health care professional. Maintain a healthy lifestyle and follow up with your primary care physician.  SEEK IMMEDIATE MEDICAL CARE IF YOU HAVE ANY OF THE FOLLOWING SYMPTOMS: severe headache, confusion, chest pain, abdominal pain, vomiting, or shortness of breath. PRINCIPAL DISCHARGE DIAGNOSIS  Diagnosis: Chest pain  Assessment and Plan of Treatment: You are scheduled for stress echo next week, please follow up with your cardiologist.  Chest Pain  Chest pain can be caused by many different conditions which may or may not be dangerous. Causes include heartburn, lung infections, heart attack, blood clot in lungs, skin infections, strain or damage to muscle, cartilage, or bones, etc. In addition to a history and physical examination, an electrocardiogram (ECG) or other lab tests may have been performed to determine the cause of your chest pain. Follow up with your primary care provider or with a cardiologist as instructed.   SEEK IMMEDIATE MEDICAL CARE IF YOU HAVE ANY OF THE FOLLOWING SYMPTOMS: worsening chest pain, coughing up blood, unexplained back/neck/jaw pain, severe abdominal pain, dizziness or lightheadedness, fainting, shortness of breath, sweaty or clammy skin, vomiting, or racing heart beat. These symptoms may represent a serious problem that is an emergency. Do not wait to see if the symptoms will go away. Get medical help right away. Call 911 and do not drive yourself to the hospital.        SECONDARY DISCHARGE DIAGNOSES  Diagnosis: Adnexal cyst  Assessment and Plan of Treatment: You had an incidental finding of an ovarian cyst measuring 5.3cm. Please follow up with your GYNECOLOGIST for further workup.    Diagnosis: Hypertension  Assessment and Plan of Treatment: Continue with all medications. Please follow up with your primary care doctor and cardiologist  Hypertension  Hypertension, commonly called high blood pressure, is when the force of blood pumping through your arteries is too strong. Hypertension forces your heart to work harder to pump blood. Your arteries may become narrow or stiff. Having untreated or uncontrolled hypertension for a long period of time can cause heart attack, stroke, kidney disease, and other problems. If started on a medication, take exactly as prescribed by your health care professional. Maintain a healthy lifestyle and follow up with your primary care physician.  SEEK IMMEDIATE MEDICAL CARE IF YOU HAVE ANY OF THE FOLLOWING SYMPTOMS: severe headache, confusion, chest pain, abdominal pain, vomiting, or shortness of breath.

## 2020-08-15 NOTE — DISCHARGE NOTE PROVIDER - CARE PROVIDERS DIRECT ADDRESSES
,DirectAddress_Unknown ,DirectAddress_Unknown,DirectAddress_Unknown,[email or web address]

## 2020-08-15 NOTE — PROGRESS NOTE ADULT - SUBJECTIVE AND OBJECTIVE BOX
DERRICK DORMAN 71yo  Female from home came to the ER for c/o midsternal CP, pressure like, 8/10 radiating to the back assoc with elevate BP.  The pt states that she had posterior neck pain prior and thought it was due to "  sleeping wrong"  but since the CP did not subside and her BP readings were  high 190-200 sys she decided to come to the ER. The pt denies SOB, Palp, diaphoresis or N or V.  The pt hat CT angio of the chest which showed NO evidence of dissection, the EKG is normal, the trop is 0.03..  The pt is admitted to [facility] for further cardiac w/up and observation.  The PMHx includes:  HTN, ASHD, CAD, sp stent in 202, afib on Eliquis, previous ECHO showed EF 67%, mod PFO with L to R shunt, DLD, DM II, Hypothyroidism,  GERD, Diverticulosis, BMI 37, OA, DDD, DJD, Br Asthma.  The pt is a Jehova's Witness/no blood or blood products.    INTERVAL HPI/OVERNIGHT EVENTS: pt on tele, feels better    MEDICATIONS  (STANDING):  amLODIPine Oral Tab/Cap - Peds 2.5 milliGRAM(s) Oral two times a day  apixaban 5 milliGRAM(s) Oral every 12 hours  aspirin enteric coated 81 milliGRAM(s) Oral daily  atorvastatin 80 milliGRAM(s) Oral at bedtime  chlorhexidine 4% Liquid 1 Application(s) Topical <User Schedule>  dextrose 5%. 1000 milliLiter(s) (50 mL/Hr) IV Continuous <Continuous>  dextrose 50% Injectable 12.5 Gram(s) IV Push once  dextrose 50% Injectable 25 Gram(s) IV Push once  dextrose 50% Injectable 25 Gram(s) IV Push once  insulin glargine Injectable (LANTUS) 20 Unit(s) SubCutaneous at bedtime  insulin lispro (HumaLOG) corrective regimen sliding scale   SubCutaneous three times a day before meals  insulin lispro Injectable (HumaLOG) 7 Unit(s) SubCutaneous three times a day before meals  levothyroxine 88 MICROGram(s) Oral daily  losartan 100 milliGRAM(s) Oral daily  pantoprazole    Tablet 40 milliGRAM(s) Oral before breakfast  sotalol 80 milliGRAM(s) Oral two times a day    MEDICATIONS  (PRN):  dextrose 40% Gel 15 Gram(s) Oral once PRN Blood Glucose LESS THAN 70 milliGRAM(s)/deciliter  glucagon  Injectable 1 milliGRAM(s) IntraMuscular once PRN Glucose LESS THAN 70 milligrams/deciliter  nitroglycerin     SubLingual 0.4 milliGRAM(s) SubLingual every 5 minutes PRN Chest Pain      Allergies    IV Contrast (Hives)    Intolerances        Vital Signs Last 24 Hrs  Wt:  87kg  BMI:  37  T(F): 97.7  HR: 71  BP: 116/56    RR: 18   SpO2: 98%    PHYSICAL EXAM:      Constitutional:  A&O, overweight F in NAD    Eyes:  non icteric    ENMT: dry oral mucosa no lesions    Neck: supple, no JVD, no bruits, + posterior and trapezius mm spasm and tenderness    Respiratory: shallow resp, no wheezing, rhonchi or crackles or rales    Cardiovascular:  S1S2 reg    Gastrointestinal:  globular soft and benign, + BS    Genitourinary:  no parisi    Extremities:  moves all ext, + arthritic changes    Vascular: + pedal pulses    Neurological:  nonfocal    Skin:  no rash        LABS:                        11.4   6  )-----------( 222             34       145  |  108  |  21  ----------------------------<  109  4.1   |  28  |  0.8  GFR 75  Ca 9.8  A1C6.7  COVID neg  trop 0.04, 0.03, 0.03  CK 6.5, MB 2.7    TPro  7.1  /  Alb  4.4  /  TBili  2.3<H>  /  DBili  x   /  AST  18  /  ALT  15  /  AlkPhos  54  08-14          RADIOLOGY & ADDITIONAL TESTS:  CXR:  no infiltrats  EKG:  NSR 64/min, no acute changes  CT angio of chest and abd:  no evidence of dissection, heart not enlarged, no pericardial fluid, scattered soft and calcified plaques in the aorta, calcifications of the coronaries, no sig abd cjanges, L 5.3cm adnexal cystic lesion

## 2020-08-15 NOTE — DISCHARGE NOTE PROVIDER - CARE PROVIDER_API CALL
Miguelina Hawkins  [id number]  [address] [place] [address], [facility] [address]  [place] [address]  Phone: [phone]  Fax: [phone]  Follow Up Time: 2 weeks Miguelina Hawkins  [id number]  [address] [facility], [address]  [place] [address]  Phone: [phone]  Fax: [phone]  Follow Up Time: 2 weeks    JUANITA SCHUSTER  [id number]  [address] [place] [address]  Phone: ([phone]  Fax: ()-  Follow Up Time: 2 weeks    Aditya Edouard)  Cardiovascular Disease; Internal Medicine  [address] [place] [address]  Phone: [phone]  Fax: [phone]  Follow Up Time: 1 week

## 2020-08-15 NOTE — DISCHARGE NOTE NURSING/CASE MANAGEMENT/SOCIAL WORK - PATIENT PORTAL LINK FT
You can access the FollowMyHealth Patient Portal offered by [facility] by registering at the following website: [email or web address]. By joining [employer]’s FollowMyHealth portal, you will also be able to view your health information using other applications (apps) compatible with our system.

## 2020-08-15 NOTE — DISCHARGE NOTE PROVIDER - NSDCFUSCHEDAPPT_GEN_ALL_CORE_FT
DERRICK DORMAN ; 08/25/2020 ; [facility] [address] DERRICK Velasco ; 08/25/2020 ; [facility] [address] DERRICK DORMAN ; 08/25/2020 ; [facility] [address] DERRICK Velasco ; 08/25/2020 ; [facility] [address] DERRICK DORMAN ; 08/25/2020 ; [facility] [address] DERRICK Velasco ; 08/25/2020 ; [facility] [address]

## 2020-08-15 NOTE — DISCHARGE NOTE PROVIDER - NSDCMRMEDTOKEN_GEN_ALL_CORE_FT
amLODIPine 2.5 mg oral tablet: 1 tab(s) orally 2 times a day  apixaban 5 mg oral tablet: 1 tab(s) orally every 12 hours  Aspir 81 oral delayed release tablet: 1 tab(s) orally once a day  atorvastatin 80 mg oral tablet: 1 tab(s) orally once a day  levothyroxine 88 mcg (0.088 mg) oral tablet: 1 tab(s) orally once a day  losartan 100 mg oral tablet: 1 tab(s) orally once a day  metFORMIN 1000 mg oral tablet: 1 tab(s) orally 2 times a day  pantoprazole 40 mg oral delayed release tablet: 1 tab(s) orally once a day  sotalol 80 mg oral tablet: 1 tab(s) orally 2 times a day  Victoza: subcutaneous once a day

## 2020-08-15 NOTE — PROGRESS NOTE ADULT - SUBJECTIVE AND OBJECTIVE BOX
HPI  Patient is a 70y old Female who presents with a chief complaint of Chest pain    Currently admitted to medicine with the primary diagnosis of Chest pain     Today is hospital day 1d.     INTERVAL HPI / OVERNIGHT EVENTS:  Patient was examined and seen at bedside. This morning she is resting comfortably in bed and reports no new issues or overnight events. Denies any chest pain, palpitations, headaches.    ROS: Otherwise unremarkable     PAST MEDICAL & SURGICAL HISTORY  Atrial fibrillation  Arthritis  GERD (gastroesophageal reflux disease)  High cholesterol  Hypertension  Hypothyroid  Coronary heart disease  Diabetes  H/O right coronary artery stent placement    ALLERGIES  IV Contrast (Hives)    MEDICATIONS  STANDING MEDICATIONS  apixaban 5 milliGRAM(s) Oral every 12 hours  aspirin enteric coated 81 milliGRAM(s) Oral daily  atorvastatin 80 milliGRAM(s) Oral at bedtime  chlorhexidine 4% Liquid 1 Application(s) Topical <User Schedule>  dextrose 5%. 1000 milliLiter(s) IV Continuous <Continuous>  dextrose 50% Injectable 12.5 Gram(s) IV Push once  dextrose 50% Injectable 25 Gram(s) IV Push once  dextrose 50% Injectable 25 Gram(s) IV Push once  insulin glargine Injectable (LANTUS) 20 Unit(s) SubCutaneous at bedtime  insulin lispro (HumaLOG) corrective regimen sliding scale   SubCutaneous three times a day before meals  insulin lispro Injectable (HumaLOG) 7 Unit(s) SubCutaneous three times a day before meals  levothyroxine 88 MICROGram(s) Oral daily  losartan 100 milliGRAM(s) Oral daily  pantoprazole    Tablet 40 milliGRAM(s) Oral before breakfast  sotalol 80 milliGRAM(s) Oral two times a day    PRN MEDICATIONS  dextrose 40% Gel 15 Gram(s) Oral once PRN  glucagon  Injectable 1 milliGRAM(s) IntraMuscular once PRN  nitroglycerin     SubLingual 0.4 milliGRAM(s) SubLingual every 5 minutes PRN    VITALS:  T(F): 97.7  HR: 71  BP: 116/56  RR: 18  SpO2: 98%    PHYSICAL EXAM  GEN: NAD, Resting comfortably in bed  PULM: Clear to auscultation bilaterally, No wheezes  CVS: Regular rate and rhythm, S1-S2, no murmurs  ABD: Soft, non-tender, non-distended, no guarding  EXT: No edema  NEURO: A&Ox3, no focal deficits    LABS                        11.4   6.03  )-----------( 222      ( 15 Aug 2020 07:17 )             34.0     08-15    145  |  108  |  21<H>  ----------------------------<  109<H>  4.1   |  28  |  0.8    Ca    9.2      15 Aug 2020 07:17    TPro  7.1  /  Alb  4.4  /  TBili  2.3<H>  /  DBili  x   /  AST  18  /  ALT  15  /  AlkPhos  54  08-14          Troponin T, Serum: 0.03 ng/mL <HH> (08-14-20 @ 20:20)  Creatine Kinase, Serum: 65 U/L (08-14-20 @ 20:20)  Troponin T, Serum: 0.03 ng/mL <HH> (08-14-20 @ 17:22)      CARDIAC MARKERS ( 14 Aug 2020 20:20 )  x     / 0.03 ng/mL / 65 U/L / x     / 2.7 ng/mL  CARDIAC MARKERS ( 14 Aug 2020 17:22 )  x     / 0.03 ng/mL / x     / x     / x      CARDIAC MARKERS ( 14 Aug 2020 12:18 )  x     / 0.04 ng/mL / x     / x     / x      CARDIAC MARKERS ( 14 Aug 2020 00:35 )  x     / <0.01 ng/mL / x     / x     / x          RADIOLOGY

## 2020-08-15 NOTE — PROGRESS NOTE ADULT - ASSESSMENT
A 71 yo F with PMHx of Sravan on Eliquis, CAD s/p stent 2002, HTN, DLD, DM, Hypothyroidism, GERD, asthma, OA, presents to ED c/o chest pain radiating to the back x 1 day.     PT IS JEHOVA'S WITNESS AND CANNOT RECEIVE BLOOD TRANSFUSIONS    # C/p r/o ACS  - Atypical chest pain: substernal, not provoked by exertion  - Unlikely anginal, possibly due to GERD   - Aortic dissection and PE ruled out via CT angio chest   - Not in Afib with RVR. EKG showed NSR, HR 64, no ST elevations or depressions  - First trop (-)  - Echo 7/2018: EF 67%, GIDD, mod sized PFO with L -> R shunting across atrium   - C/w ASA and Nitro for c/p  - F/u trop 11:00 and 16:00  - F/u repeat echo  - F/u COVID swab   - F/u Cardio c/s Dr. Edouard  - Admit to tele    # Sravan on Eliquis  - EKG showed NSR, HR 60s  - C/w Eliquis and Sotalol     # CAD s/p stent 2002  - C/w ASA    # HTN  - /65  - C/w Amlodipine 2.5mg PO BID and Losartan 100mg PO QD    # DLD  - C/w Lipitor 80mg PO QD     # DM  -   - Lantus/Lispro 20/7/7/7  - Monitor FS    # Hypothyroidism  - C/w Levo 112 cmg PO QD    # GERD  - Protonix 40mg PO QD     # Asthma  - Controlled off meds. No wheezing    # OA  - Off meds. Denies pain A 71 yo F with PMHx of A.fib on Eliquis, CAD s/p stent 2002, HTN, DLD, DM, Hypothyroidism, GERD, asthma, OA, presents to ED c/o chest pain radiating to the back x 1 day.     PT IS JEHOVA'S WITNESS AND CANNOT RECEIVE BLOOD TRANSFUSIONS    #Chest pain r/o ACS vs A.fib episode vs GERD  - Atypical chest pain: substernal, not provoked by exertion  - Unlikely anginal, possibly due to GERD   - Aortic dissection and PE ruled out via CT angio chest   - Not in Afib with RVR. EKG showed NSR, HR 64, no ST elevations or depressions, old T wave inversions  - First trop (-), subsequent trops 0.04 > 0.03 > 0.03  - Echo 7/2018: EF 67%, GIDD, mod sized PFO with L -> R shunting across atrium   - C/w ASA and Nitro for c/p  - ECHO: EF 66, GIDD  - Cardio: f/u outpatient for stress echo     # A.fib on Eliquis  - EKG showed NSR, HR 60s  - C/w Eliquis and Sotalol     # CAD s/p stent 2002  - C/w ASA    # HTN  - /68  - given amlodipine 5mg + 2.5mg this morning  - C/w Amlodipine 2.5mg PO BID and Losartan 100mg PO QD    # DLD  - C/w Lipitor 80mg PO QD     # DM  -   - Lantus/Lispro 20/7/7/7  - Monitor FS    # Hypothyroidism  - C/w Levo 112 cmg PO QD    # GERD  - Protonix 40mg PO QD     # Asthma  - Controlled off meds. No wheezing    # OA  - Off meds. Denies pain

## 2020-08-15 NOTE — DISCHARGE NOTE PROVIDER - PROVIDER TOKENS
PROVIDER:[TOKEN:[[id number]],FOLLOWUP:[2 weeks]] PROVIDER:[TOKEN:[[id number]],FOLLOWUP:[2 weeks]],PROVIDER:[TOKEN:[[id number]],FOLLOWUP:[2 weeks]],PROVIDER:[TOKEN:[[id number]],FOLLOWUP:[1 week]]

## 2020-08-15 NOTE — DISCHARGE NOTE PROVIDER - HOSPITAL COURSE
A 69 yo F with PMHx of Sravan on Eliquis, CAD s/p stent 2002, HTN, DLD, DM, Hypothyroidism, GERD, asthma, OA, presents to ED c/o chest pain radiating to the back x 1 day. Pt reports very nonspecific symptoms but states she was having progressively worsening posterior neck pain x2 days, which she attributed to "sleeping the wrong way". Night prior to admission, pt experienced midsternal chest pressure a/w BP reading of 200/105 which prompted ED visit. Pressure was constant, radiated to the back, described as squeezing, 8/10, w/o accompanying symptoms. Pt took night dose of Losartan, but SBP reading was still in the 190s. Neck pain persisted at home, but resolved on its own since coming to ED.    Denies any fever, chills, cough, H/A, palpitations, sob, n/v/d/c, tingling, numbness, urinary symptoms. Has GERD but states she has not taken meds x 2 ms, because she didn't need it. No chest pressure during interview.        In the ED, CT angio chest did not show evidence of aortic dissection. EKG showed pt in NSR, first trop (-). VS: /65, HR 68, T 97.9, RR18, 99% in RA. CXR clear. COVID swab pending. A 69 yo F with PMHx of Sravan on Eliquis, CAD s/p stent 2002, HTN, DLD, DM, Hypothyroidism, GERD, asthma, OA, presents to ED c/o chest pain radiating to the back x 1 day. Pt reports very nonspecific symptoms but states she was having progressively worsening posterior neck pain x2 days, which she attributed to "sleeping the wrong way". Night prior to admission, pt experienced midsternal chest pressure a/w BP reading of 200/105 which prompted ED visit. Pressure was constant, radiated to the back, described as squeezing, 8/10, w/o accompanying symptoms. Pt took night dose of Losartan, but SBP reading was still in the 190s. Neck pain persisted at home, but resolved on its own since coming to ED.    Denies any fever, chills, cough, H/A, palpitations, sob, n/v/d/c, tingling, numbness, urinary symptoms. Has GERD but states she has not taken meds x 2 ms, because she didn't need it. No chest pressure during interview.        In the ED, CT angio chest did not show evidence of aortic dissection. EKG showed pt in NSR, first trop (-). VS: /65, HR 68, T 97.9, RR18, 99% in RA. CXR clear. A 71 yo F with PMHx of Sravan on Eliquis, CAD s/p stent 2002, HTN, DLD, DM, Hypothyroidism, GERD, asthma, OA, presents to ED c/o chest pain radiating to the back x 1 day. Pt reported nonspecific symptoms but states she was having progressively worsening posterior neck pain x2 days, which she attributed to "sleeping the wrong way". Night prior to admission, pt experienced midsternal chest pressure a/w BP reading of 200/105 which prompted ED visit. Pressure was constant, radiated to the back, described as squeezing, 8/10, w/o accompanying symptoms. Pt took night dose of Losartan, but SBP reading was still in the 190s. Neck pain persisted at home, but resolved on its own since coming to ED.    Denies any fever, chills, cough, H/A, palpitations, sob, n/v/d/c, tingling, numbness, urinary symptoms. Has GERD but states she has not taken meds x 2 ms, because she didn't need it. No chest pressure upon examination in the ED.        In the ED, CT angio chest did not show evidence of aortic dissection or PE. EKG showed pt in NSR. VS: /65, HR 68, T 97.9, RR18, 99% in RA. CXR clear. s/p Nitro and ASA in ED    Trops initally negative, 0.04 on repeat and stable at 0.03.    ECHO: stable EF 66% with GIDD    Patient is scheduled for outpatient stress echo with Dr. Edouard, she will be followed up as outpatient    Patient is advised to take all her medications as indicated to control her BP.    Patient has incidental finding of 5.3 L adenxal cyst, follow up with GYN as outpatient

## 2020-08-15 NOTE — PROGRESS NOTE ADULT - ASSESSMENT
Atypical Chest Pain, Dissection R/O by CT angio, ACS R/O  Hx of HTN, ASHD, CAD, sp stent 2002, PFO, afib ( Eliquis/AC)  Hx of DLD  Hx of DM II  Hx of Br Asthma  Hx of obesity, BMI 37  Hx of OA, DDD, DJD  Hx of GERD, Diverticulosis  L adnexal cystic mass  Pt is a Jehova's Witness    pt ad to tele for further cardiac w/up and observation  CT angio of chest showed no dissection, + solft and calcified plaques of aorta ant TH vessels, coronary calcifications, incidentaly noted 5.3 cm L adnexal cyst  cardio consult:  scheduled for out pt stress test  ECHO  CE  cont all home meds  Tx of GERD, GI prophylaxis   social scv and D/C  planning, pt medically stable, no further in pt cardiac w/up, may be D/C

## 2020-08-25 ENCOUNTER — APPOINTMENT (OUTPATIENT)
Dept: CARDIOLOGY | Facility: CLINIC | Age: 70
End: 2020-08-25
Payer: MEDICARE

## 2020-08-25 PROCEDURE — 93325 DOPPLER ECHO COLOR FLOW MAPG: CPT

## 2020-08-25 PROCEDURE — 93351 STRESS TTE COMPLETE: CPT

## 2020-08-25 PROCEDURE — 93320 DOPPLER ECHO COMPLETE: CPT

## 2020-08-27 DIAGNOSIS — R07.89 OTHER CHEST PAIN: ICD-10-CM

## 2020-08-27 DIAGNOSIS — Z95.5 PRESENCE OF CORONARY ANGIOPLASTY IMPLANT AND GRAFT: ICD-10-CM

## 2020-08-27 DIAGNOSIS — K57.90 DIVERTICULOSIS OF INTESTINE, PART UNSPECIFIED, WITHOUT PERFORATION OR ABSCESS WITHOUT BLEEDING: ICD-10-CM

## 2020-08-27 DIAGNOSIS — J45.909 UNSPECIFIED ASTHMA, UNCOMPLICATED: ICD-10-CM

## 2020-08-27 DIAGNOSIS — Z91.041 RADIOGRAPHIC DYE ALLERGY STATUS: ICD-10-CM

## 2020-08-27 DIAGNOSIS — M19.90 UNSPECIFIED OSTEOARTHRITIS, UNSPECIFIED SITE: ICD-10-CM

## 2020-08-27 DIAGNOSIS — Z91.14 PATIENT'S OTHER NONCOMPLIANCE WITH MEDICATION REGIMEN: ICD-10-CM

## 2020-08-27 DIAGNOSIS — I25.10 ATHEROSCLEROTIC HEART DISEASE OF NATIVE CORONARY ARTERY WITHOUT ANGINA PECTORIS: ICD-10-CM

## 2020-08-27 DIAGNOSIS — E78.5 HYPERLIPIDEMIA, UNSPECIFIED: ICD-10-CM

## 2020-08-27 DIAGNOSIS — E03.9 HYPOTHYROIDISM, UNSPECIFIED: ICD-10-CM

## 2020-08-27 DIAGNOSIS — Z79.82 LONG TERM (CURRENT) USE OF ASPIRIN: ICD-10-CM

## 2020-08-27 DIAGNOSIS — I10 ESSENTIAL (PRIMARY) HYPERTENSION: ICD-10-CM

## 2020-08-27 DIAGNOSIS — I48.91 UNSPECIFIED ATRIAL FIBRILLATION: ICD-10-CM

## 2020-08-27 DIAGNOSIS — E66.9 OBESITY, UNSPECIFIED: ICD-10-CM

## 2020-08-27 DIAGNOSIS — Z79.01 LONG TERM (CURRENT) USE OF ANTICOAGULANTS: ICD-10-CM

## 2020-08-27 DIAGNOSIS — I16.1 HYPERTENSIVE EMERGENCY: ICD-10-CM

## 2020-08-27 DIAGNOSIS — Z87.74 PERSONAL HISTORY OF (CORRECTED) CONGENITAL MALFORMATIONS OF HEART AND CIRCULATORY SYSTEM: ICD-10-CM

## 2020-08-27 DIAGNOSIS — Z79.84 LONG TERM (CURRENT) USE OF ORAL HYPOGLYCEMIC DRUGS: ICD-10-CM

## 2020-08-27 DIAGNOSIS — Z82.49 FAMILY HISTORY OF ISCHEMIC HEART DISEASE AND OTHER DISEASES OF THE CIRCULATORY SYSTEM: ICD-10-CM

## 2020-08-27 DIAGNOSIS — K21.9 GASTRO-ESOPHAGEAL REFLUX DISEASE WITHOUT ESOPHAGITIS: ICD-10-CM

## 2020-08-27 DIAGNOSIS — E11.9 TYPE 2 DIABETES MELLITUS WITHOUT COMPLICATIONS: ICD-10-CM

## 2020-08-27 DIAGNOSIS — R19.09 OTHER INTRA-ABDOMINAL AND PELVIC SWELLING, MASS AND LUMP: ICD-10-CM

## 2020-08-27 DIAGNOSIS — R07.9 CHEST PAIN, UNSPECIFIED: ICD-10-CM

## 2020-10-06 ENCOUNTER — RX RENEWAL (OUTPATIENT)
Age: 70
End: 2020-10-06

## 2020-10-15 PROBLEM — Z78.9 NO HISTORY OF ALCOHOL USE: Status: ACTIVE | Noted: 2020-10-15

## 2020-10-15 NOTE — OB HISTORY
[Total Preg ___] : : [unfilled] [Full Term ___] : [unfilled] (full-term) [Vaginal ___] : [unfilled] vaginal delivery(s) [Living ___] : [unfilled] (living) [Menarche Age ____] : age at menarche was [unfilled] [Menopause  Age ____] : menopause occurred at age [unfilled]

## 2020-10-15 NOTE — PAST MEDICAL HISTORY
[Menarche Age ____] : age at menarche was [unfilled] [Menopause Age____] : age at menopause was [unfilled] [Approximately ___] : the LMP was approximately [unfilled] [Total Preg ___] : G[unfilled] [Live Births ___] : P[unfilled]  [Full Term ___] : Full Term: [unfilled] [Living ___] : Living: [unfilled]

## 2020-10-16 ENCOUNTER — APPOINTMENT (OUTPATIENT)
Dept: GYNECOLOGIC ONCOLOGY | Facility: CLINIC | Age: 70
End: 2020-10-16
Payer: MEDICARE

## 2020-10-16 VITALS
BODY MASS INDEX: 36.06 KG/M2 | TEMPERATURE: 98.4 F | SYSTOLIC BLOOD PRESSURE: 136 MMHG | WEIGHT: 191 LBS | DIASTOLIC BLOOD PRESSURE: 80 MMHG | HEIGHT: 61 IN

## 2020-10-16 DIAGNOSIS — Z78.9 OTHER SPECIFIED HEALTH STATUS: ICD-10-CM

## 2020-10-16 PROCEDURE — 99204 OFFICE O/P NEW MOD 45 MIN: CPT

## 2020-10-16 NOTE — HISTORY OF PRESENT ILLNESS
[FreeTextEntry1] : 70 year old female referred by Dr. Shakeel Ham.   Patient had episode of chest pain in August for which a CTA was performed.   An incidental finding revealed a left adnexal mass.   A pelvic ultrasound was done, as well as a dedicated CT of Abdomen and Pelvis.  There is a 8.3 x 8.8 x 7.4 cm left adnexal mass with solid components.  The cystic  component of the mass likely represents a dilated fallopian tube with foci of neoplasm   CA-125 is 15.   The patient underwent coronary artery stenting in 2002 and has history of A-Fib.   She is taking Eloquis 5mg. qd.

## 2020-11-09 ENCOUNTER — OUTPATIENT (OUTPATIENT)
Dept: OUTPATIENT SERVICES | Facility: HOSPITAL | Age: 70
LOS: 1 days | Discharge: HOME | End: 2020-11-09
Payer: MEDICARE

## 2020-11-09 VITALS
SYSTOLIC BLOOD PRESSURE: 134 MMHG | RESPIRATION RATE: 15 BRPM | WEIGHT: 190.7 LBS | OXYGEN SATURATION: 99 % | DIASTOLIC BLOOD PRESSURE: 76 MMHG | HEART RATE: 67 BPM | TEMPERATURE: 99 F | HEIGHT: 61 IN

## 2020-11-09 DIAGNOSIS — Z90.49 ACQUIRED ABSENCE OF OTHER SPECIFIED PARTS OF DIGESTIVE TRACT: Chronic | ICD-10-CM

## 2020-11-09 DIAGNOSIS — N94.89 OTHER SPECIFIED CONDITIONS ASSOCIATED WITH FEMALE GENITAL ORGANS AND MENSTRUAL CYCLE: ICD-10-CM

## 2020-11-09 DIAGNOSIS — Z01.818 ENCOUNTER FOR OTHER PREPROCEDURAL EXAMINATION: ICD-10-CM

## 2020-11-09 DIAGNOSIS — Z95.5 PRESENCE OF CORONARY ANGIOPLASTY IMPLANT AND GRAFT: Chronic | ICD-10-CM

## 2020-11-09 LAB
A1C WITH ESTIMATED AVERAGE GLUCOSE RESULT: 6.4 % — HIGH (ref 4–5.6)
ALBUMIN SERPL ELPH-MCNC: 4.4 G/DL — SIGNIFICANT CHANGE UP (ref 3.5–5.2)
ALP SERPL-CCNC: 61 U/L — SIGNIFICANT CHANGE UP (ref 30–115)
ALT FLD-CCNC: 18 U/L — SIGNIFICANT CHANGE UP (ref 0–41)
ANION GAP SERPL CALC-SCNC: 11 MMOL/L — SIGNIFICANT CHANGE UP (ref 7–14)
APPEARANCE UR: CLEAR — SIGNIFICANT CHANGE UP
APTT BLD: 33.8 SEC — SIGNIFICANT CHANGE UP (ref 27–39.2)
AST SERPL-CCNC: 19 U/L — SIGNIFICANT CHANGE UP (ref 0–41)
BASOPHILS # BLD AUTO: 0.03 K/UL — SIGNIFICANT CHANGE UP (ref 0–0.2)
BASOPHILS NFR BLD AUTO: 0.7 % — SIGNIFICANT CHANGE UP (ref 0–1)
BILIRUB SERPL-MCNC: 1.6 MG/DL — HIGH (ref 0.2–1.2)
BILIRUB UR-MCNC: NEGATIVE — SIGNIFICANT CHANGE UP
BUN SERPL-MCNC: 17 MG/DL — SIGNIFICANT CHANGE UP (ref 10–20)
CALCIUM SERPL-MCNC: 9.6 MG/DL — SIGNIFICANT CHANGE UP (ref 8.5–10.1)
CHLORIDE SERPL-SCNC: 104 MMOL/L — SIGNIFICANT CHANGE UP (ref 98–110)
CO2 SERPL-SCNC: 27 MMOL/L — SIGNIFICANT CHANGE UP (ref 17–32)
COLOR SPEC: SIGNIFICANT CHANGE UP
CREAT SERPL-MCNC: 0.7 MG/DL — SIGNIFICANT CHANGE UP (ref 0.7–1.5)
DIFF PNL FLD: NEGATIVE — SIGNIFICANT CHANGE UP
EOSINOPHIL # BLD AUTO: 0.16 K/UL — SIGNIFICANT CHANGE UP (ref 0–0.7)
EOSINOPHIL NFR BLD AUTO: 3.6 % — SIGNIFICANT CHANGE UP (ref 0–8)
ESTIMATED AVERAGE GLUCOSE: 137 MG/DL — HIGH (ref 68–114)
GLUCOSE SERPL-MCNC: 117 MG/DL — HIGH (ref 70–99)
GLUCOSE UR QL: NEGATIVE — SIGNIFICANT CHANGE UP
HCT VFR BLD CALC: 39.6 % — SIGNIFICANT CHANGE UP (ref 37–47)
HGB BLD-MCNC: 12.7 G/DL — SIGNIFICANT CHANGE UP (ref 12–16)
IMM GRANULOCYTES NFR BLD AUTO: 0.2 % — SIGNIFICANT CHANGE UP (ref 0.1–0.3)
INR BLD: 1.18 RATIO — SIGNIFICANT CHANGE UP (ref 0.65–1.3)
KETONES UR-MCNC: NEGATIVE — SIGNIFICANT CHANGE UP
LEUKOCYTE ESTERASE UR-ACNC: NEGATIVE — SIGNIFICANT CHANGE UP
LYMPHOCYTES # BLD AUTO: 2.02 K/UL — SIGNIFICANT CHANGE UP (ref 1.2–3.4)
LYMPHOCYTES # BLD AUTO: 44.9 % — SIGNIFICANT CHANGE UP (ref 20.5–51.1)
MCHC RBC-ENTMCNC: 30.6 PG — SIGNIFICANT CHANGE UP (ref 27–31)
MCHC RBC-ENTMCNC: 32.1 G/DL — SIGNIFICANT CHANGE UP (ref 32–37)
MCV RBC AUTO: 95.4 FL — SIGNIFICANT CHANGE UP (ref 81–99)
MONOCYTES # BLD AUTO: 0.34 K/UL — SIGNIFICANT CHANGE UP (ref 0.1–0.6)
MONOCYTES NFR BLD AUTO: 7.6 % — SIGNIFICANT CHANGE UP (ref 1.7–9.3)
NEUTROPHILS # BLD AUTO: 1.94 K/UL — SIGNIFICANT CHANGE UP (ref 1.4–6.5)
NEUTROPHILS NFR BLD AUTO: 43 % — SIGNIFICANT CHANGE UP (ref 42.2–75.2)
NITRITE UR-MCNC: NEGATIVE — SIGNIFICANT CHANGE UP
NRBC # BLD: 0 /100 WBCS — SIGNIFICANT CHANGE UP (ref 0–0)
PH UR: 7.5 — SIGNIFICANT CHANGE UP (ref 5–8)
PLATELET # BLD AUTO: 259 K/UL — SIGNIFICANT CHANGE UP (ref 130–400)
POTASSIUM SERPL-MCNC: 5.2 MMOL/L — HIGH (ref 3.5–5)
POTASSIUM SERPL-SCNC: 5.2 MMOL/L — HIGH (ref 3.5–5)
PROT SERPL-MCNC: 7.3 G/DL — SIGNIFICANT CHANGE UP (ref 6–8)
PROT UR-MCNC: NEGATIVE — SIGNIFICANT CHANGE UP
PROTHROM AB SERPL-ACNC: 13.6 SEC — HIGH (ref 9.95–12.87)
RBC # BLD: 4.15 M/UL — LOW (ref 4.2–5.4)
RBC # FLD: 13.8 % — SIGNIFICANT CHANGE UP (ref 11.5–14.5)
SODIUM SERPL-SCNC: 142 MMOL/L — SIGNIFICANT CHANGE UP (ref 135–146)
SP GR SPEC: 1.01 — SIGNIFICANT CHANGE UP (ref 1.01–1.03)
UROBILINOGEN FLD QL: SIGNIFICANT CHANGE UP
WBC # BLD: 4.5 K/UL — LOW (ref 4.8–10.8)
WBC # FLD AUTO: 4.5 K/UL — LOW (ref 4.8–10.8)

## 2020-11-09 PROCEDURE — 93010 ELECTROCARDIOGRAM REPORT: CPT

## 2020-11-09 NOTE — H&P PST ADULT - REASON FOR ADMISSION
PT PRESENTS TO PAST WITH NO SOB, CP, PALPITATIONS, DYSURIA, UTI OR URI AT PRESENT.   PT ABLE TO WALK UP 2-3 FLIGHTS OF STEPS WITH NO SOB.  AS PER THE PT, THIS IS HIS/HER COMPLETE MEDICAL AND SURGICAL HX, INCLUDING MEDICATIONS PRESCRIBED AND OVER THE COUNTER  pt denies any covid s/s, or tested positive in the past  pt advised self quarantine till day of procedure  denies travel outside the [place] in the past 30 days  Anesthesia Alert  NO--Difficult Airway  NO--History of neck surgery or radiation  NO--Limited ROM of neck  NO--History of Malignant hyperthermia  NO--Personal or family history of Pseudocholinesterase deficiency  NO--Prior Anesthesia Complication  NO--Latex Allergy  NO--Loose teeth  NO--History of Rheumatoid Arthritis  NO--TIFFANIE  NO--Other_____  PT SCHEDULED FOR TOTAL LAPAROSCOPIC HYSTERECTOMY B/L SALPING OOPHORECTOMIES FROZEN SECTION.   PT REPORTS--I HAVE A MASS ON MY LEFT OVARY.   I HAVE SOME DISCOMFORT-WHICH HAS INCREASED THE LAST 2-3 WEEKS.

## 2020-11-09 NOTE — H&P PST ADULT - ATTENDING COMMENTS
70 year old female referred by Dr. Shakeel Ham. Patient had episode of chest pain in August for which a CTA was performed. An incidental finding revealed a left adnexal mass. A pelvic ultrasound was done, as well as a dedicated CT of Abdomen and Pelvis. There is a 8.3 x 8.8 x 7.4 cm left adnexal mass with solid components. The cystic component of the mass likely represents a dilated fallopian tube with foci of neoplasm CA-125 is 15. The patient underwent coronary artery stenting in 2002 and has history of A-Fib. She is taking Eloquis 5mg. qd. She is requesting definitive surgical management. She is a Muslim and will only consent to cell saver.    Assessment  Adnexal mass (625.8) (N94.89)    Options for surgical management discussed. Procedures offered patient included laparoscopic hysterectomy with bilateral salpingo-oophorectomy, along with possible staging.     The risk benefits and alternative to the recommended treatments were discussed. She was informed about potential complications of surgery including but not limited to bowel, bladder, and ureteral injuries. Infectious morbidity, along with bleeding and thromboembolic events were also discussed.  She showed clear understanding, was given the opportunity to ask questions and is amenable to the above surgical treatment.

## 2020-11-09 NOTE — H&P PST ADULT - NSICDXPASTSURGICALHX_GEN_ALL_CORE_FT
PAST SURGICAL HISTORY:  H/O right coronary artery stent placement     History of cholecystectomy

## 2020-11-09 NOTE — H&P PST ADULT - RS GEN PE MLT RESP DETAILS PC
clear to auscultation bilaterally/normal/airway patent/breath sounds equal/no chest wall tenderness/good air movement/respirations non-labored

## 2020-11-09 NOTE — H&P PST ADULT - NSICDXPASTMEDICALHX_GEN_ALL_CORE_FT
PAST MEDICAL HISTORY:  Arthritis OA    Atrial fibrillation CARDIAC STENT    Coronary heart disease     Diabetes     GERD (gastroesophageal reflux disease)     High cholesterol     Hypertension     Hypothyroid PT REPORTS- I HAVE BEEN ON THE SAME DOSE OF MEDICATIONS FOR ABOUT 1.5 YRS.  THYROIDS LEVELS WHERE 6 MONTHS AGO.     PAST MEDICAL HISTORY:  Arthritis OA    Atrial fibrillation CARDIAC STENT    Coronary heart disease     Diabetes     GERD (gastroesophageal reflux disease)     High cholesterol     Hypertension     Hypothyroid PT REPORTS- I HAVE BEEN ON THE SAME DOSE OF MEDICATIONS FOR ABOUT 1.5 YRS.  THYROIDS LEVELS WHERE 6 MONTHS AGO.    Pneumonia x2  2017

## 2020-11-24 ENCOUNTER — APPOINTMENT (OUTPATIENT)
Dept: CARDIOLOGY | Facility: CLINIC | Age: 70
End: 2020-11-24
Payer: MEDICARE

## 2020-11-24 VITALS
DIASTOLIC BLOOD PRESSURE: 86 MMHG | WEIGHT: 194 LBS | HEART RATE: 76 BPM | SYSTOLIC BLOOD PRESSURE: 140 MMHG | HEIGHT: 61 IN | BODY MASS INDEX: 36.63 KG/M2 | TEMPERATURE: 97.7 F

## 2020-11-24 PROBLEM — I48.91 UNSPECIFIED ATRIAL FIBRILLATION: Chronic | Status: ACTIVE | Noted: 2018-07-23

## 2020-11-24 PROBLEM — M19.90 UNSPECIFIED OSTEOARTHRITIS, UNSPECIFIED SITE: Chronic | Status: ACTIVE | Noted: 2018-07-23

## 2020-11-24 PROBLEM — E03.9 HYPOTHYROIDISM, UNSPECIFIED: Chronic | Status: ACTIVE | Noted: 2018-07-23

## 2020-11-24 PROBLEM — J18.9 PNEUMONIA, UNSPECIFIED ORGANISM: Chronic | Status: ACTIVE | Noted: 2020-11-09

## 2020-11-24 PROCEDURE — 99214 OFFICE O/P EST MOD 30 MIN: CPT

## 2020-11-24 PROCEDURE — 93000 ELECTROCARDIOGRAM COMPLETE: CPT

## 2020-11-24 RX ORDER — ALPRAZOLAM 0.25 MG/1
0.25 TABLET ORAL
Qty: 30 | Refills: 0 | Status: COMPLETED | COMMUNITY
Start: 2018-11-06 | End: 2020-11-24

## 2020-11-25 ENCOUNTER — RESULT CHARGE (OUTPATIENT)
Age: 70
End: 2020-11-25

## 2020-11-27 ENCOUNTER — OUTPATIENT (OUTPATIENT)
Dept: OUTPATIENT SERVICES | Facility: HOSPITAL | Age: 70
LOS: 1 days | Discharge: HOME | End: 2020-11-27

## 2020-11-27 DIAGNOSIS — Z90.49 ACQUIRED ABSENCE OF OTHER SPECIFIED PARTS OF DIGESTIVE TRACT: Chronic | ICD-10-CM

## 2020-11-27 DIAGNOSIS — Z11.59 ENCOUNTER FOR SCREENING FOR OTHER VIRAL DISEASES: ICD-10-CM

## 2020-11-27 DIAGNOSIS — Z95.5 PRESENCE OF CORONARY ANGIOPLASTY IMPLANT AND GRAFT: Chronic | ICD-10-CM

## 2020-11-30 ENCOUNTER — RESULT REVIEW (OUTPATIENT)
Age: 70
End: 2020-11-30

## 2020-11-30 ENCOUNTER — INPATIENT (INPATIENT)
Facility: HOSPITAL | Age: 70
LOS: 0 days | Discharge: HOME | End: 2020-12-01
Attending: SPECIALIST | Admitting: SPECIALIST
Payer: MEDICARE

## 2020-11-30 ENCOUNTER — APPOINTMENT (OUTPATIENT)
Dept: GYNECOLOGIC ONCOLOGY | Facility: HOSPITAL | Age: 70
End: 2020-11-30
Payer: MEDICARE

## 2020-11-30 VITALS
SYSTOLIC BLOOD PRESSURE: 121 MMHG | DIASTOLIC BLOOD PRESSURE: 60 MMHG | TEMPERATURE: 98 F | RESPIRATION RATE: 19 BRPM | OXYGEN SATURATION: 99 % | HEART RATE: 71 BPM | HEIGHT: 61 IN | WEIGHT: 192.02 LBS

## 2020-11-30 DIAGNOSIS — Z90.49 ACQUIRED ABSENCE OF OTHER SPECIFIED PARTS OF DIGESTIVE TRACT: Chronic | ICD-10-CM

## 2020-11-30 DIAGNOSIS — Z95.5 PRESENCE OF CORONARY ANGIOPLASTY IMPLANT AND GRAFT: Chronic | ICD-10-CM

## 2020-11-30 LAB
ANION GAP SERPL CALC-SCNC: 13 MMOL/L — SIGNIFICANT CHANGE UP (ref 7–14)
BUN SERPL-MCNC: 15 MG/DL — SIGNIFICANT CHANGE UP (ref 10–20)
CALCIUM SERPL-MCNC: 9.2 MG/DL — SIGNIFICANT CHANGE UP (ref 8.5–10.1)
CHLORIDE SERPL-SCNC: 98 MMOL/L — SIGNIFICANT CHANGE UP (ref 98–110)
CO2 SERPL-SCNC: 25 MMOL/L — SIGNIFICANT CHANGE UP (ref 17–32)
CREAT SERPL-MCNC: 0.7 MG/DL — SIGNIFICANT CHANGE UP (ref 0.7–1.5)
GLUCOSE BLDC GLUCOMTR-MCNC: 119 MG/DL — HIGH (ref 70–99)
GLUCOSE BLDC GLUCOMTR-MCNC: 137 MG/DL — HIGH (ref 70–99)
GLUCOSE SERPL-MCNC: 165 MG/DL — HIGH (ref 70–99)
HCT VFR BLD CALC: 36.3 % — LOW (ref 37–47)
HGB BLD-MCNC: 12.3 G/DL — SIGNIFICANT CHANGE UP (ref 12–16)
MAGNESIUM SERPL-MCNC: 1.7 MG/DL — LOW (ref 1.8–2.4)
MCHC RBC-ENTMCNC: 31.2 PG — HIGH (ref 27–31)
MCHC RBC-ENTMCNC: 33.9 G/DL — SIGNIFICANT CHANGE UP (ref 32–37)
MCV RBC AUTO: 92.1 FL — SIGNIFICANT CHANGE UP (ref 81–99)
NRBC # BLD: 0 /100 WBCS — SIGNIFICANT CHANGE UP (ref 0–0)
PLATELET # BLD AUTO: 226 K/UL — SIGNIFICANT CHANGE UP (ref 130–400)
POTASSIUM SERPL-MCNC: 3.8 MMOL/L — SIGNIFICANT CHANGE UP (ref 3.5–5)
POTASSIUM SERPL-SCNC: 3.8 MMOL/L — SIGNIFICANT CHANGE UP (ref 3.5–5)
RBC # BLD: 3.94 M/UL — LOW (ref 4.2–5.4)
RBC # FLD: 13.1 % — SIGNIFICANT CHANGE UP (ref 11.5–14.5)
SODIUM SERPL-SCNC: 136 MMOL/L — SIGNIFICANT CHANGE UP (ref 135–146)
WBC # BLD: 7.4 K/UL — SIGNIFICANT CHANGE UP (ref 4.8–10.8)
WBC # FLD AUTO: 7.4 K/UL — SIGNIFICANT CHANGE UP (ref 4.8–10.8)

## 2020-11-30 PROCEDURE — 58575 LAPS TOT HYST RESJ MAL: CPT

## 2020-11-30 PROCEDURE — 88307 TISSUE EXAM BY PATHOLOGIST: CPT | Mod: 26

## 2020-11-30 PROCEDURE — 38570 LAPAROSCOPY LYMPH NODE BIOP: CPT

## 2020-11-30 PROCEDURE — 88112 CYTOPATH CELL ENHANCE TECH: CPT | Mod: 26

## 2020-11-30 PROCEDURE — 88305 TISSUE EXAM BY PATHOLOGIST: CPT | Mod: 26

## 2020-11-30 PROCEDURE — 57283 COLPOPEXY INTRAPERITONEAL: CPT | Mod: 59

## 2020-11-30 RX ORDER — ONDANSETRON 8 MG/1
4 TABLET, FILM COATED ORAL ONCE
Refills: 0 | Status: DISCONTINUED | OUTPATIENT
Start: 2020-11-30 | End: 2020-12-01

## 2020-11-30 RX ORDER — HYDROMORPHONE HYDROCHLORIDE 2 MG/ML
1 INJECTION INTRAMUSCULAR; INTRAVENOUS; SUBCUTANEOUS
Refills: 0 | Status: DISCONTINUED | OUTPATIENT
Start: 2020-11-30 | End: 2020-12-01

## 2020-11-30 RX ORDER — SIMETHICONE 80 MG/1
80 TABLET, CHEWABLE ORAL EVERY 6 HOURS
Refills: 0 | Status: DISCONTINUED | OUTPATIENT
Start: 2020-11-30 | End: 2020-12-01

## 2020-11-30 RX ORDER — AMLODIPINE BESYLATE 2.5 MG/1
2.5 TABLET ORAL
Refills: 0 | Status: DISCONTINUED | OUTPATIENT
Start: 2020-11-30 | End: 2020-12-01

## 2020-11-30 RX ORDER — ATORVASTATIN CALCIUM 80 MG/1
80 TABLET, FILM COATED ORAL AT BEDTIME
Refills: 0 | Status: DISCONTINUED | OUTPATIENT
Start: 2020-11-30 | End: 2020-12-01

## 2020-11-30 RX ORDER — SOTALOL HCL 120 MG
80 TABLET ORAL
Refills: 0 | Status: DISCONTINUED | OUTPATIENT
Start: 2020-12-01 | End: 2020-12-01

## 2020-11-30 RX ORDER — BACITRACIN ZINC 500 UNIT/G
1 OINTMENT IN PACKET (EA) TOPICAL ONCE
Refills: 0 | Status: COMPLETED | OUTPATIENT
Start: 2020-11-30 | End: 2020-11-30

## 2020-11-30 RX ORDER — SODIUM CHLORIDE 9 MG/ML
1000 INJECTION INTRAMUSCULAR; INTRAVENOUS; SUBCUTANEOUS
Refills: 0 | Status: DISCONTINUED | OUTPATIENT
Start: 2020-11-30 | End: 2020-12-01

## 2020-11-30 RX ORDER — LOSARTAN POTASSIUM 100 MG/1
100 TABLET, FILM COATED ORAL DAILY
Refills: 0 | Status: DISCONTINUED | OUTPATIENT
Start: 2020-11-30 | End: 2020-12-01

## 2020-11-30 RX ORDER — SENNA PLUS 8.6 MG/1
1 TABLET ORAL AT BEDTIME
Refills: 0 | Status: DISCONTINUED | OUTPATIENT
Start: 2020-11-30 | End: 2020-12-01

## 2020-11-30 RX ORDER — ACETAMINOPHEN 500 MG
1000 TABLET ORAL EVERY 6 HOURS
Refills: 0 | Status: DISCONTINUED | OUTPATIENT
Start: 2020-11-30 | End: 2020-12-01

## 2020-11-30 RX ORDER — PANTOPRAZOLE SODIUM 20 MG/1
40 TABLET, DELAYED RELEASE ORAL
Refills: 0 | Status: DISCONTINUED | OUTPATIENT
Start: 2020-11-30 | End: 2020-12-01

## 2020-11-30 RX ORDER — IBUPROFEN 200 MG
600 TABLET ORAL EVERY 6 HOURS
Refills: 0 | Status: DISCONTINUED | OUTPATIENT
Start: 2020-11-30 | End: 2020-12-01

## 2020-11-30 RX ORDER — ONDANSETRON 8 MG/1
8 TABLET, FILM COATED ORAL EVERY 8 HOURS
Refills: 0 | Status: DISCONTINUED | OUTPATIENT
Start: 2020-11-30 | End: 2020-12-01

## 2020-11-30 RX ORDER — METFORMIN HYDROCHLORIDE 850 MG/1
1000 TABLET ORAL
Refills: 0 | Status: DISCONTINUED | OUTPATIENT
Start: 2020-12-01 | End: 2020-12-01

## 2020-11-30 RX ORDER — SODIUM CHLORIDE 9 MG/ML
1000 INJECTION, SOLUTION INTRAVENOUS
Refills: 0 | Status: DISCONTINUED | OUTPATIENT
Start: 2020-11-30 | End: 2020-12-01

## 2020-11-30 RX ORDER — OXYCODONE HYDROCHLORIDE 5 MG/1
5 TABLET ORAL EVERY 4 HOURS
Refills: 0 | Status: DISCONTINUED | OUTPATIENT
Start: 2020-11-30 | End: 2020-12-01

## 2020-11-30 RX ORDER — LEVOTHYROXINE SODIUM 125 MCG
88 TABLET ORAL DAILY
Refills: 0 | Status: DISCONTINUED | OUTPATIENT
Start: 2020-11-30 | End: 2020-12-01

## 2020-11-30 RX ORDER — HYDROMORPHONE HYDROCHLORIDE 2 MG/ML
0.5 INJECTION INTRAMUSCULAR; INTRAVENOUS; SUBCUTANEOUS ONCE
Refills: 0 | Status: DISCONTINUED | OUTPATIENT
Start: 2020-11-30 | End: 2020-12-01

## 2020-11-30 RX ADMIN — Medication 1000 MILLIGRAM(S): at 23:03

## 2020-11-30 RX ADMIN — Medication 600 MILLIGRAM(S): at 23:03

## 2020-11-30 RX ADMIN — Medication 1000 MILLIGRAM(S): at 18:26

## 2020-11-30 RX ADMIN — Medication 1 APPLICATION(S): at 17:38

## 2020-11-30 RX ADMIN — SIMETHICONE 80 MILLIGRAM(S): 80 TABLET, CHEWABLE ORAL at 18:26

## 2020-11-30 RX ADMIN — SIMETHICONE 80 MILLIGRAM(S): 80 TABLET, CHEWABLE ORAL at 23:04

## 2020-11-30 RX ADMIN — Medication 1000 MILLIGRAM(S): at 18:52

## 2020-11-30 RX ADMIN — HYDROMORPHONE HYDROCHLORIDE 1 MILLIGRAM(S): 2 INJECTION INTRAMUSCULAR; INTRAVENOUS; SUBCUTANEOUS at 12:35

## 2020-11-30 RX ADMIN — SODIUM CHLORIDE 125 MILLILITER(S): 9 INJECTION, SOLUTION INTRAVENOUS at 14:24

## 2020-11-30 RX ADMIN — AMLODIPINE BESYLATE 2.5 MILLIGRAM(S): 2.5 TABLET ORAL at 18:27

## 2020-11-30 RX ADMIN — Medication 600 MILLIGRAM(S): at 18:52

## 2020-11-30 RX ADMIN — ATORVASTATIN CALCIUM 80 MILLIGRAM(S): 80 TABLET, FILM COATED ORAL at 23:03

## 2020-11-30 RX ADMIN — HYDROMORPHONE HYDROCHLORIDE 1 MILLIGRAM(S): 2 INJECTION INTRAMUSCULAR; INTRAVENOUS; SUBCUTANEOUS at 13:05

## 2020-11-30 RX ADMIN — Medication 600 MILLIGRAM(S): at 18:26

## 2020-11-30 NOTE — BRIEF OPERATIVE NOTE - OPERATION/FINDINGS
Atrophic external genitalia.  Normal appearing uterus, right fallopian tube, and right ovary.  Approximately 8-9 cm mass encompassing left ovary and left fallopian tube.  No evidence of peritoneal or abdominal implants.  Normal appearing retrocecal appendix.  Dense adhesions between omentum and anterior abdominal wall in the RUQ.

## 2020-11-30 NOTE — CHART NOTE - NSCHARTNOTEFT_GEN_A_CORE
PACU ANESTHESIA ADMISSION NOTE      Procedure: Omentectomy    Laparoscopic pelvic lymphadenectomy with biopsy    Lysis of pelvic adhesions    Laparoscopic hysterectomy, total, with BSO, uterus 250 g or less      Post op diagnosis:  Ovarian cancer      __x__  Patent Airway    __x__  Full return of protective reflexes    __x__  Full recovery from anesthesia / back to baseline     Vitals:   T: 97.4          R:  17                BP:   139/67               Sat:  100%                  P: 58      Mental Status:  __x__ Awake   _x____ Alert   _____ Drowsy   _____ Sedated    Nausea/Vomiting:  _x___ NO  ______Yes,   See Post - Op Orders          Pain Scale (0-10):  _____    Treatment: ____ None    ___x_ See Post - Op/PCA Orders    Post - Operative Fluids:   ____ Oral   __x__ See Post - Op Orders    Plan: Discharge:   ____Home       _x____Floor     _____Critical Care    _____  Other:_________________    Comments:  Uneventful intraoperative course. No anesthesia issues or complications noted. Patient stable upon arrival to PACU. Report given to RN. Discharge when criteria met.

## 2020-11-30 NOTE — BRIEF OPERATIVE NOTE - SPECIMENS
1. Left adnexal mass (left ovary and left fallopian tube. 2. Uterus, cervix, right fallopian tube, right ovary 3. Omentum. 4. Left pelvic lymph nodes 5. Pelvic washings 6. LLQ washings 7. RLQ washings 8. Sub diaphragm washings

## 2020-11-30 NOTE — PROGRESS NOTE ADULT - ASSESSMENT
A/P: 69yo PMH HTN, afib, hypothyroidism, DM, HLD s/p TLH, BSO, omentectomy, lymphadenectomy, frozen section: papillary serous carcinoma. EBL 50cc, POD#0, recovering well  - continue routine postop care  - pain management PRN  - monitor vitals and bleeding  - parisi til AM, f/u UO  - SCDs  - regular/IVF  - continue home meds  - FS ACQHS  - f/u AM labs    Dr. Rabago aware, will discuss with Dr. Slater.

## 2020-11-30 NOTE — PROGRESS NOTE ADULT - SUBJECTIVE AND OBJECTIVE BOX
PGY 2 Note  Patient seen and evaluated at bedside. Doing well, no acute complaints. Denies fever, chills, CP, SOB, N/V, severe abdominal pain, vaginal bleeding, or LE pain/swelling. Pain well controlled. Ackerman in place, not yet ambulating or passing flatus.    Physical exam:    Vital Signs Last 24 Hrs  T(F): 98.1 (30 Nov 2020 19:00), Max: 98.1 (30 Nov 2020 19:00)  HR: 72 (30 Nov 2020 20:00) (57 - 78)  BP: 145/75 (30 Nov 2020 20:00) (121/60 - 167/75)  RR: 16 (30 Nov 2020 20:00) (12 - 19)  SpO2: 97% (30 Nov 2020 20:00) (96% - 100%)    Gen: alert, oriented  CVS: RRR  Lungs: CTAB  Abdomen: Soft, nontender, non distended. No rebound, rigidity or guarding. Incisions clean, dry, intact  Perineum: no active bleeding. Ackerman in place, clear urine  Ext: No calf tenderness    f/u UO (min: 44c/hr): 800cc (0356-2303), 60cc (2277-3650)    Diet: regular    MEDICATIONS  (STANDING):  acetaminophen   Tablet .. 1000 milliGRAM(s) Oral every 6 hours  amLODIPine Oral Tab/Cap - Peds 2.5 milliGRAM(s) Oral two times a day  atorvastatin 80 milliGRAM(s) Oral at bedtime  ibuprofen  Tablet. 600 milliGRAM(s) Oral every 6 hours  lactated ringers. 1000 milliLiter(s) (125 mL/Hr) IV Continuous <Continuous>  levothyroxine 88 MICROGram(s) Oral daily  losartan 100 milliGRAM(s) Oral daily  pantoprazole    Tablet 40 milliGRAM(s) Oral before breakfast  simethicone 80 milliGRAM(s) Chew every 6 hours  sodium chloride 0.9%. 1000 milliLiter(s) (100 mL/Hr) IV Continuous <Continuous>    MEDICATIONS  (PRN):  HYDROmorphone  Injectable 1 milliGRAM(s) IV Push every 10 minutes PRN Severe Pain (7 - 10)  HYDROmorphone  Injectable 0.5 milliGRAM(s) IV Push once PRN Moderate Pain (4 - 6)  ondansetron    Tablet 8 milliGRAM(s) Oral every 8 hours PRN Nausea and/or Vomiting  ondansetron Injectable 4 milliGRAM(s) IV Push once PRN Nausea and/or Vomiting  oxyCODONE    IR 5 milliGRAM(s) Oral every 4 hours PRN Severe Pain (7 - 10)  senna 1 Tablet(s) Oral at bedtime PRN Constipation      LABS:                        12.3   7.40  )-----------( 226      ( 30 Nov 2020 19:20 )             36.3     Magnesium, Serum: 1.7 mg/dL (11-30 @ 19:20)    11-30-20 @ 19:20      136  |  98  |  15  ----------------------------<  165<H>  3.8   |  25  |  0.7        Ca    9.2      30 Nov 2020 19:20  Mg     1.7<L>     11-30 11-30-20 @ 07:01  -  11-30-20 @ 22:10  --------------------------------------------------------  IN: 1300 mL / OUT: 1460 mL / NET: -160 mL

## 2020-11-30 NOTE — BRIEF OPERATIVE NOTE - NSICDXBRIEFPROCEDURE_GEN_ALL_CORE_FT
PROCEDURES:  Omentectomy 30-Nov-2020 11:38:46  Timothy Márquez  Laparoscopic pelvic lymphadenectomy with biopsy 30-Nov-2020 11:38:04  Timothy Márquez  Lysis of pelvic adhesions 30-Nov-2020 11:37:23  Timothy Márquez  Laparoscopic hysterectomy, total, with BSO, uterus 250 g or less 30-Nov-2020 11:37:16  Timothy Márquez

## 2020-11-30 NOTE — PRE-ANESTHESIA EVALUATION ADULT - NSANTHPMHFT_GEN_ALL_CORE
Chart reviewed, Med/Cardiology evaluation seen. pt interviewed and examined. labs, EKG seen.  Pt on Blood avoidance management. H/H 12.7/39.6. Took the beta blocker last night. Eliquis  on hold for 3 days. FS 119mg/dl.

## 2020-12-01 ENCOUNTER — TRANSCRIPTION ENCOUNTER (OUTPATIENT)
Age: 70
End: 2020-12-01

## 2020-12-01 ENCOUNTER — RESULT REVIEW (OUTPATIENT)
Age: 70
End: 2020-12-01

## 2020-12-01 VITALS
SYSTOLIC BLOOD PRESSURE: 137 MMHG | HEART RATE: 68 BPM | DIASTOLIC BLOOD PRESSURE: 60 MMHG | RESPIRATION RATE: 18 BRPM | TEMPERATURE: 98 F

## 2020-12-01 LAB
ANION GAP SERPL CALC-SCNC: 12 MMOL/L — SIGNIFICANT CHANGE UP (ref 7–14)
BUN SERPL-MCNC: 11 MG/DL — SIGNIFICANT CHANGE UP (ref 10–20)
CALCIUM SERPL-MCNC: 8.7 MG/DL — SIGNIFICANT CHANGE UP (ref 8.5–10.1)
CHLORIDE SERPL-SCNC: 100 MMOL/L — SIGNIFICANT CHANGE UP (ref 98–110)
CO2 SERPL-SCNC: 25 MMOL/L — SIGNIFICANT CHANGE UP (ref 17–32)
CREAT SERPL-MCNC: 0.6 MG/DL — LOW (ref 0.7–1.5)
GLUCOSE BLDC GLUCOMTR-MCNC: 114 MG/DL — HIGH (ref 70–99)
GLUCOSE BLDC GLUCOMTR-MCNC: 128 MG/DL — HIGH (ref 70–99)
GLUCOSE BLDC GLUCOMTR-MCNC: 168 MG/DL — HIGH (ref 70–99)
GLUCOSE SERPL-MCNC: 119 MG/DL — HIGH (ref 70–99)
HCT VFR BLD CALC: 32.1 % — LOW (ref 37–47)
HGB BLD-MCNC: 10.8 G/DL — LOW (ref 12–16)
MAGNESIUM SERPL-MCNC: 1.7 MG/DL — LOW (ref 1.8–2.4)
MCHC RBC-ENTMCNC: 30.7 PG — SIGNIFICANT CHANGE UP (ref 27–31)
MCHC RBC-ENTMCNC: 33.6 G/DL — SIGNIFICANT CHANGE UP (ref 32–37)
MCV RBC AUTO: 91.2 FL — SIGNIFICANT CHANGE UP (ref 81–99)
NON-GYNECOLOGICAL CYTOLOGY STUDY: SIGNIFICANT CHANGE UP
NRBC # BLD: 0 /100 WBCS — SIGNIFICANT CHANGE UP (ref 0–0)
PLATELET # BLD AUTO: 212 K/UL — SIGNIFICANT CHANGE UP (ref 130–400)
POTASSIUM SERPL-MCNC: 3.8 MMOL/L — SIGNIFICANT CHANGE UP (ref 3.5–5)
POTASSIUM SERPL-SCNC: 3.8 MMOL/L — SIGNIFICANT CHANGE UP (ref 3.5–5)
RBC # BLD: 3.52 M/UL — LOW (ref 4.2–5.4)
RBC # FLD: 13.2 % — SIGNIFICANT CHANGE UP (ref 11.5–14.5)
SODIUM SERPL-SCNC: 137 MMOL/L — SIGNIFICANT CHANGE UP (ref 135–146)
WBC # BLD: 5.93 K/UL — SIGNIFICANT CHANGE UP (ref 4.8–10.8)
WBC # FLD AUTO: 5.93 K/UL — SIGNIFICANT CHANGE UP (ref 4.8–10.8)

## 2020-12-01 RX ORDER — SIMETHICONE 80 MG/1
1 TABLET, CHEWABLE ORAL
Qty: 30 | Refills: 0
Start: 2020-12-01

## 2020-12-01 RX ORDER — OXYCODONE AND ACETAMINOPHEN 5; 325 MG/1; MG/1
1 TABLET ORAL
Qty: 12 | Refills: 0
Start: 2020-12-01

## 2020-12-01 RX ORDER — IBUPROFEN 200 MG
1 TABLET ORAL
Qty: 40 | Refills: 0
Start: 2020-12-01

## 2020-12-01 RX ADMIN — Medication 600 MILLIGRAM(S): at 00:00

## 2020-12-01 RX ADMIN — Medication 1000 MILLIGRAM(S): at 17:02

## 2020-12-01 RX ADMIN — Medication 80 MILLIGRAM(S): at 05:45

## 2020-12-01 RX ADMIN — METFORMIN HYDROCHLORIDE 1000 MILLIGRAM(S): 850 TABLET ORAL at 05:45

## 2020-12-01 RX ADMIN — Medication 1000 MILLIGRAM(S): at 12:03

## 2020-12-01 RX ADMIN — AMLODIPINE BESYLATE 2.5 MILLIGRAM(S): 2.5 TABLET ORAL at 17:02

## 2020-12-01 RX ADMIN — SIMETHICONE 80 MILLIGRAM(S): 80 TABLET, CHEWABLE ORAL at 05:45

## 2020-12-01 RX ADMIN — Medication 600 MILLIGRAM(S): at 17:02

## 2020-12-01 RX ADMIN — Medication 600 MILLIGRAM(S): at 12:03

## 2020-12-01 RX ADMIN — SIMETHICONE 80 MILLIGRAM(S): 80 TABLET, CHEWABLE ORAL at 17:01

## 2020-12-01 RX ADMIN — Medication 1000 MILLIGRAM(S): at 13:03

## 2020-12-01 RX ADMIN — Medication 1000 MILLIGRAM(S): at 05:45

## 2020-12-01 RX ADMIN — Medication 1000 MILLIGRAM(S): at 00:00

## 2020-12-01 RX ADMIN — AMLODIPINE BESYLATE 2.5 MILLIGRAM(S): 2.5 TABLET ORAL at 05:45

## 2020-12-01 RX ADMIN — Medication 80 MILLIGRAM(S): at 17:01

## 2020-12-01 RX ADMIN — Medication 600 MILLIGRAM(S): at 06:30

## 2020-12-01 RX ADMIN — SIMETHICONE 80 MILLIGRAM(S): 80 TABLET, CHEWABLE ORAL at 12:02

## 2020-12-01 RX ADMIN — Medication 1000 MILLIGRAM(S): at 06:30

## 2020-12-01 RX ADMIN — Medication 600 MILLIGRAM(S): at 05:45

## 2020-12-01 RX ADMIN — PANTOPRAZOLE SODIUM 40 MILLIGRAM(S): 20 TABLET, DELAYED RELEASE ORAL at 06:22

## 2020-12-01 RX ADMIN — Medication 600 MILLIGRAM(S): at 13:03

## 2020-12-01 RX ADMIN — METFORMIN HYDROCHLORIDE 1000 MILLIGRAM(S): 850 TABLET ORAL at 17:01

## 2020-12-01 RX ADMIN — LOSARTAN POTASSIUM 100 MILLIGRAM(S): 100 TABLET, FILM COATED ORAL at 05:45

## 2020-12-01 RX ADMIN — Medication 88 MICROGRAM(S): at 05:45

## 2020-12-01 NOTE — DISCHARGE NOTE PROVIDER - CARE PROVIDER_API CALL
Minerva Slater  GYNECOLOGIC ONCOLOGY  [address], [address]nd [place] [address]  Phone: [phone]  Fax: [phone]  Follow Up Time: 2 weeks

## 2020-12-01 NOTE — PROGRESS NOTE ADULT - ATTENDING COMMENTS
pod1     70 year old female   with newly diagnosed ovarian cancer clinically confined to the left adnexa. (Stage IC).   Patient seen at bedside, VSS afebrile, Abd soft none tender, Incisions clean and dry wound healing well.  She continues to experience some lower abdominal pain and bloating.  Plan to encourage ambulation and manage pain as per ERAS protocol.  I Agree with the residents evaluation and management. She will be  considered  for   Discharge today.

## 2020-12-01 NOTE — PROGRESS NOTE ADULT - ASSESSMENT
A/P: 71yo PMH HTN, afib, hypothyroidism, DM, HLD s/p TLH, BSO, omentectomy, lymphadenectomy, frozen section: papillary serous carcinoma. EBL 50cc, POD#1, monitoring for multiple comorbidities, recovering well    - continue routine postop care  - pain management PRN  - monitor vitals and bleeding  - parisi removed, trial of void by 1230  - SCDs  - regular/IVF  - continue home meds  - FS ACQHS  - f/u AM labs    Dr. Márquez aware, will discuss with Dr. Slater.   A/P: 69yo PMH HTN, afib, hypothyroidism, DM, HLD s/p TLH, BSO, omentectomy, lymphadenectomy, frozen section: papillary serous carcinoma. EBL 50cc, POD#1, observed overnight for monitoring for multiple comorbidities, recovering well    - pain management PRN  - monitor vitals and bleeding  - parisi removed, trial of void by 1230  - SCDs  - regular diet  - continue home meds  - Fingersticks ACQHS  - f/u AM labs  - Likely for home today, instructions and precautions discussed    Dr. Márquez aware, will discuss with Dr. Slater.

## 2020-12-01 NOTE — DISCHARGE NOTE PROVIDER - HOSPITAL COURSE
69yo h/o HTN, afib, hypothyroidism, DM, hyperlipidemia with an 8-9cm adnexal mass underwent TLH, BSO, omentectomy, lymphadenectomy, EBL 50cc, frozen section papillary carcinoma. She was observed overnight for multiple comorbidities. She recovered well and was discharged home after a voiding trial on POD 1

## 2020-12-01 NOTE — DISCHARGE NOTE NURSING/CASE MANAGEMENT/SOCIAL WORK - PATIENT PORTAL LINK FT
You can access the FollowMyHealth Patient Portal offered by [facility] by registering at the following website: [email or web address]. By joining [employer]’s FollowMyHealth portal, you will also be able to view your health information using other applications (apps) compatible with our system.

## 2020-12-01 NOTE — DISCHARGE NOTE PROVIDER - NSDCMRMEDTOKEN_GEN_ALL_CORE_FT
amLODIPine 2.5 mg oral tablet: 1 tab(s) orally 2 times a day  apixaban 5 mg oral tablet: 1 tab(s) orally every 12 hours  Aspir 81 oral delayed release tablet: 1 tab(s) orally once a day  atorvastatin 80 mg oral tablet: 1 tab(s) orally once a day  levothyroxine 88 mcg (0.088 mg) oral tablet: 1 tab(s) orally once a day  losartan 100 mg oral tablet: 1 tab(s) orally once a day  metFORMIN 1000 mg oral tablet: 1 tab(s) orally 2 times a day  pantoprazole 40 mg oral delayed release tablet: 1 tab(s) orally once a day  sotalol 80 mg oral tablet: 1 tab(s) orally 2 times a day  Victoza: subcutaneous once a day   amLODIPine 2.5 mg oral tablet: 1 tab(s) orally 2 times a day  apixaban 5 mg oral tablet: 1 tab(s) orally every 12 hours  Aspir 81 oral delayed release tablet: 1 tab(s) orally once a day  atorvastatin 80 mg oral tablet: 1 tab(s) orally once a day  ibuprofen 600 mg oral tablet: 1 tab(s) orally every 6 hours   levothyroxine 88 mcg (0.088 mg) oral tablet: 1 tab(s) orally once a day  losartan 100 mg oral tablet: 1 tab(s) orally once a day  metFORMIN 1000 mg oral tablet: 1 tab(s) orally 2 times a day  oxycodone-acetaminophen 5 mg-325 mg oral tablet: 1 tab(s) orally every 6 hours, As Needed MDD:4  pantoprazole 40 mg oral delayed release tablet: 1 tab(s) orally once a day  simethicone 80 mg oral tablet: 1 tab(s) orally every 8 hours   sotalol 80 mg oral tablet: 1 tab(s) orally 2 times a day  Victoza: subcutaneous once a day

## 2020-12-01 NOTE — DISCHARGE NOTE PROVIDER - NSDCFUSCHEDAPPT_GEN_ALL_CORE_FT
DERRICK DORMAN ; 12/18/2020 ; [facility] [address] [facility] DERRICK Velasco ; 02/24/2021 ; [facility] [address]

## 2020-12-01 NOTE — DISCHARGE NOTE PROVIDER - NSDCCPCAREPLAN_GEN_ALL_CORE_FT
PRINCIPAL DISCHARGE DIAGNOSIS  Diagnosis: S/P hysterectomy  Assessment and Plan of Treatment: If you have a fever of 100.4F, severe abdominal pain, or heavy vaginal bleeding, call Dr. Slater or come to the emergency department.  Nothing in the vagina for 6 weeks.

## 2020-12-01 NOTE — PROGRESS NOTE ADULT - SUBJECTIVE AND OBJECTIVE BOX
PGY1 Note:  Patient seen and examined at bedside, recovering well, no acute complaints. Denies fever, chills, chest pain, SOB, nausea, vomiting, LE pain. Pain well-controlled with medication. Passed flatus and tolerating regular diet, parisi in place. Not yet voiding, ambulating.    Physical Exam  Vital Signs Last 24 Hrs  T(C): 36.5 (01 Dec 2020 05:00), Max: 36.9 (30 Nov 2020 20:15)  T(F): 97.7 (01 Dec 2020 05:00), Max: 98.5 (30 Nov 2020 20:15)  HR: 78 (01 Dec 2020 05:00) (57 - 78)  BP: 151/67 (01 Dec 2020 05:00) (121/60 - 175/76)  RR: 18 (01 Dec 2020 05:00) (12 - 19)  SpO2: 97% (30 Nov 2020 20:00) (96% - 100%)    Gen: AAOx3, NAD  Heart: Normal S1S2, RRR, no m/r/g  Lungs: CTA b/l, no r/r/w   Fundus: firm, below umbilicus   Wound: c/d/i   Abd: Soft, nontender, nondistended, BS+  Ext: No calf tenderness, no swelling, SCDs in place  UO: (0195-9739) 200cc    Labs:                        12.3   7.40  )-----------( 226      ( 30 Nov 2020 19:20 )             36.3     Magnesium, Serum: 1.7 mg/dL (11-30 @ 19:20)    11-30-20 @ 19:20      136  |  98  |  15  ----------------------------<  165<H>  3.8   |  25  |  0.7        Ca    9.2      30 Nov 2020 19:20  Mg     1.7<L>     11-30         acetaminophen   Tablet .. 1000 milliGRAM(s) Oral every 6 hours  amLODIPine Oral Tab/Cap - Peds 2.5 milliGRAM(s) Oral two times a day  atorvastatin 80 milliGRAM(s) Oral at bedtime  HYDROmorphone  Injectable 1 milliGRAM(s) IV Push every 10 minutes PRN  HYDROmorphone  Injectable 0.5 milliGRAM(s) IV Push once PRN  ibuprofen  Tablet. 600 milliGRAM(s) Oral every 6 hours  lactated ringers. 1000 milliLiter(s) IV Continuous <Continuous>  levothyroxine 88 MICROGram(s) Oral daily  losartan 100 milliGRAM(s) Oral daily  metFORMIN 1000 milliGRAM(s) Oral two times a day  ondansetron    Tablet 8 milliGRAM(s) Oral every 8 hours PRN  ondansetron Injectable 4 milliGRAM(s) IV Push once PRN  oxyCODONE    IR 5 milliGRAM(s) Oral every 4 hours PRN  pantoprazole    Tablet 40 milliGRAM(s) Oral before breakfast  senna 1 Tablet(s) Oral at bedtime PRN  simethicone 80 milliGRAM(s) Chew every 6 hours  sodium chloride 0.9%. 1000 milliLiter(s) IV Continuous <Continuous>  sotalol 80 milliGRAM(s) Oral two times a day     Patient seen and examined at bedside, recovering well, no acute complaints. Denies fever, chills, chest pain, SOB, nausea, vomiting, LE pain. Pain well-controlled with medication. Passed flatus and tolerating regular diet, parisi in place. Not yet voiding, ambulating.    Physical Exam  Vital Signs Last 24 Hrs  T(C): 36.5 (01 Dec 2020 05:00), Max: 36.9 (30 Nov 2020 20:15)  T(F): 97.7 (01 Dec 2020 05:00), Max: 98.5 (30 Nov 2020 20:15)  HR: 78 (01 Dec 2020 05:00) (57 - 78)  BP: 151/67 (01 Dec 2020 05:00) (121/60 - 175/76)  RR: 18 (01 Dec 2020 05:00) (12 - 19)  SpO2: 97% (30 Nov 2020 20:00) (96% - 100%)    Gen: AAOx3, NAD  Heart: Normal S1S2, RRR, no m/r/g  Lungs: CTA b/l, no r/r/w   Fundus: firm, below umbilicus   Wound: clean, dry, intact surgical glue over laparoscopic skin incisions  Abd: Soft, nontender, nondistended, BS+  Ext: No calf tenderness, no swelling, SCDs in place  UO: 1900cc [id number], clear, adequate    Labs:                        12.3   7.40  )-----------( 226      ( 30 Nov 2020 19:20 )             36.3     Magnesium, Serum: 1.7 mg/dL (11-30 @ 19:20)    11-30-20 @ 19:20      136  |  98  |  15  ----------------------------<  165<H>  3.8   |  25  |  0.7        Ca    9.2      30 Nov 2020 19:20  Mg     1.7<L>     11-30         acetaminophen   Tablet .. 1000 milliGRAM(s) Oral every 6 hours  amLODIPine Oral Tab/Cap - Peds 2.5 milliGRAM(s) Oral two times a day  atorvastatin 80 milliGRAM(s) Oral at bedtime  HYDROmorphone  Injectable 1 milliGRAM(s) IV Push every 10 minutes PRN  HYDROmorphone  Injectable 0.5 milliGRAM(s) IV Push once PRN  ibuprofen  Tablet. 600 milliGRAM(s) Oral every 6 hours  lactated ringers. 1000 milliLiter(s) IV Continuous <Continuous>  levothyroxine 88 MICROGram(s) Oral daily  losartan 100 milliGRAM(s) Oral daily  metFORMIN 1000 milliGRAM(s) Oral two times a day  ondansetron    Tablet 8 milliGRAM(s) Oral every 8 hours PRN  ondansetron Injectable 4 milliGRAM(s) IV Push once PRN  oxyCODONE    IR 5 milliGRAM(s) Oral every 4 hours PRN  pantoprazole    Tablet 40 milliGRAM(s) Oral before breakfast  senna 1 Tablet(s) Oral at bedtime PRN  simethicone 80 milliGRAM(s) Chew every 6 hours  sodium chloride 0.9%. 1000 milliLiter(s) IV Continuous <Continuous>  sotalol 80 milliGRAM(s) Oral two times a day

## 2020-12-03 DIAGNOSIS — I48.91 UNSPECIFIED ATRIAL FIBRILLATION: ICD-10-CM

## 2020-12-03 DIAGNOSIS — C56.9 MALIGNANT NEOPLASM OF UNSPECIFIED OVARY: ICD-10-CM

## 2020-12-03 DIAGNOSIS — I10 ESSENTIAL (PRIMARY) HYPERTENSION: ICD-10-CM

## 2020-12-03 DIAGNOSIS — E11.9 TYPE 2 DIABETES MELLITUS WITHOUT COMPLICATIONS: ICD-10-CM

## 2020-12-03 DIAGNOSIS — E03.9 HYPOTHYROIDISM, UNSPECIFIED: ICD-10-CM

## 2020-12-03 DIAGNOSIS — E78.5 HYPERLIPIDEMIA, UNSPECIFIED: ICD-10-CM

## 2020-12-03 LAB — SURGICAL PATHOLOGY STUDY: SIGNIFICANT CHANGE UP

## 2020-12-11 ENCOUNTER — APPOINTMENT (OUTPATIENT)
Dept: GYNECOLOGIC ONCOLOGY | Facility: CLINIC | Age: 70
End: 2020-12-11
Payer: MEDICARE

## 2020-12-11 VITALS
HEIGHT: 61 IN | BODY MASS INDEX: 36.63 KG/M2 | TEMPERATURE: 96.6 F | WEIGHT: 194 LBS | DIASTOLIC BLOOD PRESSURE: 80 MMHG | SYSTOLIC BLOOD PRESSURE: 130 MMHG

## 2020-12-11 PROCEDURE — 99024 POSTOP FOLLOW-UP VISIT: CPT

## 2020-12-11 NOTE — ASSESSMENT
[FreeTextEntry1] : 70 year old patient of Dr. 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

## 2020-12-11 NOTE — REASON FOR VISIT
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  [de-identified] : Pathology signed report  Figo IC (positive cytology on peritoneal fluid).

## 2020-12-21 ENCOUNTER — APPOINTMENT (OUTPATIENT)
Dept: HEMATOLOGY ONCOLOGY | Facility: CLINIC | Age: 70
End: 2020-12-21
Payer: MEDICARE

## 2020-12-21 ENCOUNTER — LABORATORY RESULT (OUTPATIENT)
Age: 70
End: 2020-12-21

## 2020-12-21 VITALS
HEIGHT: 61 IN | TEMPERATURE: 98.3 F | WEIGHT: 194 LBS | BODY MASS INDEX: 36.63 KG/M2 | HEART RATE: 68 BPM | SYSTOLIC BLOOD PRESSURE: 167 MMHG | DIASTOLIC BLOOD PRESSURE: 84 MMHG | RESPIRATION RATE: 16 BRPM

## 2020-12-21 DIAGNOSIS — Z80.0 FAMILY HISTORY OF MALIGNANT NEOPLASM OF DIGESTIVE ORGANS: ICD-10-CM

## 2020-12-21 LAB
ALBUMIN SERPL ELPH-MCNC: 4.2 G/DL
ALP BLD-CCNC: 62 U/L
ALT SERPL-CCNC: 14 U/L
ANION GAP SERPL CALC-SCNC: 12 MMOL/L
AST SERPL-CCNC: 17 U/L
BILIRUB SERPL-MCNC: 1.9 MG/DL
BUN SERPL-MCNC: 18 MG/DL
CALCIUM SERPL-MCNC: 9.7 MG/DL
CHLORIDE SERPL-SCNC: 100 MMOL/L
CO2 SERPL-SCNC: 26 MMOL/L
CREAT SERPL-MCNC: 0.7 MG/DL
GLUCOSE SERPL-MCNC: 171 MG/DL
HCT VFR BLD CALC: 36.1 %
HGB BLD-MCNC: 12 G/DL
IRON SATN MFR SERPL: 30 %
IRON SERPL-MCNC: 92 UG/DL
MCHC RBC-ENTMCNC: 30.7 PG
MCHC RBC-ENTMCNC: 33.2 G/DL
MCV RBC AUTO: 92.3 FL
PLATELET # BLD AUTO: 265 K/UL
PMV BLD: 10.3 FL
POTASSIUM SERPL-SCNC: 4.5 MMOL/L
PROT SERPL-MCNC: 6.8 G/DL
RBC # BLD: 3.91 M/UL
RBC # FLD: 13.5 %
SODIUM SERPL-SCNC: 138 MMOL/L
TIBC SERPL-MCNC: 311 UG/DL
UIBC SERPL-MCNC: 219 UG/DL
WBC # FLD AUTO: 3.58 K/UL

## 2020-12-21 PROCEDURE — 99205 OFFICE O/P NEW HI 60 MIN: CPT

## 2020-12-23 ENCOUNTER — NON-APPOINTMENT (OUTPATIENT)
Age: 70
End: 2020-12-23

## 2020-12-23 ENCOUNTER — APPOINTMENT (OUTPATIENT)
Dept: HEMATOLOGY ONCOLOGY | Facility: CLINIC | Age: 70
End: 2020-12-23

## 2020-12-23 LAB
CANCER AG125 SERPL-ACNC: 19 U/ML
FERRITIN SERPL-MCNC: 35 NG/ML
FOLATE SERPL-MCNC: 19 NG/ML
VIT B12 SERPL-MCNC: 403 PG/ML

## 2020-12-26 ENCOUNTER — OUTPATIENT (OUTPATIENT)
Dept: OUTPATIENT SERVICES | Facility: HOSPITAL | Age: 70
LOS: 1 days | Discharge: HOME | End: 2020-12-26

## 2020-12-26 ENCOUNTER — LABORATORY RESULT (OUTPATIENT)
Age: 70
End: 2020-12-26

## 2020-12-26 DIAGNOSIS — Z11.59 ENCOUNTER FOR SCREENING FOR OTHER VIRAL DISEASES: ICD-10-CM

## 2020-12-26 DIAGNOSIS — Z90.49 ACQUIRED ABSENCE OF OTHER SPECIFIED PARTS OF DIGESTIVE TRACT: Chronic | ICD-10-CM

## 2020-12-26 DIAGNOSIS — Z95.5 PRESENCE OF CORONARY ANGIOPLASTY IMPLANT AND GRAFT: Chronic | ICD-10-CM

## 2020-12-26 NOTE — HISTORY OF PRESENT ILLNESS
[de-identified] : 70 year old female with PMH of CAD, Afib on eliquis, DM, HTN, DLD is here to discuss adjuvant therapy for new diagnosis of ovarian cancer. \par \par  Patient had episode of chest pain in August for which a CTA was performed. An incidental finding revealed a left adnexal mass. She then saw her OBGYN. A pelvic ultrasound was done, as well as a dedicated CT of Abdomen and Pelvis that showed a 8.3 x 8.8 x 7.4 cm left adnexal mass with solid components. 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 - was 15 prior to surgery. \par \par She has recovered well from surgery. She does not have any c/o abdominal pain, weight loss, GOPI. No c/o chest pain/ SOB. \par She is very active and lives with her family at home. \par She takes eliquis and ASA 81 for CAD and Afib \par She was getting B12 inj with PMD for low B12 \par \par H/o Colon cancer in 3 maternal aunts \par Last Mammogram and Colonoscopy- 2-3 months normal \par Radiology- 08/20- CTA- No CTA evidence of aortic dissection or intramural hematoma. 5.3 cm left adnexal cystic lesion. Recommend ultrasound prior to gadolinium enhanced MRI of the pelvis for further evaluation on an outpatient basis.\par \par 11/20- MRI pelvis- Complex cystic lesion lt adnexa with hemorrhagic debris. No pelvic or inguinal adenopathy. \par \par  \par Pathology: Final Diagnosis\par 1. Left tube and ovary (left pelvic mass):\par - Left ovary, high grade papillary serous carcinoma.\par - (The ovarian surface is involved by tumor).\par - Ovarian fibroma / thecoma\par - Fallopian tube, paratubal cyst\par \par 2. Cervix, uterus, right tube and ovary:\par - Uterine cervix, reactive changes\par - Endometrium, mainly atrophic\par - Uterus, leiomyoma\par - Right ovary, no involvement by carcinoma is seen.\par - Fallopian tube, paratubal cyst\par \par 3. Portion of omentum:\par - Omental adipose tissue, no involvement by carcinoma is seen.\par \par 4. Left pelvic nodes:\par - Lymph nodes, no metastatic carcinoma is seen (0/3).\par \par \par PELVIC FLUID:\par - POSITIVE FOR MALIGNANT CELLS.\par - Adenocarcinoma.\par \par \par PROCEDURE: Total hysterectomy and bilateral salpingo-\par oophorectomy, omentum, lymph nodes\par HYSTERECTOMY TYPE: Laparoscopic\par SPECIMEN INTEGRITY OF RIGHT OVARY: Capsule intact\par SPECIMEN INTEGRITY OF LEFT OVARY: Capsule intact\par SPECIMEN INTEGRITY OF RIGHT FALLOPIAN TUBE: Serosa intact\par SPECIMEN INTEGRITY OF LEFT FALLOPIAN TUBE: Serosa intact\par TUMOR SITE: Left ovary\par OVARIAN SURFACE INVOLVEMENT: Present\par FALLOPIAN TUBE SURFACE INVOLVEMENT: Absent\par TUMOR SIZE: Greatest dimension: 3.5 cm (solid /papillary\par component)\par Additional dimensions: 2.6 x 1.4 cm\par HISTOLOGIC TYPE: Serous carcinoma\par HISTOLOGIC GRADE: WHO GRADING SYSTEM: G3: Poorly differentiated\par TWO TIER GRADING SYSTEM: High grade\par IMPLANTS: n/a\par OTHER TISSUE/ORGAN INVOLVEMENT: Not identified\par LARGEST EXTRAPELVIC PERITONEAL FOCUS: n/a\par PERITONEAL / ASCITIC FLUID: Malignant\par TREATMENT EFFECT: No known presurgical therapy\par REGIONAL LYMPH NODES: All lymph nodes negative for tumor cells\par Number of lymph nodes examined: 3\par PATHOLOGIC STAGE CLASSIFICATION\par PRIMARY TUMOR: pT1c3: Malignant cells in ascites or\par peritoneal washings and ovarian surface involvement\par REGIONAL LYMPH NODES: pN0\par FIGO STAGE: IC3: Malignant cells present in the ascites or\par peritoneal washings\par ADDITIONAL PATHOLOGIC FINDINGS: ovarian fibroma/thecoma, uterine\par leiomyoma\par \par \par \par \par

## 2020-12-26 NOTE — ASSESSMENT
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 \par  - was 15 prior to surgery. \par \par \par 1. High grade papillary serous carcinoma of the Lt ovary- [id number],  Capsule intact with +pelvic wash for malignant cells-  pT1c, pN0-  \par \par \par We discussed the diagnosis and staging with patient and the natural history of the disease. Given the 1C stage and high grade serous cancer patient is a candidate for adjuvant chemotherapy. \par \par We would recommend Carboplatin AUC 5 and Taxiol 175 mg/m2 every 3 weeks for 6 cycles. \par We discussed S.E chemo including but not limited to hair loss, N/V/D, low blood counts, increased risk of infections, neuropathy (which could be long lasting), allergic or infusion reactions. \par \par Chemotherapy dose will be adjusted according to patient's tolerance and administered under attending's direct supervision,.\par \par We will begin chemo within a week. \par \par Will check CBC, CMP, . \par Will also check iron studies, ferritin, B12, folate \par Will send genetic testing. \par \par \par RTC 3 weeks after chemo. \par All questions and concerns addressed. \par \par Patient was seen and examined and discussed with  \par \par

## 2020-12-26 NOTE — CONSULT LETTER
[DrJakub  ___] : Dr. SPARROW [Dear  ___] : Dear  [unfilled], [Consult Letter:] : I had the pleasure of evaluating your patient, [unfilled]. [Please see my note below.] : Please see my note below. [Consult Closing:] : Thank you very much for allowing me to participate in the care of this patient.  If you have any questions, please do not hesitate to contact me. [Sincerely,] : Sincerely, [FreeTextEntry3] : Mika Aviles MD\par Professor [employer]\par [place]\par Attending Physician\par [facility]\par [phone]\par \par  [DrJakub ___] : Dr. SPARROW

## 2020-12-26 NOTE — PHYSICAL EXAM
[Fully active, able to carry on all pre-disease performance without restriction] : Status 0 - Fully active, able to carry on all pre-disease performance without restriction [Normal] : affect appropriate [de-identified] : No palpable masses [de-identified] : Laparoscopy scars are healing well . Non tender

## 2020-12-28 ENCOUNTER — LABORATORY RESULT (OUTPATIENT)
Age: 70
End: 2020-12-28

## 2020-12-28 ENCOUNTER — APPOINTMENT (OUTPATIENT)
Dept: INFUSION THERAPY | Facility: CLINIC | Age: 70
End: 2020-12-28

## 2020-12-28 DIAGNOSIS — Z00.00 ENCOUNTER FOR GENERAL ADULT MEDICAL EXAMINATION W/OUT ABNORMAL FINDINGS: ICD-10-CM

## 2020-12-28 LAB
HCT VFR BLD CALC: 34.9 %
HGB BLD-MCNC: 12.1 G/DL
MCHC RBC-ENTMCNC: 31.3 PG
MCHC RBC-ENTMCNC: 34.7 G/DL
MCV RBC AUTO: 90.4 FL
PLATELET # BLD AUTO: 224 K/UL
PMV BLD: 11.3 FL
RBC # BLD: 3.86 M/UL
RBC # FLD: 12.9 %
WBC # FLD AUTO: 4.39 K/UL

## 2020-12-28 RX ORDER — FAMOTIDINE 10 MG/ML
20 INJECTION INTRAVENOUS ONCE
Refills: 0 | Status: COMPLETED | OUTPATIENT
Start: 2020-12-28 | End: 2020-12-28

## 2020-12-28 RX ORDER — PACLITAXEL 6 MG/ML
325 INJECTION, SOLUTION, CONCENTRATE INTRAVENOUS ONCE
Refills: 0 | Status: COMPLETED | OUTPATIENT
Start: 2020-12-28 | End: 2020-12-28

## 2020-12-28 RX ORDER — FOSAPREPITANT DIMEGLUMINE 150 MG/5ML
150 INJECTION, POWDER, LYOPHILIZED, FOR SOLUTION INTRAVENOUS ONCE
Refills: 0 | Status: COMPLETED | OUTPATIENT
Start: 2020-12-28 | End: 2020-12-28

## 2020-12-28 RX ORDER — CARBOPLATIN 50 MG
645 VIAL (EA) INTRAVENOUS ONCE
Refills: 0 | Status: COMPLETED | OUTPATIENT
Start: 2020-12-28 | End: 2020-12-28

## 2020-12-28 RX ORDER — DIPHENHYDRAMINE HCL 50 MG
50 CAPSULE ORAL ONCE
Refills: 0 | Status: COMPLETED | OUTPATIENT
Start: 2020-12-28 | End: 2020-12-28

## 2020-12-28 RX ORDER — DEXAMETHASONE 0.5 MG/5ML
20 ELIXIR ORAL ONCE
Refills: 0 | Status: COMPLETED | OUTPATIENT
Start: 2020-12-28 | End: 2020-12-28

## 2020-12-28 RX ADMIN — PACLITAXEL 184.72 MILLIGRAM(S): 6 INJECTION, SOLUTION, CONCENTRATE INTRAVENOUS at 11:50

## 2020-12-28 RX ADMIN — FOSAPREPITANT DIMEGLUMINE 150 MILLIGRAM(S): 150 INJECTION, POWDER, LYOPHILIZED, FOR SOLUTION INTRAVENOUS at 11:31

## 2020-12-28 RX ADMIN — FAMOTIDINE 20 MILLIGRAM(S): 10 INJECTION INTRAVENOUS at 10:51

## 2020-12-28 RX ADMIN — Medication 20 MILLIGRAM(S): at 10:31

## 2020-12-28 RX ADMIN — Medication 102 MILLIGRAM(S): at 10:51

## 2020-12-28 RX ADMIN — FAMOTIDINE 104 MILLIGRAM(S): 10 INJECTION INTRAVENOUS at 10:31

## 2020-12-28 RX ADMIN — Medication 314.5 MILLIGRAM(S): at 15:17

## 2020-12-28 RX ADMIN — PACLITAXEL 325 MILLIGRAM(S): 6 INJECTION, SOLUTION, CONCENTRATE INTRAVENOUS at 15:17

## 2020-12-28 RX ADMIN — FOSAPREPITANT DIMEGLUMINE 300 MILLIGRAM(S): 150 INJECTION, POWDER, LYOPHILIZED, FOR SOLUTION INTRAVENOUS at 11:11

## 2020-12-28 RX ADMIN — Medication 126 MILLIGRAM(S): at 10:11

## 2020-12-28 RX ADMIN — Medication 645 MILLIGRAM(S): at 15:17

## 2020-12-28 RX ADMIN — Medication 50 MILLIGRAM(S): at 11:11

## 2021-01-04 ENCOUNTER — LABORATORY RESULT (OUTPATIENT)
Age: 71
End: 2021-01-04

## 2021-01-04 ENCOUNTER — APPOINTMENT (OUTPATIENT)
Dept: INFUSION THERAPY | Facility: CLINIC | Age: 71
End: 2021-01-04

## 2021-01-04 RX ORDER — FILGRASTIM 480MCG/1.6
480 VIAL (ML) INJECTION ONCE
Refills: 0 | Status: COMPLETED | OUTPATIENT
Start: 2021-01-04 | End: 2021-01-04

## 2021-01-04 RX ADMIN — Medication 480 MICROGRAM(S): at 11:24

## 2021-01-05 ENCOUNTER — APPOINTMENT (OUTPATIENT)
Dept: INFUSION THERAPY | Facility: CLINIC | Age: 71
End: 2021-01-05

## 2021-01-05 LAB
HCT VFR BLD CALC: 35.1 %
HGB BLD-MCNC: 12.1 G/DL
MCHC RBC-ENTMCNC: 31.1 PG
MCHC RBC-ENTMCNC: 34.5 G/DL
MCV RBC AUTO: 90.2 FL
PLATELET # BLD AUTO: 138 K/UL
PMV BLD: 11.5 FL
RBC # BLD: 3.89 M/UL
RBC # FLD: 12.5 %
WBC # FLD AUTO: 1.91 K/UL

## 2021-01-05 RX ORDER — FILGRASTIM 480MCG/1.6
480 VIAL (ML) INJECTION ONCE
Refills: 0 | Status: COMPLETED | OUTPATIENT
Start: 2021-01-05 | End: 2021-01-05

## 2021-01-05 RX ADMIN — Medication 480 MICROGRAM(S): at 08:40

## 2021-01-06 ENCOUNTER — OUTPATIENT (OUTPATIENT)
Dept: OUTPATIENT SERVICES | Facility: HOSPITAL | Age: 71
LOS: 1 days | Discharge: HOME | End: 2021-01-06

## 2021-01-06 ENCOUNTER — APPOINTMENT (OUTPATIENT)
Dept: INFUSION THERAPY | Facility: CLINIC | Age: 71
End: 2021-01-06

## 2021-01-06 DIAGNOSIS — Z90.49 ACQUIRED ABSENCE OF OTHER SPECIFIED PARTS OF DIGESTIVE TRACT: Chronic | ICD-10-CM

## 2021-01-06 DIAGNOSIS — Z95.5 PRESENCE OF CORONARY ANGIOPLASTY IMPLANT AND GRAFT: Chronic | ICD-10-CM

## 2021-01-06 RX ORDER — FILGRASTIM 480MCG/1.6
480 VIAL (ML) INJECTION ONCE
Refills: 0 | Status: COMPLETED | OUTPATIENT
Start: 2021-01-06 | End: 2021-01-06

## 2021-01-06 RX ADMIN — Medication 480 MICROGRAM(S): at 08:47

## 2021-01-07 ENCOUNTER — APPOINTMENT (OUTPATIENT)
Dept: INFUSION THERAPY | Facility: CLINIC | Age: 71
End: 2021-01-07

## 2021-01-07 ENCOUNTER — APPOINTMENT (OUTPATIENT)
Dept: CARDIOLOGY | Facility: CLINIC | Age: 71
End: 2021-01-07
Payer: MEDICARE

## 2021-01-07 VITALS
WEIGHT: 190 LBS | SYSTOLIC BLOOD PRESSURE: 153 MMHG | BODY MASS INDEX: 35.87 KG/M2 | TEMPERATURE: 97 F | HEIGHT: 61 IN | DIASTOLIC BLOOD PRESSURE: 94 MMHG

## 2021-01-07 DIAGNOSIS — Z51.11 ENCOUNTER FOR ANTINEOPLASTIC CHEMOTHERAPY: ICD-10-CM

## 2021-01-07 DIAGNOSIS — C56.2 MALIGNANT NEOPLASM OF LEFT OVARY: ICD-10-CM

## 2021-01-07 DIAGNOSIS — N94.89 OTHER SPECIFIED CONDITIONS ASSOCIATED WITH FEMALE GENITAL ORGANS AND MENSTRUAL CYCLE: ICD-10-CM

## 2021-01-07 PROCEDURE — 93000 ELECTROCARDIOGRAM COMPLETE: CPT

## 2021-01-07 PROCEDURE — 99072 ADDL SUPL MATRL&STAF TM PHE: CPT

## 2021-01-07 PROCEDURE — 99213 OFFICE O/P EST LOW 20 MIN: CPT

## 2021-01-07 RX ORDER — BLOOD SUGAR DIAGNOSTIC
STRIP MISCELLANEOUS
Qty: 200 | Refills: 0 | Status: COMPLETED | COMMUNITY
Start: 2017-08-21 | End: 2021-01-07

## 2021-01-07 RX ORDER — PSYLLIUM HUSK 0.4 G
CAPSULE ORAL
Refills: 0 | Status: COMPLETED | COMMUNITY
End: 2021-01-07

## 2021-01-07 RX ORDER — BLOOD-GLUCOSE METER
W/DEVICE KIT MISCELLANEOUS
Qty: 1 | Refills: 0 | Status: COMPLETED | COMMUNITY
Start: 2017-08-21 | End: 2021-01-07

## 2021-01-07 RX ORDER — UBIDECARENONE 100 MG
100 CAPSULE ORAL
Refills: 0 | Status: COMPLETED | COMMUNITY
End: 2021-01-07

## 2021-01-07 RX ORDER — LEVOTHYROXINE SODIUM 112 UG/1
112 TABLET ORAL
Refills: 0 | Status: COMPLETED | COMMUNITY
End: 2021-01-07

## 2021-01-07 RX ORDER — CAL/D3/MAG11/ZINC/COP/MANG/BOR 600 MG-800
TABLET ORAL
Refills: 0 | Status: COMPLETED | COMMUNITY
End: 2021-01-07

## 2021-01-07 RX ORDER — FILGRASTIM 480MCG/1.6
480 VIAL (ML) INJECTION ONCE
Refills: 0 | Status: COMPLETED | OUTPATIENT
Start: 2021-01-07 | End: 2021-01-07

## 2021-01-07 RX ADMIN — Medication 480 MICROGRAM(S): at 09:13

## 2021-01-08 ENCOUNTER — LABORATORY RESULT (OUTPATIENT)
Age: 71
End: 2021-01-08

## 2021-01-08 ENCOUNTER — APPOINTMENT (OUTPATIENT)
Dept: INFUSION THERAPY | Facility: CLINIC | Age: 71
End: 2021-01-08

## 2021-01-08 RX ORDER — FILGRASTIM 480MCG/1.6
480 VIAL (ML) INJECTION ONCE
Refills: 0 | Status: COMPLETED | OUTPATIENT
Start: 2021-01-08 | End: 2021-01-08

## 2021-01-08 RX ADMIN — Medication 480 MICROGRAM(S): at 09:17

## 2021-01-10 LAB
HCT VFR BLD CALC: 34.6 %
HGB BLD-MCNC: 12.1 G/DL
MCHC RBC-ENTMCNC: 31.2 PG
MCHC RBC-ENTMCNC: 35 G/DL
MCV RBC AUTO: 89.2 FL
PLATELET # BLD AUTO: 165 K/UL
PMV BLD: 11.6 FL
RBC # BLD: 3.88 M/UL
RBC # FLD: 13 %
WBC # FLD AUTO: 13.15 K/UL

## 2021-01-10 NOTE — CDI QUERY NOTE - NSCDIOTHERTXTBX_GEN_ALL_CORE_HH
Documentation:  ** 12/1/2020 PN DR. Slater: newly diagnosed ovarian cancer clinically confined to the left adnexa. (Stage IC).     ** 11/30/2020 @ 9:11 Cytology report:         PELVIC FLUID:          - POSITIVE FOR MALIGNANT CELLS    ** 11/30/2020 @ 9:45 Pathology report:         PATHOLOGIC STAGE CLASSIFICATION         PRIMARY TUMOR: pT1c3: Malignant cells in ascites or peritoneal washings and ovarian surface involvement         REGIONAL LYMPH NODES: pN0        FIGO STAGE: IC3: Malignant cells present in the ascites or peritoneal washings    Based on the pathology findings, please clarify diagnosis corresponding with report as:  • pelvic fluid/peritoneal fluid positive for malignant cells  • Disagree with Pathology findings (please clarify)  • Other diagnosis (please clarify)

## 2021-01-14 ENCOUNTER — APPOINTMENT (OUTPATIENT)
Dept: HEMATOLOGY ONCOLOGY | Facility: CLINIC | Age: 71
End: 2021-01-14
Payer: MEDICARE

## 2021-01-14 VITALS
HEART RATE: 65 BPM | TEMPERATURE: 97.6 F | BODY MASS INDEX: 36.63 KG/M2 | DIASTOLIC BLOOD PRESSURE: 85 MMHG | RESPIRATION RATE: 16 BRPM | WEIGHT: 194 LBS | HEIGHT: 61 IN | SYSTOLIC BLOOD PRESSURE: 140 MMHG

## 2021-01-14 PROCEDURE — 99215 OFFICE O/P EST HI 40 MIN: CPT

## 2021-01-14 RX ORDER — PEGFILGRASTIM 6 MG/.6ML
INJECTION SUBCUTANEOUS DAILY
Refills: 0 | Status: COMPLETED | COMMUNITY

## 2021-01-16 NOTE — END OF VISIT
[] : Fellow [FreeTextEntry3] : I was physically present for the key portions of the evaluation and management service provided. I agree with the history and physical, and plan which I have reviewed and edited where appropriate.\par

## 2021-01-16 NOTE — HISTORY OF PRESENT ILLNESS
[de-identified] : 70 year old female with PMH of CAD, Afib on eliquis, DM, HTN, DLD is here to discuss adjuvant therapy for new diagnosis of ovarian cancer. \par \par  Patient had episode of chest pain in August for which a CTA was performed. An incidental finding revealed a left adnexal mass. She then saw her OBGYN. A pelvic ultrasound was done, as well as a dedicated CT of Abdomen and Pelvis that showed a 8.3 x 8.8 x 7.4 cm left adnexal mass with solid components. 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 - was 15 prior to surgery. \par \par She has recovered well from surgery. She does not have any c/o abdominal pain, weight loss, GOPI. No c/o chest pain/ SOB. \par She is very active and lives with her family at home. \par She takes eliquis and ASA 81 for CAD and Afib \par She was getting B12 inj with PMD for low B12 \par \par H/o Colon cancer in 3 maternal aunts \par Last Mammogram and Colonoscopy- 2-3 months normal \par Radiology- 08/20- CTA- No CTA evidence of aortic dissection or intramural hematoma. 5.3 cm left adnexal cystic lesion. Recommend ultrasound prior to gadolinium enhanced MRI of the pelvis for further evaluation on an outpatient basis.\par \par 11/20- MRI pelvis- Complex cystic lesion lt adnexa with hemorrhagic debris. No pelvic or inguinal adenopathy. \par \par  \par Pathology: Final Diagnosis\par 1. Left tube and ovary (left pelvic mass):\par - Left ovary, high grade papillary serous carcinoma.\par - (The ovarian surface is involved by tumor).\par - Ovarian fibroma / thecoma\par - Fallopian tube, paratubal cyst\par \par 2. Cervix, uterus, right tube and ovary:\par - Uterine cervix, reactive changes\par - Endometrium, mainly atrophic\par - Uterus, leiomyoma\par - Right ovary, no involvement by carcinoma is seen.\par - Fallopian tube, paratubal cyst\par \par 3. Portion of omentum:\par - Omental adipose tissue, no involvement by carcinoma is seen.\par \par 4. Left pelvic nodes:\par - Lymph nodes, no metastatic carcinoma is seen (0/3).\par \par \par PELVIC FLUID:\par - POSITIVE FOR MALIGNANT CELLS.\par - Adenocarcinoma.\par \par \par PROCEDURE: Total hysterectomy and bilateral salpingo-\par oophorectomy, omentum, lymph nodes\par HYSTERECTOMY TYPE: Laparoscopic\par SPECIMEN INTEGRITY OF RIGHT OVARY: Capsule intact\par SPECIMEN INTEGRITY OF LEFT OVARY: Capsule intact\par SPECIMEN INTEGRITY OF RIGHT FALLOPIAN TUBE: Serosa intact\par SPECIMEN INTEGRITY OF LEFT FALLOPIAN TUBE: Serosa intact\par TUMOR SITE: Left ovary\par OVARIAN SURFACE INVOLVEMENT: Present\par FALLOPIAN TUBE SURFACE INVOLVEMENT: Absent\par TUMOR SIZE: Greatest dimension: 3.5 cm (solid /papillary\par component)\par Additional dimensions: 2.6 x 1.4 cm\par HISTOLOGIC TYPE: Serous carcinoma\par HISTOLOGIC GRADE: WHO GRADING SYSTEM: G3: Poorly differentiated\par TWO TIER GRADING SYSTEM: High grade\par IMPLANTS: n/a\par OTHER TISSUE/ORGAN INVOLVEMENT: Not identified\par LARGEST EXTRAPELVIC PERITONEAL FOCUS: n/a\par PERITONEAL / ASCITIC FLUID: Malignant\par TREATMENT EFFECT: No known presurgical therapy\par REGIONAL LYMPH NODES: All lymph nodes negative for tumor cells\par Number of lymph nodes examined: 3\par PATHOLOGIC STAGE CLASSIFICATION\par PRIMARY TUMOR: pT1c3: Malignant cells in ascites or\par peritoneal washings and ovarian surface involvement\par REGIONAL LYMPH NODES: pN0\par FIGO STAGE: IC3: Malignant cells present in the ascites or\par peritoneal washings\par ADDITIONAL PATHOLOGIC FINDINGS: ovarian fibroma/thecoma, uterine\par leiomyoma\par \par \par \par \par  [de-identified] : 1/14/2021\par Pt is here to f/u for ovarian cancer, S/P Carbo/Taxol cycle 1 and Zarxio x 5 days.\par Pt is doing well today, denies N/V/D, no chest pain, no abdominal pain, no urinary symptoms. \par Pt reports intermittent mild spotting. Pt had 1 episode of nausea after the chemo and resolved after taking Loperamide, + numbness/tingling of bilateral feet on 1/1/21 with relief from pain meds, + chest discomfort on D3 of Zarxio, went to cardio Dr Teran and EKG was normal. Pt had decreased appetite  during the chemo but had improved this week.

## 2021-01-16 NOTE — REVIEW OF SYSTEMS
[Negative] : Heme/Lymph [Muscle Pain] : muscle pain [FreeTextEntry5] : + chest discomfort [FreeTextEntry7] : + nausea  [FreeTextEntry9] : + bone pain [de-identified] : + numbness and tingling of LE

## 2021-01-16 NOTE — PHYSICAL EXAM
[Fully active, able to carry on all pre-disease performance without restriction] : Status 0 - Fully active, able to carry on all pre-disease performance without restriction [Normal] : affect appropriate [de-identified] : No palpable masses [de-identified] : Laparoscopy scars are healing well . Non tender

## 2021-01-18 ENCOUNTER — APPOINTMENT (OUTPATIENT)
Dept: INFUSION THERAPY | Facility: CLINIC | Age: 71
End: 2021-01-18

## 2021-01-18 ENCOUNTER — LABORATORY RESULT (OUTPATIENT)
Age: 71
End: 2021-01-18

## 2021-01-20 LAB
HCT VFR BLD CALC: 32.1 %
HGB BLD-MCNC: 11 G/DL
MCHC RBC-ENTMCNC: 31 PG
MCHC RBC-ENTMCNC: 34.3 G/DL
MCV RBC AUTO: 90.4 FL
PLATELET # BLD AUTO: 217 K/UL
PMV BLD: 11 FL
RBC # BLD: 3.55 M/UL
RBC # FLD: 13.6 %
WBC # FLD AUTO: 2.68 K/UL

## 2021-01-25 ENCOUNTER — APPOINTMENT (OUTPATIENT)
Dept: INFUSION THERAPY | Facility: CLINIC | Age: 71
End: 2021-01-25

## 2021-01-25 ENCOUNTER — LABORATORY RESULT (OUTPATIENT)
Age: 71
End: 2021-01-25

## 2021-01-25 RX ORDER — PACLITAXEL 6 MG/ML
325 INJECTION, SOLUTION, CONCENTRATE INTRAVENOUS ONCE
Refills: 0 | Status: COMPLETED | OUTPATIENT
Start: 2021-01-25 | End: 2021-01-25

## 2021-01-25 RX ORDER — FAMOTIDINE 10 MG/ML
20 INJECTION INTRAVENOUS ONCE
Refills: 0 | Status: COMPLETED | OUTPATIENT
Start: 2021-01-25 | End: 2021-01-25

## 2021-01-25 RX ORDER — CARBOPLATIN 50 MG
645 VIAL (EA) INTRAVENOUS ONCE
Refills: 0 | Status: COMPLETED | OUTPATIENT
Start: 2021-01-25 | End: 2021-01-25

## 2021-01-25 RX ORDER — DEXAMETHASONE 0.5 MG/5ML
20 ELIXIR ORAL ONCE
Refills: 0 | Status: COMPLETED | OUTPATIENT
Start: 2021-01-25 | End: 2021-01-25

## 2021-01-25 RX ORDER — FOSAPREPITANT DIMEGLUMINE 150 MG/5ML
150 INJECTION, POWDER, LYOPHILIZED, FOR SOLUTION INTRAVENOUS ONCE
Refills: 0 | Status: COMPLETED | OUTPATIENT
Start: 2021-01-25 | End: 2021-01-25

## 2021-01-25 RX ORDER — DIPHENHYDRAMINE HCL 50 MG
50 CAPSULE ORAL ONCE
Refills: 0 | Status: COMPLETED | OUTPATIENT
Start: 2021-01-25 | End: 2021-01-25

## 2021-01-25 RX ADMIN — Medication 20 MILLIGRAM(S): at 10:14

## 2021-01-25 RX ADMIN — PACLITAXEL 325 MILLIGRAM(S): 6 INJECTION, SOLUTION, CONCENTRATE INTRAVENOUS at 14:25

## 2021-01-25 RX ADMIN — FOSAPREPITANT DIMEGLUMINE 150 MILLIGRAM(S): 150 INJECTION, POWDER, LYOPHILIZED, FOR SOLUTION INTRAVENOUS at 11:14

## 2021-01-25 RX ADMIN — Medication 50 MILLIGRAM(S): at 10:54

## 2021-01-25 RX ADMIN — PACLITAXEL 184.72 MILLIGRAM(S): 6 INJECTION, SOLUTION, CONCENTRATE INTRAVENOUS at 10:54

## 2021-01-25 RX ADMIN — FOSAPREPITANT DIMEGLUMINE 300 MILLIGRAM(S): 150 INJECTION, POWDER, LYOPHILIZED, FOR SOLUTION INTRAVENOUS at 09:54

## 2021-01-25 RX ADMIN — Medication 314.5 MILLIGRAM(S): at 14:27

## 2021-01-25 RX ADMIN — FAMOTIDINE 104 MILLIGRAM(S): 10 INJECTION INTRAVENOUS at 09:54

## 2021-01-25 RX ADMIN — Medication 126 MILLIGRAM(S): at 09:54

## 2021-01-25 RX ADMIN — Medication 102 MILLIGRAM(S): at 09:54

## 2021-01-25 RX ADMIN — FAMOTIDINE 20 MILLIGRAM(S): 10 INJECTION INTRAVENOUS at 10:34

## 2021-01-26 ENCOUNTER — OUTPATIENT (OUTPATIENT)
Dept: OUTPATIENT SERVICES | Facility: HOSPITAL | Age: 71
LOS: 1 days | Discharge: HOME | End: 2021-01-26

## 2021-01-26 ENCOUNTER — APPOINTMENT (OUTPATIENT)
Dept: INFUSION THERAPY | Facility: CLINIC | Age: 71
End: 2021-01-26

## 2021-01-26 ENCOUNTER — LABORATORY RESULT (OUTPATIENT)
Age: 71
End: 2021-01-26

## 2021-01-26 DIAGNOSIS — Z11.59 ENCOUNTER FOR SCREENING FOR OTHER VIRAL DISEASES: ICD-10-CM

## 2021-01-26 DIAGNOSIS — Z90.49 ACQUIRED ABSENCE OF OTHER SPECIFIED PARTS OF DIGESTIVE TRACT: Chronic | ICD-10-CM

## 2021-01-26 DIAGNOSIS — Z95.5 PRESENCE OF CORONARY ANGIOPLASTY IMPLANT AND GRAFT: Chronic | ICD-10-CM

## 2021-01-26 LAB
ALBUMIN SERPL ELPH-MCNC: 3.9 G/DL
ALP BLD-CCNC: 65 U/L
ALT SERPL-CCNC: 14 U/L
ANION GAP SERPL CALC-SCNC: 8 MMOL/L
AST SERPL-CCNC: 15 U/L
BILIRUB SERPL-MCNC: 1.2 MG/DL
BUN SERPL-MCNC: 19 MG/DL
CALCIUM SERPL-MCNC: 9.5 MG/DL
CHLORIDE SERPL-SCNC: 103 MMOL/L
CO2 SERPL-SCNC: 28 MMOL/L
CREAT SERPL-MCNC: 0.7 MG/DL
GLUCOSE SERPL-MCNC: 171 MG/DL
HCT VFR BLD CALC: 32.1 %
HGB BLD-MCNC: 11.3 G/DL
MCHC RBC-ENTMCNC: 31.7 PG
MCHC RBC-ENTMCNC: 35.2 G/DL
MCV RBC AUTO: 90.2 FL
PLATELET # BLD AUTO: 228 K/UL
PMV BLD: 10.9 FL
POTASSIUM SERPL-SCNC: 4.2 MMOL/L
PROT SERPL-MCNC: 6.9 G/DL
RBC # BLD: 3.56 M/UL
RBC # FLD: 13.8 %
SODIUM SERPL-SCNC: 139 MMOL/L
WBC # FLD AUTO: 3.22 K/UL

## 2021-01-26 RX ORDER — PEGFILGRASTIM-CBQV 6 MG/.6ML
6 INJECTION, SOLUTION SUBCUTANEOUS ONCE
Refills: 0 | Status: COMPLETED | OUTPATIENT
Start: 2021-01-26 | End: 2021-01-26

## 2021-01-26 RX ADMIN — PEGFILGRASTIM-CBQV 6 MILLIGRAM(S): 6 INJECTION, SOLUTION SUBCUTANEOUS at 15:02

## 2021-02-05 ENCOUNTER — LABORATORY RESULT (OUTPATIENT)
Age: 71
End: 2021-02-05

## 2021-02-05 ENCOUNTER — APPOINTMENT (OUTPATIENT)
Dept: HEMATOLOGY ONCOLOGY | Facility: CLINIC | Age: 71
End: 2021-02-05

## 2021-02-05 LAB
HCT VFR BLD CALC: 30.8 %
HGB BLD-MCNC: 10.4 G/DL
MCHC RBC-ENTMCNC: 31 PG
MCHC RBC-ENTMCNC: 33.8 G/DL
MCV RBC AUTO: 91.9 FL
PLATELET # BLD AUTO: 181 K/UL
PMV BLD: 11.2 FL
RBC # BLD: 3.35 M/UL
RBC # FLD: 14.6 %
WBC # FLD AUTO: 11.67 K/UL

## 2021-02-08 ENCOUNTER — LABORATORY RESULT (OUTPATIENT)
Age: 71
End: 2021-02-08

## 2021-02-08 ENCOUNTER — APPOINTMENT (OUTPATIENT)
Dept: HEMATOLOGY ONCOLOGY | Facility: CLINIC | Age: 71
End: 2021-02-08
Payer: MEDICARE

## 2021-02-08 VITALS
TEMPERATURE: 97.6 F | DIASTOLIC BLOOD PRESSURE: 82 MMHG | HEART RATE: 68 BPM | SYSTOLIC BLOOD PRESSURE: 153 MMHG | BODY MASS INDEX: 36.63 KG/M2 | WEIGHT: 194 LBS | HEIGHT: 61 IN

## 2021-02-08 LAB
HCT VFR BLD CALC: 30.6 %
HGB BLD-MCNC: 10.5 G/DL
MCHC RBC-ENTMCNC: 31.4 PG
MCHC RBC-ENTMCNC: 34.3 G/DL
MCV RBC AUTO: 91.6 FL
PLATELET # BLD AUTO: 190 K/UL
PMV BLD: 9.9 FL
RBC # BLD: 3.34 M/UL
RBC # FLD: 14.9 %
WBC # FLD AUTO: 7.77 K/UL

## 2021-02-08 PROCEDURE — 99215 OFFICE O/P EST HI 40 MIN: CPT

## 2021-02-12 NOTE — PHYSICAL EXAM
[Fully active, able to carry on all pre-disease performance without restriction] : Status 0 - Fully active, able to carry on all pre-disease performance without restriction [Normal] : affect appropriate [de-identified] : No palpable masses [de-identified] : Laparoscopy scars are healing well . Non tender

## 2021-02-12 NOTE — HISTORY OF PRESENT ILLNESS
[de-identified] : 70 year old female with PMH of CAD, Afib on eliquis, DM, HTN, DLD is here to discuss adjuvant therapy for new diagnosis of ovarian cancer. \par \par  Patient had episode of chest pain in August for which a CTA was performed. An incidental finding revealed a left adnexal mass. She then saw her OBGYN. A pelvic ultrasound was done, as well as a dedicated CT of Abdomen and Pelvis that showed a 8.3 x 8.8 x 7.4 cm left adnexal mass with solid components. 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 - was 15 prior to surgery. \par \par She has recovered well from surgery. She does not have any c/o abdominal pain, weight loss, GOPI. No c/o chest pain/ SOB. \par She is very active and lives with her family at home. \par She takes eliquis and ASA 81 for CAD and Afib \par She was getting B12 inj with PMD for low B12 \par \par H/o Colon cancer in 3 maternal aunts \par Last Mammogram and Colonoscopy- 2-3 months normal \par Radiology- 08/20- CTA- No CTA evidence of aortic dissection or intramural hematoma. 5.3 cm left adnexal cystic lesion. Recommend ultrasound prior to gadolinium enhanced MRI of the pelvis for further evaluation on an outpatient basis.\par \par 11/20- MRI pelvis- Complex cystic lesion lt adnexa with hemorrhagic debris. No pelvic or inguinal adenopathy. \par \par  \par Pathology: Final Diagnosis\par 1. Left tube and ovary (left pelvic mass):\par - Left ovary, high grade papillary serous carcinoma.\par - (The ovarian surface is involved by tumor).\par - Ovarian fibroma / thecoma\par - Fallopian tube, paratubal cyst\par \par 2. Cervix, uterus, right tube and ovary:\par - Uterine cervix, reactive changes\par - Endometrium, mainly atrophic\par - Uterus, leiomyoma\par - Right ovary, no involvement by carcinoma is seen.\par - Fallopian tube, paratubal cyst\par \par 3. Portion of omentum:\par - Omental adipose tissue, no involvement by carcinoma is seen.\par \par 4. Left pelvic nodes:\par - Lymph nodes, no metastatic carcinoma is seen (0/3).\par \par \par PELVIC FLUID:\par - POSITIVE FOR MALIGNANT CELLS.\par - Adenocarcinoma.\par \par \par PROCEDURE: Total hysterectomy and bilateral salpingo-\par oophorectomy, omentum, lymph nodes\par HYSTERECTOMY TYPE: Laparoscopic\par SPECIMEN INTEGRITY OF RIGHT OVARY: Capsule intact\par SPECIMEN INTEGRITY OF LEFT OVARY: Capsule intact\par SPECIMEN INTEGRITY OF RIGHT FALLOPIAN TUBE: Serosa intact\par SPECIMEN INTEGRITY OF LEFT FALLOPIAN TUBE: Serosa intact\par TUMOR SITE: Left ovary\par OVARIAN SURFACE INVOLVEMENT: Present\par FALLOPIAN TUBE SURFACE INVOLVEMENT: Absent\par TUMOR SIZE: Greatest dimension: 3.5 cm (solid /papillary\par component)\par Additional dimensions: 2.6 x 1.4 cm\par HISTOLOGIC TYPE: Serous carcinoma\par HISTOLOGIC GRADE: WHO GRADING SYSTEM: G3: Poorly differentiated\par TWO TIER GRADING SYSTEM: High grade\par IMPLANTS: n/a\par OTHER TISSUE/ORGAN INVOLVEMENT: Not identified\par LARGEST EXTRAPELVIC PERITONEAL FOCUS: n/a\par PERITONEAL / ASCITIC FLUID: Malignant\par TREATMENT EFFECT: No known presurgical therapy\par REGIONAL LYMPH NODES: All lymph nodes negative for tumor cells\par Number of lymph nodes examined: 3\par PATHOLOGIC STAGE CLASSIFICATION\par PRIMARY TUMOR: pT1c3: Malignant cells in ascites or\par peritoneal washings and ovarian surface involvement\par REGIONAL LYMPH NODES: pN0\par FIGO STAGE: IC3: Malignant cells present in the ascites or\par peritoneal washings\par ADDITIONAL PATHOLOGIC FINDINGS: ovarian fibroma/thecoma, uterine\par leiomyoma\par \par \par \par \par  [de-identified] : 1/14/2021\par Pt is here to f/u for ovarian cancer, S/P Carbo/Taxol cycle 1 and Zarxio x 5 days.\par Pt is doing well today, denies N/V/D, no chest pain, no abdominal pain, no urinary symptoms. \par Pt reports intermittent mild spotting. Pt had 1 episode of nausea after the chemo and resolved after taking Loperamide, + numbness/tingling of bilateral feet on 1/1/21 with relief from pain meds, + chest discomfort on D3 of Zarxio, went to cardio Dr Teran and EKG was normal. Pt had decreased appetite  during the chemo but had improved this week. \par \par 2/8/21\par pt is here for follow up.\par S/p 2 cycles of chemo.\par She had developed mild neuropathy and was started on Gabapentin.\par Now on Gabapentin, she has improvement. I discussed possible dose reduction however pt does not want to reduce the dose.\par No abd pain, N, V, D

## 2021-02-12 NOTE — ASSESSMENT
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 \par \par BRCA NEG\par  - was 15 prior to surgery. \par \par \par 1. High grade papillary serous carcinoma of the Lt ovary- [id number],  Capsule intact with +pelvic wash for malignant cells-  pT1c, pN0-  \par \par \par We discussed the diagnosis and staging with patient and the natural history of the disease. Given the 1C stage and high grade serous cancer patient is a candidate for adjuvant chemotherapy. \par \par We discussed Carboplatin AUC 5 and Taxiol 175 mg/m2 every 3 weeks for 6 cycles.  She is s/p 2 cycles with development of mild neuropathy. On gabapentin her symptoms are controlled. Proceed with Cycle # 3 today with Neulasta.\par We discussed S.E chemo including but not limited to hair loss, N/V/D, low blood counts, increased risk of infections, neuropathy (which could be long lasting), allergic or infusion reactions. \par \par The chemotherapy dose was adjusted according to pt's height, weight, labs and anticipated tolerance.\par The high risk of complications and complexity associated with antineoplastic therapy administration has been explained to the pt and family.\par Chemotherapy will be administered under my direct supervision\par \par \par \par RTC 3 weeks after chemo. \par All questions and concerns addressed. \par \par \par \par

## 2021-02-12 NOTE — REVIEW OF SYSTEMS
[Muscle Pain] : muscle pain [Negative] : Heme/Lymph [FreeTextEntry5] : + chest discomfort [FreeTextEntry7] : + nausea  [FreeTextEntry9] : + bone pain [de-identified] : + numbness and tingling of LE

## 2021-02-15 NOTE — PHYSICAL EXAM
[General Appearance - Well Developed] : well developed [Normal Appearance] : normal appearance [Well Groomed] : well groomed [General Appearance - Well Nourished] : well nourished [No Deformities] : no deformities [General Appearance - In No Acute Distress] : no acute distress [Normal Conjunctiva] : the conjunctiva exhibited no abnormalities [Eyelids - No Xanthelasma] : the eyelids demonstrated no xanthelasmas [Normal Oral Mucosa] : normal oral mucosa [No Oral Pallor] : no oral pallor [No Oral Cyanosis] : no oral cyanosis [Normal Jugular Venous A Waves Present] : normal jugular venous A waves present [Normal Jugular Venous V Waves Present] : normal jugular venous V waves present [No Jugular Venous Carrizales A Waves] : no jugular venous carrizales A waves [Respiration, Rhythm And Depth] : normal respiratory rhythm and effort [Exaggerated Use Of Accessory Muscles For Inspiration] : no accessory muscle use [Auscultation Breath Sounds / Voice Sounds] : lungs were clear to auscultation bilaterally [Heart Rate And Rhythm] : heart rate and rhythm were normal [Heart Sounds] : normal S1 and S2 [Murmurs] : no murmurs present [Abnormal Walk] : normal gait [Gait - Sufficient For Exercise Testing] : the gait was sufficient for exercise testing [Cyanosis, Localized] : no localized cyanosis [Nail Clubbing] : no clubbing of the fingernails [Petechial Hemorrhages (___cm)] : no petechial hemorrhages [Skin Color & Pigmentation] : normal skin color and pigmentation [] : no rash [No Venous Stasis] : no venous stasis [No Skin Ulcers] : no skin ulcer [Skin Lesions] : no skin lesions [No Xanthoma] : no  xanthoma was observed [Oriented To Time, Place, And Person] : oriented to person, place, and time [Affect] : the affect was normal [Mood] : the mood was normal [No Anxiety] : not feeling anxious

## 2021-02-15 NOTE — HISTORY OF PRESENT ILLNESS
[FreeTextEntry1] : I had a pleasure of seeing Ms. DORMAN for follow-up consultation for . She was previously being followed by Dr. Forman.\par \par Ms. DORMAN is a 70 year-year old female with history of CAD, Paroxysmal AF, DM, HTN, DL, hypothyroidism, ovarian Ca is here for routine f-up.\par \par Denies chest pain, shortness of breath, palpitation, dizziness or LOC except noted above.\par \par EKG: SR@ 79/min, QTc 430 ms\par Cardio: Dr. Edouard\par \par

## 2021-02-15 NOTE — ASSESSMENT
[FreeTextEntry1] : ## Paroxysmal AF\par \par - CHADSVASC: 5+ (Age, Gender, CAD, HTN, DM). On eliquis\par - Rhythm control with sotalol. QTc stable.\par - CBC, BMP with hem-onc\par - RTC in 6 months.

## 2021-02-16 ENCOUNTER — APPOINTMENT (OUTPATIENT)
Dept: INFUSION THERAPY | Facility: CLINIC | Age: 71
End: 2021-02-16

## 2021-02-16 ENCOUNTER — APPOINTMENT (OUTPATIENT)
Dept: HEMATOLOGY ONCOLOGY | Facility: CLINIC | Age: 71
End: 2021-02-16

## 2021-02-16 ENCOUNTER — LABORATORY RESULT (OUTPATIENT)
Age: 71
End: 2021-02-16

## 2021-02-16 VITALS
HEART RATE: 77 BPM | DIASTOLIC BLOOD PRESSURE: 70 MMHG | TEMPERATURE: 97.2 F | RESPIRATION RATE: 18 BRPM | SYSTOLIC BLOOD PRESSURE: 153 MMHG

## 2021-02-16 RX ORDER — DIPHENHYDRAMINE HCL 50 MG
50 CAPSULE ORAL ONCE
Refills: 0 | Status: COMPLETED | OUTPATIENT
Start: 2021-02-16 | End: 2021-02-16

## 2021-02-16 RX ORDER — FOSAPREPITANT DIMEGLUMINE 150 MG/5ML
150 INJECTION, POWDER, LYOPHILIZED, FOR SOLUTION INTRAVENOUS ONCE
Refills: 0 | Status: COMPLETED | OUTPATIENT
Start: 2021-02-16 | End: 2021-02-16

## 2021-02-16 RX ORDER — PACLITAXEL 6 MG/ML
325 INJECTION, SOLUTION, CONCENTRATE INTRAVENOUS ONCE
Refills: 0 | Status: COMPLETED | OUTPATIENT
Start: 2021-02-16 | End: 2021-02-16

## 2021-02-16 RX ORDER — FAMOTIDINE 10 MG/ML
20 INJECTION INTRAVENOUS ONCE
Refills: 0 | Status: COMPLETED | OUTPATIENT
Start: 2021-02-16 | End: 2021-02-16

## 2021-02-16 RX ORDER — CARBOPLATIN 50 MG
645 VIAL (EA) INTRAVENOUS ONCE
Refills: 0 | Status: COMPLETED | OUTPATIENT
Start: 2021-02-16 | End: 2021-02-16

## 2021-02-16 RX ORDER — DEXAMETHASONE 0.5 MG/5ML
20 ELIXIR ORAL ONCE
Refills: 0 | Status: COMPLETED | OUTPATIENT
Start: 2021-02-16 | End: 2021-02-16

## 2021-02-16 RX ADMIN — PACLITAXEL 184.72 MILLIGRAM(S): 6 INJECTION, SOLUTION, CONCENTRATE INTRAVENOUS at 14:01

## 2021-02-16 RX ADMIN — Medication 314.5 MILLIGRAM(S): at 14:01

## 2021-02-16 RX ADMIN — Medication 50 MILLIGRAM(S): at 14:00

## 2021-02-16 RX ADMIN — Medication 645 MILLIGRAM(S): at 18:00

## 2021-02-16 RX ADMIN — Medication 20 MILLIGRAM(S): at 13:18

## 2021-02-16 RX ADMIN — Medication 102 MILLIGRAM(S): at 13:40

## 2021-02-16 RX ADMIN — FAMOTIDINE 20 MILLIGRAM(S): 10 INJECTION INTRAVENOUS at 13:40

## 2021-02-16 RX ADMIN — FOSAPREPITANT DIMEGLUMINE 300 MILLIGRAM(S): 150 INJECTION, POWDER, LYOPHILIZED, FOR SOLUTION INTRAVENOUS at 14:00

## 2021-02-16 RX ADMIN — FOSAPREPITANT DIMEGLUMINE 150 MILLIGRAM(S): 150 INJECTION, POWDER, LYOPHILIZED, FOR SOLUTION INTRAVENOUS at 14:15

## 2021-02-16 RX ADMIN — PACLITAXEL 325 MILLIGRAM(S): 6 INJECTION, SOLUTION, CONCENTRATE INTRAVENOUS at 17:05

## 2021-02-16 RX ADMIN — FAMOTIDINE 104 MILLIGRAM(S): 10 INJECTION INTRAVENOUS at 13:20

## 2021-02-16 RX ADMIN — Medication 126 MILLIGRAM(S): at 12:58

## 2021-02-17 ENCOUNTER — NON-APPOINTMENT (OUTPATIENT)
Age: 71
End: 2021-02-17

## 2021-02-17 ENCOUNTER — APPOINTMENT (OUTPATIENT)
Dept: INFUSION THERAPY | Facility: CLINIC | Age: 71
End: 2021-02-17

## 2021-02-17 LAB
ALBUMIN SERPL ELPH-MCNC: 4 G/DL
ALP BLD-CCNC: 77 U/L
ALT SERPL-CCNC: 15 U/L
ANION GAP SERPL CALC-SCNC: 10 MMOL/L
AST SERPL-CCNC: 19 U/L
BILIRUB SERPL-MCNC: 1.2 MG/DL
BUN SERPL-MCNC: 26 MG/DL
CALCIUM SERPL-MCNC: 9.3 MG/DL
CHLORIDE SERPL-SCNC: 102 MMOL/L
CO2 SERPL-SCNC: 25 MMOL/L
CREAT SERPL-MCNC: 0.8 MG/DL
GLUCOSE SERPL-MCNC: 190 MG/DL
HCT VFR BLD CALC: 31.6 %
HGB BLD-MCNC: 10.8 G/DL
MCHC RBC-ENTMCNC: 32 PG
MCHC RBC-ENTMCNC: 34.2 G/DL
MCV RBC AUTO: 93.5 FL
PLATELET # BLD AUTO: 282 K/UL
PMV BLD: 11.1 FL
POTASSIUM SERPL-SCNC: 4.6 MMOL/L
PROT SERPL-MCNC: 7.1 G/DL
RBC # BLD: 3.38 M/UL
RBC # FLD: 15.9 %
SODIUM SERPL-SCNC: 137 MMOL/L
WBC # FLD AUTO: 3.91 K/UL

## 2021-02-17 RX ORDER — PEGFILGRASTIM-CBQV 6 MG/.6ML
6 INJECTION, SOLUTION SUBCUTANEOUS ONCE
Refills: 0 | Status: COMPLETED | OUTPATIENT
Start: 2021-02-17 | End: 2021-02-17

## 2021-02-17 RX ADMIN — PEGFILGRASTIM-CBQV 6 MILLIGRAM(S): 6 INJECTION, SOLUTION SUBCUTANEOUS at 16:43

## 2021-02-19 ENCOUNTER — APPOINTMENT (OUTPATIENT)
Dept: GYNECOLOGIC ONCOLOGY | Facility: CLINIC | Age: 71
End: 2021-02-19

## 2021-02-23 ENCOUNTER — OUTPATIENT (OUTPATIENT)
Dept: OUTPATIENT SERVICES | Facility: HOSPITAL | Age: 71
LOS: 1 days | Discharge: HOME | End: 2021-02-23

## 2021-02-23 ENCOUNTER — LABORATORY RESULT (OUTPATIENT)
Age: 71
End: 2021-02-23

## 2021-02-23 DIAGNOSIS — Z90.49 ACQUIRED ABSENCE OF OTHER SPECIFIED PARTS OF DIGESTIVE TRACT: Chronic | ICD-10-CM

## 2021-02-23 DIAGNOSIS — Z11.59 ENCOUNTER FOR SCREENING FOR OTHER VIRAL DISEASES: ICD-10-CM

## 2021-02-23 DIAGNOSIS — Z95.5 PRESENCE OF CORONARY ANGIOPLASTY IMPLANT AND GRAFT: Chronic | ICD-10-CM

## 2021-02-24 ENCOUNTER — APPOINTMENT (OUTPATIENT)
Dept: CARDIOLOGY | Facility: CLINIC | Age: 71
End: 2021-02-24
Payer: MEDICARE

## 2021-02-24 VITALS
WEIGHT: 190 LBS | HEIGHT: 55 IN | HEART RATE: 86 BPM | BODY MASS INDEX: 43.97 KG/M2 | DIASTOLIC BLOOD PRESSURE: 80 MMHG | SYSTOLIC BLOOD PRESSURE: 132 MMHG | TEMPERATURE: 97.1 F

## 2021-02-24 PROCEDURE — 99213 OFFICE O/P EST LOW 20 MIN: CPT

## 2021-02-24 PROCEDURE — 99072 ADDL SUPL MATRL&STAF TM PHE: CPT

## 2021-02-24 PROCEDURE — 93000 ELECTROCARDIOGRAM COMPLETE: CPT

## 2021-03-04 NOTE — PAST MEDICAL HISTORY
[Menarche Age ____] : age at menarche was [unfilled] [Total Preg ___] : G[unfilled] [Live Births ___] : P[unfilled]  [Full Term ___] : Full Term: [unfilled] [Living ___] : Living: [unfilled]

## 2021-03-05 ENCOUNTER — APPOINTMENT (OUTPATIENT)
Dept: GYNECOLOGIC ONCOLOGY | Facility: CLINIC | Age: 71
End: 2021-03-05
Payer: MEDICARE

## 2021-03-05 VITALS
WEIGHT: 194 LBS | SYSTOLIC BLOOD PRESSURE: 140 MMHG | TEMPERATURE: 96.5 F | BODY MASS INDEX: 36.63 KG/M2 | DIASTOLIC BLOOD PRESSURE: 80 MMHG | HEIGHT: 61 IN

## 2021-03-05 PROCEDURE — 99213 OFFICE O/P EST LOW 20 MIN: CPT

## 2021-03-05 PROCEDURE — 99072 ADDL SUPL MATRL&STAF TM PHE: CPT

## 2021-03-05 NOTE — ASSESSMENT
[FreeTextEntry1] : 70 year old patient of Dr. 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   Pathology reports Figo 1C3 (positive cytology). She is under the care of Dr. Aviles for chemotherapy.

## 2021-03-05 NOTE — HISTORY OF PRESENT ILLNESS
[FreeTextEntry1] : 70 year old patient of Dr. 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   Pathology reports Figo 1C3 (positive cytology). She is under the care of Dr. Aviles for chemotherapy.   She deferred the 3rd treatment due to neuorpathy but is scheduled to continue with treatment.

## 2021-03-08 ENCOUNTER — APPOINTMENT (OUTPATIENT)
Dept: INFUSION THERAPY | Facility: CLINIC | Age: 71
End: 2021-03-08
Payer: MEDICARE

## 2021-03-08 ENCOUNTER — LABORATORY RESULT (OUTPATIENT)
Age: 71
End: 2021-03-08

## 2021-03-08 ENCOUNTER — APPOINTMENT (OUTPATIENT)
Dept: HEMATOLOGY ONCOLOGY | Facility: CLINIC | Age: 71
End: 2021-03-08

## 2021-03-08 ENCOUNTER — APPOINTMENT (OUTPATIENT)
Dept: HEMATOLOGY ONCOLOGY | Facility: CLINIC | Age: 71
End: 2021-03-08
Payer: MEDICARE

## 2021-03-08 VITALS
SYSTOLIC BLOOD PRESSURE: 132 MMHG | HEIGHT: 61 IN | TEMPERATURE: 97.3 F | HEART RATE: 88 BPM | WEIGHT: 197 LBS | DIASTOLIC BLOOD PRESSURE: 60 MMHG | BODY MASS INDEX: 37.19 KG/M2

## 2021-03-08 LAB
HCT VFR BLD CALC: 30.1 %
HGB BLD-MCNC: 10.2 G/DL
MCHC RBC-ENTMCNC: 32.1 PG
MCHC RBC-ENTMCNC: 33.9 G/DL
MCV RBC AUTO: 94.7 FL
PLATELET # BLD AUTO: 234 K/UL
PMV BLD: 10 FL
RBC # BLD: 3.18 M/UL
RBC # FLD: 17.2 %
WBC # FLD AUTO: 2.84 K/UL

## 2021-03-08 PROCEDURE — 99215 OFFICE O/P EST HI 40 MIN: CPT

## 2021-03-08 RX ORDER — CARBOPLATIN 50 MG
645 VIAL (EA) INTRAVENOUS ONCE
Refills: 0 | Status: COMPLETED | OUTPATIENT
Start: 2021-03-08 | End: 2021-03-08

## 2021-03-08 RX ORDER — DEXAMETHASONE 0.5 MG/5ML
20 ELIXIR ORAL ONCE
Refills: 0 | Status: COMPLETED | OUTPATIENT
Start: 2021-03-08 | End: 2021-03-08

## 2021-03-08 RX ORDER — PACLITAXEL 6 MG/ML
325 INJECTION, SOLUTION, CONCENTRATE INTRAVENOUS ONCE
Refills: 0 | Status: COMPLETED | OUTPATIENT
Start: 2021-03-08 | End: 2021-03-08

## 2021-03-08 RX ORDER — FOSAPREPITANT DIMEGLUMINE 150 MG/5ML
150 INJECTION, POWDER, LYOPHILIZED, FOR SOLUTION INTRAVENOUS ONCE
Refills: 0 | Status: COMPLETED | OUTPATIENT
Start: 2021-03-08 | End: 2021-03-08

## 2021-03-08 RX ORDER — FAMOTIDINE 10 MG/ML
20 INJECTION INTRAVENOUS ONCE
Refills: 0 | Status: COMPLETED | OUTPATIENT
Start: 2021-03-08 | End: 2021-03-08

## 2021-03-08 RX ORDER — DIPHENHYDRAMINE HCL 50 MG
50 CAPSULE ORAL ONCE
Refills: 0 | Status: COMPLETED | OUTPATIENT
Start: 2021-03-08 | End: 2021-03-08

## 2021-03-08 RX ADMIN — Medication 314.5 MILLIGRAM(S): at 13:53

## 2021-03-08 RX ADMIN — Medication 126 MILLIGRAM(S): at 12:39

## 2021-03-08 RX ADMIN — PACLITAXEL 184.72 MILLIGRAM(S): 6 INJECTION, SOLUTION, CONCENTRATE INTRAVENOUS at 13:54

## 2021-03-08 RX ADMIN — Medication 102 MILLIGRAM(S): at 12:38

## 2021-03-08 RX ADMIN — FAMOTIDINE 104 MILLIGRAM(S): 10 INJECTION INTRAVENOUS at 12:39

## 2021-03-08 RX ADMIN — FOSAPREPITANT DIMEGLUMINE 300 MILLIGRAM(S): 150 INJECTION, POWDER, LYOPHILIZED, FOR SOLUTION INTRAVENOUS at 12:39

## 2021-03-08 NOTE — END OF VISIT
[] : Fellow [FreeTextEntry3] : She is s/p 3 cycles with development of mild neuropathy. On gabapentin her symptoms are controlled. Therefore will continue with same dose.  Proceed with Cycle # 4 today with fulphila  on day 2

## 2021-03-08 NOTE — PHYSICAL EXAM
[Fully active, able to carry on all pre-disease performance without restriction] : Status 0 - Fully active, able to carry on all pre-disease performance without restriction [Normal] : affect appropriate [de-identified] : No palpable masses [de-identified] : Laparoscopy scars are healing well . Non tender

## 2021-03-08 NOTE — HISTORY OF PRESENT ILLNESS
[de-identified] : 70 year old female with PMH of CAD, Afib on eliquis, DM, HTN, DLD is here to discuss adjuvant therapy for new diagnosis of ovarian cancer. \par \par  Patient had episode of chest pain in August for which a CTA was performed. An incidental finding revealed a left adnexal mass. She then saw her OBGYN. A pelvic ultrasound was done, as well as a dedicated CT of Abdomen and Pelvis that showed a 8.3 x 8.8 x 7.4 cm left adnexal mass with solid components. 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 - was 15 prior to surgery. \par \par She has recovered well from surgery. She does not have any c/o abdominal pain, weight loss, GOPI. No c/o chest pain/ SOB. \par She is very active and lives with her family at home. \par She takes eliquis and ASA 81 for CAD and Afib \par She was getting B12 inj with PMD for low B12 \par \par H/o Colon cancer in 3 maternal aunts \par Last Mammogram and Colonoscopy- 2-3 months normal \par Radiology- 08/20- CTA- No CTA evidence of aortic dissection or intramural hematoma. 5.3 cm left adnexal cystic lesion. Recommend ultrasound prior to gadolinium enhanced MRI of the pelvis for further evaluation on an outpatient basis.\par \par 11/20- MRI pelvis- Complex cystic lesion lt adnexa with hemorrhagic debris. No pelvic or inguinal adenopathy. \par \par  \par Pathology: Final Diagnosis\par 1. Left tube and ovary (left pelvic mass):\par - Left ovary, high grade papillary serous carcinoma.\par - (The ovarian surface is involved by tumor).\par - Ovarian fibroma / thecoma\par - Fallopian tube, paratubal cyst\par \par 2. Cervix, uterus, right tube and ovary:\par - Uterine cervix, reactive changes\par - Endometrium, mainly atrophic\par - Uterus, leiomyoma\par - Right ovary, no involvement by carcinoma is seen.\par - Fallopian tube, paratubal cyst\par \par 3. Portion of omentum:\par - Omental adipose tissue, no involvement by carcinoma is seen.\par \par 4. Left pelvic nodes:\par - Lymph nodes, no metastatic carcinoma is seen (0/3).\par \par \par PELVIC FLUID:\par - POSITIVE FOR MALIGNANT CELLS.\par - Adenocarcinoma.\par \par \par PROCEDURE: Total hysterectomy and bilateral salpingo-\par oophorectomy, omentum, lymph nodes\par HYSTERECTOMY TYPE: Laparoscopic\par SPECIMEN INTEGRITY OF RIGHT OVARY: Capsule intact\par SPECIMEN INTEGRITY OF LEFT OVARY: Capsule intact\par SPECIMEN INTEGRITY OF RIGHT FALLOPIAN TUBE: Serosa intact\par SPECIMEN INTEGRITY OF LEFT FALLOPIAN TUBE: Serosa intact\par TUMOR SITE: Left ovary\par OVARIAN SURFACE INVOLVEMENT: Present\par FALLOPIAN TUBE SURFACE INVOLVEMENT: Absent\par TUMOR SIZE: Greatest dimension: 3.5 cm (solid /papillary\par component)\par Additional dimensions: 2.6 x 1.4 cm\par HISTOLOGIC TYPE: Serous carcinoma\par HISTOLOGIC GRADE: WHO GRADING SYSTEM: G3: Poorly differentiated\par TWO TIER GRADING SYSTEM: High grade\par IMPLANTS: n/a\par OTHER TISSUE/ORGAN INVOLVEMENT: Not identified\par LARGEST EXTRAPELVIC PERITONEAL FOCUS: n/a\par PERITONEAL / ASCITIC FLUID: Malignant\par TREATMENT EFFECT: No known presurgical therapy\par REGIONAL LYMPH NODES: All lymph nodes negative for tumor cells\par Number of lymph nodes examined: 3\par PATHOLOGIC STAGE CLASSIFICATION\par PRIMARY TUMOR: pT1c3: Malignant cells in ascites or\par peritoneal washings and ovarian surface involvement\par REGIONAL LYMPH NODES: pN0\par FIGO STAGE: IC3: Malignant cells present in the ascites or\par peritoneal washings\par ADDITIONAL PATHOLOGIC FINDINGS: ovarian fibroma/thecoma, uterine\par leiomyoma\par \par \par \par \par  [de-identified] : 1/14/2021\par Pt is here to f/u for ovarian cancer, S/P Carbo/Taxol cycle 1 and Zarxio x 5 days.\par Pt is doing well today, denies N/V/D, no chest pain, no abdominal pain, no urinary symptoms. \par Pt reports intermittent mild spotting. Pt had 1 episode of nausea after the chemo and resolved after taking Loperamide, + numbness/tingling of bilateral feet on 1/1/21 with relief from pain meds, + chest discomfort on D3 of Zarxio, went to cardio Dr Teran and EKG was normal. Pt had decreased appetite  during the chemo but had improved this week. \par \par 2/8/21\par pt is here for follow up.\par S/p 2 cycles of chemo.\par She had developed mild neuropathy and was started on Gabapentin.\par Now on Gabapentin, she has improvement. I discussed possible dose reduction however pt does not want to reduce the dose.\par No abd pain, N, V, D\par \par 3/8/21 \par Pt presented today for routine f/u and treatment . She reports that her neuropathy has improved with gabapentin but sfter treatment she gets worsening of symptoms for 5 days . She is not sure whether its arthritis or neuropathy . \par Denies any abdominal pain nausea or vomiting . \par She is s/p 3 cycles , other then neuropathy , tolerating treatment well.

## 2021-03-08 NOTE — REVIEW OF SYSTEMS
[Muscle Pain] : muscle pain [Negative] : Heme/Lymph [FreeTextEntry5] : + chest discomfort [FreeTextEntry7] : + nausea  [FreeTextEntry9] : + bone pain [de-identified] : + numbness and tingling of LE

## 2021-03-08 NOTE — ASSESSMENT
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 \par \par BRCA NEG\par  - was 15 prior to surgery. \par \par \par 1. High grade papillary serous carcinoma of the Lt ovary- [id number],  Capsule intact with +pelvic wash for malignant cells-  pT1c, pN0-  \par \par \par We discussed the diagnosis and staging with patient and the natural history of the disease. Given the 1C stage and high grade serous cancer patient is a candidate for adjuvant chemotherapy. \par \par We discussed Carboplatin AUC 5 and Taxiol 175 mg/m2 every 3 weeks for 6 cycles.  She is s/p 3 cycles with development of mild neuropathy. On gabapentin her symptoms are controlled. Proceed with Cycle # 4 today with fulphila after treatment .\par Counts reviewed and adequate for treatment today . \par We discussed S.E chemo including but not limited to hair loss, N/V/D, low blood counts, increased risk of infections, neuropathy (which could be long lasting), allergic or infusion reactions. \par \par The chemotherapy dose was adjusted according to pt's height, weight, labs and anticipated tolerance.\par The high risk of complications and complexity associated with antineoplastic therapy administration has been explained to the pt and family. Chemotherapy will be given with adequate precautions including premedications, hydration and close monitoring during treatment.\par Chemotherapy will be administered under my direct supervision\par \par \par RTC 3 weeks after chemo. \par All questions and concerns addressed. \par \par \par \par

## 2021-03-09 ENCOUNTER — APPOINTMENT (OUTPATIENT)
Dept: INFUSION THERAPY | Facility: CLINIC | Age: 71
End: 2021-03-09

## 2021-03-09 ENCOUNTER — OUTPATIENT (OUTPATIENT)
Dept: OUTPATIENT SERVICES | Facility: HOSPITAL | Age: 71
LOS: 1 days | Discharge: HOME | End: 2021-03-09

## 2021-03-09 ENCOUNTER — LABORATORY RESULT (OUTPATIENT)
Age: 71
End: 2021-03-09

## 2021-03-09 DIAGNOSIS — Z11.59 ENCOUNTER FOR SCREENING FOR OTHER VIRAL DISEASES: ICD-10-CM

## 2021-03-09 DIAGNOSIS — Z95.5 PRESENCE OF CORONARY ANGIOPLASTY IMPLANT AND GRAFT: Chronic | ICD-10-CM

## 2021-03-09 DIAGNOSIS — Z90.49 ACQUIRED ABSENCE OF OTHER SPECIFIED PARTS OF DIGESTIVE TRACT: Chronic | ICD-10-CM

## 2021-03-09 LAB
ALBUMIN SERPL ELPH-MCNC: 4.1 G/DL
ALP BLD-CCNC: 73 U/L
ALT SERPL-CCNC: 12 U/L
ANION GAP SERPL CALC-SCNC: 13 MMOL/L
AST SERPL-CCNC: 20 U/L
BILIRUB SERPL-MCNC: 1 MG/DL
BUN SERPL-MCNC: 22 MG/DL
CALCIUM SERPL-MCNC: 9.1 MG/DL
CHLORIDE SERPL-SCNC: 103 MMOL/L
CO2 SERPL-SCNC: 24 MMOL/L
CREAT SERPL-MCNC: 0.7 MG/DL
GLUCOSE SERPL-MCNC: 199 MG/DL
POTASSIUM SERPL-SCNC: 4.4 MMOL/L
PROT SERPL-MCNC: 6.7 G/DL
SODIUM SERPL-SCNC: 140 MMOL/L

## 2021-03-09 RX ORDER — PEGFILGRASTIM-CBQV 6 MG/.6ML
6 INJECTION, SOLUTION SUBCUTANEOUS ONCE
Refills: 0 | Status: COMPLETED | OUTPATIENT
Start: 2021-03-09 | End: 2021-03-09

## 2021-03-09 RX ADMIN — PEGFILGRASTIM-CBQV 6 MILLIGRAM(S): 6 INJECTION, SOLUTION SUBCUTANEOUS at 16:24

## 2021-03-22 ENCOUNTER — LABORATORY RESULT (OUTPATIENT)
Age: 71
End: 2021-03-22

## 2021-03-22 ENCOUNTER — APPOINTMENT (OUTPATIENT)
Dept: HEMATOLOGY ONCOLOGY | Facility: CLINIC | Age: 71
End: 2021-03-22

## 2021-03-22 LAB
ALBUMIN SERPL ELPH-MCNC: 3.9 G/DL
ALP BLD-CCNC: 91 U/L
ALT SERPL-CCNC: 15 U/L
ANION GAP SERPL CALC-SCNC: 11 MMOL/L
AST SERPL-CCNC: 14 U/L
BILIRUB SERPL-MCNC: 0.5 MG/DL
BUN SERPL-MCNC: 10 MG/DL
CALCIUM SERPL-MCNC: 8.2 MG/DL
CHLORIDE SERPL-SCNC: 107 MMOL/L
CO2 SERPL-SCNC: 24 MMOL/L
CREAT SERPL-MCNC: 0.6 MG/DL
GLUCOSE SERPL-MCNC: 189 MG/DL
HCT VFR BLD CALC: 28.8 %
HGB BLD-MCNC: 9.7 G/DL
MAGNESIUM SERPL-MCNC: 1.3 MG/DL
MCHC RBC-ENTMCNC: 32.8 PG
MCHC RBC-ENTMCNC: 33.7 G/DL
MCV RBC AUTO: 97.3 FL
PLATELET # BLD AUTO: 149 K/UL
PMV BLD: 10 FL
POTASSIUM SERPL-SCNC: 4.4 MMOL/L
PROT SERPL-MCNC: 6.6 G/DL
RBC # BLD: 2.96 M/UL
RBC # FLD: 17.8 %
SODIUM SERPL-SCNC: 142 MMOL/L
WBC # FLD AUTO: 4.29 K/UL

## 2021-03-29 ENCOUNTER — LABORATORY RESULT (OUTPATIENT)
Age: 71
End: 2021-03-29

## 2021-03-29 ENCOUNTER — APPOINTMENT (OUTPATIENT)
Dept: INFUSION THERAPY | Facility: CLINIC | Age: 71
End: 2021-03-29
Payer: MEDICARE

## 2021-03-29 ENCOUNTER — APPOINTMENT (OUTPATIENT)
Dept: HEMATOLOGY ONCOLOGY | Facility: CLINIC | Age: 71
End: 2021-03-29
Payer: MEDICARE

## 2021-03-29 VITALS
HEART RATE: 79 BPM | BODY MASS INDEX: 37.57 KG/M2 | TEMPERATURE: 98.3 F | WEIGHT: 199 LBS | DIASTOLIC BLOOD PRESSURE: 73 MMHG | SYSTOLIC BLOOD PRESSURE: 125 MMHG | HEIGHT: 61 IN

## 2021-03-29 PROCEDURE — 99213 OFFICE O/P EST LOW 20 MIN: CPT

## 2021-03-29 RX ORDER — DEXAMETHASONE 0.5 MG/5ML
20 ELIXIR ORAL ONCE
Refills: 0 | Status: COMPLETED | OUTPATIENT
Start: 2021-03-29 | End: 2021-03-29

## 2021-03-29 RX ORDER — DIPHENHYDRAMINE HCL 50 MG
50 CAPSULE ORAL ONCE
Refills: 0 | Status: COMPLETED | OUTPATIENT
Start: 2021-03-29 | End: 2021-03-29

## 2021-03-29 RX ORDER — CARBOPLATIN 50 MG
645 VIAL (EA) INTRAVENOUS ONCE
Refills: 0 | Status: COMPLETED | OUTPATIENT
Start: 2021-03-29 | End: 2021-03-29

## 2021-03-29 RX ORDER — FOSAPREPITANT DIMEGLUMINE 150 MG/5ML
150 INJECTION, POWDER, LYOPHILIZED, FOR SOLUTION INTRAVENOUS ONCE
Refills: 0 | Status: COMPLETED | OUTPATIENT
Start: 2021-03-29 | End: 2021-03-29

## 2021-03-29 RX ORDER — PACLITAXEL 6 MG/ML
325 INJECTION, SOLUTION, CONCENTRATE INTRAVENOUS ONCE
Refills: 0 | Status: COMPLETED | OUTPATIENT
Start: 2021-03-29 | End: 2021-03-29

## 2021-03-29 RX ORDER — FAMOTIDINE 10 MG/ML
20 INJECTION INTRAVENOUS ONCE
Refills: 0 | Status: COMPLETED | OUTPATIENT
Start: 2021-03-29 | End: 2021-03-29

## 2021-03-29 RX ADMIN — PACLITAXEL 325 MILLIGRAM(S): 6 INJECTION, SOLUTION, CONCENTRATE INTRAVENOUS at 16:50

## 2021-03-29 RX ADMIN — FOSAPREPITANT DIMEGLUMINE 300 MILLIGRAM(S): 150 INJECTION, POWDER, LYOPHILIZED, FOR SOLUTION INTRAVENOUS at 14:10

## 2021-03-29 RX ADMIN — FAMOTIDINE 104 MILLIGRAM(S): 10 INJECTION INTRAVENOUS at 13:30

## 2021-03-29 RX ADMIN — Medication 314.5 MILLIGRAM(S): at 16:50

## 2021-03-29 RX ADMIN — Medication 645 MILLIGRAM(S): at 17:50

## 2021-03-29 RX ADMIN — Medication 126 MILLIGRAM(S): at 13:10

## 2021-03-29 RX ADMIN — Medication 50 MILLIGRAM(S): at 14:10

## 2021-03-29 RX ADMIN — FOSAPREPITANT DIMEGLUMINE 150 MILLIGRAM(S): 150 INJECTION, POWDER, LYOPHILIZED, FOR SOLUTION INTRAVENOUS at 14:30

## 2021-03-29 RX ADMIN — Medication 102 MILLIGRAM(S): at 13:50

## 2021-03-29 RX ADMIN — PACLITAXEL 184.72 MILLIGRAM(S): 6 INJECTION, SOLUTION, CONCENTRATE INTRAVENOUS at 14:30

## 2021-03-29 RX ADMIN — Medication 20 MILLIGRAM(S): at 13:31

## 2021-03-29 RX ADMIN — FAMOTIDINE 20 MILLIGRAM(S): 10 INJECTION INTRAVENOUS at 13:51

## 2021-03-30 ENCOUNTER — OUTPATIENT (OUTPATIENT)
Dept: OUTPATIENT SERVICES | Facility: HOSPITAL | Age: 71
LOS: 1 days | Discharge: HOME | End: 2021-03-30

## 2021-03-30 ENCOUNTER — APPOINTMENT (OUTPATIENT)
Dept: INFUSION THERAPY | Facility: CLINIC | Age: 71
End: 2021-03-30

## 2021-03-30 DIAGNOSIS — N94.89 OTHER SPECIFIED CONDITIONS ASSOCIATED WITH FEMALE GENITAL ORGANS AND MENSTRUAL CYCLE: ICD-10-CM

## 2021-03-30 DIAGNOSIS — Z51.11 ENCOUNTER FOR ANTINEOPLASTIC CHEMOTHERAPY: ICD-10-CM

## 2021-03-30 DIAGNOSIS — Z90.49 ACQUIRED ABSENCE OF OTHER SPECIFIED PARTS OF DIGESTIVE TRACT: Chronic | ICD-10-CM

## 2021-03-30 DIAGNOSIS — C56.2 MALIGNANT NEOPLASM OF LEFT OVARY: ICD-10-CM

## 2021-03-30 DIAGNOSIS — Z95.5 PRESENCE OF CORONARY ANGIOPLASTY IMPLANT AND GRAFT: Chronic | ICD-10-CM

## 2021-03-30 RX ORDER — PEGFILGRASTIM-CBQV 6 MG/.6ML
6 INJECTION, SOLUTION SUBCUTANEOUS ONCE
Refills: 0 | Status: COMPLETED | OUTPATIENT
Start: 2021-03-30 | End: 2021-03-30

## 2021-03-30 RX ADMIN — PEGFILGRASTIM-CBQV 6 MILLIGRAM(S): 6 INJECTION, SOLUTION SUBCUTANEOUS at 14:26

## 2021-03-31 LAB
ALBUMIN SERPL ELPH-MCNC: 4.2 G/DL
ALP BLD-CCNC: 74 U/L
ALT SERPL-CCNC: 13 U/L
ANION GAP SERPL CALC-SCNC: 10 MMOL/L
AST SERPL-CCNC: 16 U/L
BILIRUB SERPL-MCNC: 1.3 MG/DL
BUN SERPL-MCNC: 20 MG/DL
CALCIUM SERPL-MCNC: 9.1 MG/DL
CHLORIDE SERPL-SCNC: 104 MMOL/L
CO2 SERPL-SCNC: 27 MMOL/L
CREAT SERPL-MCNC: 0.7 MG/DL
GLUCOSE SERPL-MCNC: 224 MG/DL
HCT VFR BLD CALC: 29.7 %
HGB BLD-MCNC: 10.1 G/DL
MCHC RBC-ENTMCNC: 33.1 PG
MCHC RBC-ENTMCNC: 34 G/DL
MCV RBC AUTO: 97.4 FL
PLATELET # BLD AUTO: 236 K/UL
PMV BLD: 9.6 FL
POTASSIUM SERPL-SCNC: 4 MMOL/L
PROT SERPL-MCNC: 6.8 G/DL
RBC # BLD: 3.05 M/UL
RBC # FLD: 18.9 %
SODIUM SERPL-SCNC: 141 MMOL/L
WBC # FLD AUTO: 2.83 K/UL

## 2021-04-05 NOTE — HISTORY OF PRESENT ILLNESS
[de-identified] : 70 year old female with PMH of CAD, Afib on eliquis, DM, HTN, DLD is here to discuss adjuvant therapy for new diagnosis of ovarian cancer. \par \par  Patient had episode of chest pain in August for which a CTA was performed. An incidental finding revealed a left adnexal mass. She then saw her OBGYN. A pelvic ultrasound was done, as well as a dedicated CT of Abdomen and Pelvis that showed a 8.3 x 8.8 x 7.4 cm left adnexal mass with solid components. 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 - was 15 prior to surgery. \par \par She has recovered well from surgery. She does not have any c/o abdominal pain, weight loss, GOPI. No c/o chest pain/ SOB. \par She is very active and lives with her family at home. \par She takes eliquis and ASA 81 for CAD and Afib \par She was getting B12 inj with PMD for low B12 \par \par H/o Colon cancer in 3 maternal aunts \par Last Mammogram and Colonoscopy- 2-3 months normal \par Radiology- 08/20- CTA- No CTA evidence of aortic dissection or intramural hematoma. 5.3 cm left adnexal cystic lesion. Recommend ultrasound prior to gadolinium enhanced MRI of the pelvis for further evaluation on an outpatient basis.\par \par 11/20- MRI pelvis- Complex cystic lesion lt adnexa with hemorrhagic debris. No pelvic or inguinal adenopathy. \par \par  \par Pathology: Final Diagnosis\par 1. Left tube and ovary (left pelvic mass):\par - Left ovary, high grade papillary serous carcinoma.\par - (The ovarian surface is involved by tumor).\par - Ovarian fibroma / thecoma\par - Fallopian tube, paratubal cyst\par \par 2. Cervix, uterus, right tube and ovary:\par - Uterine cervix, reactive changes\par - Endometrium, mainly atrophic\par - Uterus, leiomyoma\par - Right ovary, no involvement by carcinoma is seen.\par - Fallopian tube, paratubal cyst\par \par 3. Portion of omentum:\par - Omental adipose tissue, no involvement by carcinoma is seen.\par \par 4. Left pelvic nodes:\par - Lymph nodes, no metastatic carcinoma is seen (0/3).\par \par \par PELVIC FLUID:\par - POSITIVE FOR MALIGNANT CELLS.\par - Adenocarcinoma.\par \par \par PROCEDURE: Total hysterectomy and bilateral salpingo-\par oophorectomy, omentum, lymph nodes\par HYSTERECTOMY TYPE: Laparoscopic\par SPECIMEN INTEGRITY OF RIGHT OVARY: Capsule intact\par SPECIMEN INTEGRITY OF LEFT OVARY: Capsule intact\par SPECIMEN INTEGRITY OF RIGHT FALLOPIAN TUBE: Serosa intact\par SPECIMEN INTEGRITY OF LEFT FALLOPIAN TUBE: Serosa intact\par TUMOR SITE: Left ovary\par OVARIAN SURFACE INVOLVEMENT: Present\par FALLOPIAN TUBE SURFACE INVOLVEMENT: Absent\par TUMOR SIZE: Greatest dimension: 3.5 cm (solid /papillary\par component)\par Additional dimensions: 2.6 x 1.4 cm\par HISTOLOGIC TYPE: Serous carcinoma\par HISTOLOGIC GRADE: WHO GRADING SYSTEM: G3: Poorly differentiated\par TWO TIER GRADING SYSTEM: High grade\par IMPLANTS: n/a\par OTHER TISSUE/ORGAN INVOLVEMENT: Not identified\par LARGEST EXTRAPELVIC PERITONEAL FOCUS: n/a\par PERITONEAL / ASCITIC FLUID: Malignant\par TREATMENT EFFECT: No known presurgical therapy\par REGIONAL LYMPH NODES: All lymph nodes negative for tumor cells\par Number of lymph nodes examined: 3\par PATHOLOGIC STAGE CLASSIFICATION\par PRIMARY TUMOR: pT1c3: Malignant cells in ascites or\par peritoneal washings and ovarian surface involvement\par REGIONAL LYMPH NODES: pN0\par FIGO STAGE: IC3: Malignant cells present in the ascites or\par peritoneal washings\par ADDITIONAL PATHOLOGIC FINDINGS: ovarian fibroma/thecoma, uterine\par leiomyoma\par \par \par \par \par  [de-identified] : 1/14/2021\par Pt is here to f/u for ovarian cancer, S/P Carbo/Taxol cycle 1 and Zarxio x 5 days.\par Pt is doing well today, denies N/V/D, no chest pain, no abdominal pain, no urinary symptoms. \par Pt reports intermittent mild spotting. Pt had 1 episode of nausea after the chemo and resolved after taking Loperamide, + numbness/tingling of bilateral feet on 1/1/21 with relief from pain meds, + chest discomfort on D3 of Zarxio, went to cardio Dr Teran and EKG was normal. Pt had decreased appetite  during the chemo but had improved this week. \par \par 2/8/21\par pt is here for follow up.\par S/p 2 cycles of chemo.\par She had developed mild neuropathy and was started on Gabapentin.\par Now on Gabapentin, she has improvement. I discussed possible dose reduction however pt does not want to reduce the dose.\par No abd pain, N, V, D\par \par 3/8/21 \par Pt presented today for routine f/u and treatment . She reports that her neuropathy has improved with gabapentin but sfter treatment she gets worsening of symptoms for 5 days . She is not sure whether its arthritis or neuropathy . \par Denies any abdominal pain nausea or vomiting . \par She is s/p 3 cycles , other then neuropathy , tolerating treatment well. \par \par 3/29/21 \par Pt presented today for f/u and to receive treatment . She reports feeling fine . Her neuropathy is not getting worse but she does c/o some tingling and numbness in fingers . \par No c/o nausea , vomiting or diarrhea . Tolerating treatment well.\par She was encouraged to receive covid vaccine .

## 2021-04-05 NOTE — REVIEW OF SYSTEMS
[Muscle Pain] : muscle pain [Negative] : Heme/Lymph [FreeTextEntry5] : + chest discomfort [FreeTextEntry7] : + nausea  [FreeTextEntry9] : + bone pain [de-identified] : + numbness and tingling of LE

## 2021-04-05 NOTE — ASSESSMENT
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 \par \par BRCA NEG\par  - was 15 prior to surgery. \par \par \par 1. High grade papillary serous carcinoma of the Lt ovary- [id number],  Capsule intact with +pelvic wash for malignant cells-  pT1c, pN0-  \par \par \par We discussed the diagnosis and staging with patient and the natural history of the disease. Given the 1C stage and high grade serous cancer patient is a candidate for adjuvant chemotherapy. \par \par We discussed Carboplatin AUC 5 and Taxol 175 mg/m2 every 3 weeks for 6 cycles.  She is s/p 4 cycles with development of mild neuropathy. On gabapentin her symptoms are controlled. Proceed with Cycle # 5 today with fulphila after treatment .\par Counts reviewed and adequate for treatment today . \par We discussed S.E chemo including but not limited to hair loss, N/V/D, low blood counts, increased risk of infections, neuropathy (which could be long lasting), allergic or infusion reactions. \par \par The chemotherapy dose was adjusted according to pt's height, weight, labs and anticipated tolerance.\par The high risk of complications and complexity associated with antineoplastic therapy administration has been explained to the pt and family. Chemotherapy will be given with adequate precautions including premedications, hydration and close monitoring during treatment.\par Chemotherapy will be administered under my direct supervision\par \par \par RTC 3 weeks after chemo. \par All questions and concerns addressed. \par \par \par \par

## 2021-04-05 NOTE — PHYSICAL EXAM
[Fully active, able to carry on all pre-disease performance without restriction] : Status 0 - Fully active, able to carry on all pre-disease performance without restriction [Normal] : affect appropriate [de-identified] : No palpable masses [de-identified] : Laparoscopy scars are healing well . Non tender

## 2021-04-05 NOTE — END OF VISIT
[] : Fellow [FreeTextEntry3] : She was seen with fellow today. We will proceed with next cycle of adjuvant chemotherapy today. RTC in 3 weeks.

## 2021-04-19 ENCOUNTER — APPOINTMENT (OUTPATIENT)
Dept: HEMATOLOGY ONCOLOGY | Facility: CLINIC | Age: 71
End: 2021-04-19
Payer: MEDICARE

## 2021-04-19 ENCOUNTER — APPOINTMENT (OUTPATIENT)
Dept: INFUSION THERAPY | Facility: CLINIC | Age: 71
End: 2021-04-19
Payer: MEDICARE

## 2021-04-19 ENCOUNTER — LABORATORY RESULT (OUTPATIENT)
Age: 71
End: 2021-04-19

## 2021-04-19 VITALS
DIASTOLIC BLOOD PRESSURE: 68 MMHG | HEART RATE: 78 BPM | WEIGHT: 195 LBS | BODY MASS INDEX: 36.82 KG/M2 | HEIGHT: 61 IN | TEMPERATURE: 97.3 F | SYSTOLIC BLOOD PRESSURE: 137 MMHG

## 2021-04-19 LAB
ALBUMIN SERPL ELPH-MCNC: 4.2 G/DL
ALP BLD-CCNC: 81 U/L
ALT SERPL-CCNC: 12 U/L
ANION GAP SERPL CALC-SCNC: 10 MMOL/L
AST SERPL-CCNC: 14 U/L
BILIRUB SERPL-MCNC: 1.1 MG/DL
BUN SERPL-MCNC: 19 MG/DL
CALCIUM SERPL-MCNC: 9 MG/DL
CHLORIDE SERPL-SCNC: 104 MMOL/L
CO2 SERPL-SCNC: 25 MMOL/L
CREAT SERPL-MCNC: 0.6 MG/DL
GLUCOSE SERPL-MCNC: 119 MG/DL
HCT VFR BLD CALC: 27.2 %
HGB BLD-MCNC: 9.1 G/DL
MCHC RBC-ENTMCNC: 33.5 G/DL
MCHC RBC-ENTMCNC: 34.6 PG
MCV RBC AUTO: 103.4 FL
PLATELET # BLD AUTO: 219 K/UL
PMV BLD: 9.8 FL
POTASSIUM SERPL-SCNC: 4.2 MMOL/L
PROT SERPL-MCNC: 6.8 G/DL
RBC # BLD: 2.63 M/UL
RBC # FLD: 18.3 %
SODIUM SERPL-SCNC: 139 MMOL/L
WBC # FLD AUTO: 3.3 K/UL

## 2021-04-19 PROCEDURE — 99215 OFFICE O/P EST HI 40 MIN: CPT

## 2021-04-19 RX ORDER — FAMOTIDINE 10 MG/ML
20 INJECTION INTRAVENOUS ONCE
Refills: 0 | Status: COMPLETED | OUTPATIENT
Start: 2021-04-19 | End: 2021-04-19

## 2021-04-19 RX ORDER — CARBOPLATIN 50 MG
645 VIAL (EA) INTRAVENOUS ONCE
Refills: 0 | Status: COMPLETED | OUTPATIENT
Start: 2021-04-19 | End: 2021-04-19

## 2021-04-19 RX ORDER — PACLITAXEL 6 MG/ML
325 INJECTION, SOLUTION, CONCENTRATE INTRAVENOUS ONCE
Refills: 0 | Status: COMPLETED | OUTPATIENT
Start: 2021-04-19 | End: 2021-04-19

## 2021-04-19 RX ORDER — DEXAMETHASONE 0.5 MG/5ML
20 ELIXIR ORAL ONCE
Refills: 0 | Status: COMPLETED | OUTPATIENT
Start: 2021-04-19 | End: 2021-04-19

## 2021-04-19 RX ORDER — FOSAPREPITANT DIMEGLUMINE 150 MG/5ML
150 INJECTION, POWDER, LYOPHILIZED, FOR SOLUTION INTRAVENOUS ONCE
Refills: 0 | Status: COMPLETED | OUTPATIENT
Start: 2021-04-19 | End: 2021-04-19

## 2021-04-19 RX ORDER — DIPHENHYDRAMINE HCL 50 MG
50 CAPSULE ORAL ONCE
Refills: 0 | Status: COMPLETED | OUTPATIENT
Start: 2021-04-19 | End: 2021-04-19

## 2021-04-19 RX ADMIN — PACLITAXEL 325 MILLIGRAM(S): 6 INJECTION, SOLUTION, CONCENTRATE INTRAVENOUS at 16:40

## 2021-04-19 RX ADMIN — PACLITAXEL 184.72 MILLIGRAM(S): 6 INJECTION, SOLUTION, CONCENTRATE INTRAVENOUS at 14:15

## 2021-04-19 RX ADMIN — FAMOTIDINE 20 MILLIGRAM(S): 10 INJECTION INTRAVENOUS at 13:30

## 2021-04-19 RX ADMIN — Medication 20 MILLIGRAM(S): at 13:10

## 2021-04-19 RX ADMIN — Medication 314.5 MILLIGRAM(S): at 16:40

## 2021-04-19 RX ADMIN — FAMOTIDINE 104 MILLIGRAM(S): 10 INJECTION INTRAVENOUS at 13:15

## 2021-04-19 RX ADMIN — Medication 50 MILLIGRAM(S): at 12:50

## 2021-04-19 RX ADMIN — Medication 126 MILLIGRAM(S): at 12:55

## 2021-04-19 RX ADMIN — FOSAPREPITANT DIMEGLUMINE 300 MILLIGRAM(S): 150 INJECTION, POWDER, LYOPHILIZED, FOR SOLUTION INTRAVENOUS at 13:55

## 2021-04-19 RX ADMIN — Medication 645 MILLIGRAM(S): at 17:40

## 2021-04-19 RX ADMIN — Medication 102 MILLIGRAM(S): at 12:35

## 2021-04-19 RX ADMIN — FOSAPREPITANT DIMEGLUMINE 150 MILLIGRAM(S): 150 INJECTION, POWDER, LYOPHILIZED, FOR SOLUTION INTRAVENOUS at 14:15

## 2021-04-20 ENCOUNTER — APPOINTMENT (OUTPATIENT)
Dept: INFUSION THERAPY | Facility: CLINIC | Age: 71
End: 2021-04-20

## 2021-04-20 RX ORDER — PEGFILGRASTIM-CBQV 6 MG/.6ML
6 INJECTION, SOLUTION SUBCUTANEOUS ONCE
Refills: 0 | Status: COMPLETED | OUTPATIENT
Start: 2021-04-20 | End: 2021-04-20

## 2021-04-20 RX ADMIN — PEGFILGRASTIM-CBQV 6 MILLIGRAM(S): 6 INJECTION, SOLUTION SUBCUTANEOUS at 15:27

## 2021-04-23 NOTE — REVIEW OF SYSTEMS
[Muscle Pain] : muscle pain [Negative] : Heme/Lymph [FreeTextEntry5] : + chest discomfort [FreeTextEntry7] : + nausea  [FreeTextEntry9] : + bone pain [de-identified] : + numbness and tingling of LE

## 2021-04-23 NOTE — ASSESSMENT
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 \par \par BRCA NEG\par  - was 15 prior to surgery. \par \par \par 1. High grade papillary serous carcinoma of the Lt ovary- [id number],  Capsule intact with +pelvic wash for malignant cells-  pT1c, pN0-  \par \par \par We discussed the diagnosis and staging with patient and the natural history of the disease. Given the 1C stage and high grade serous cancer patient is a candidate for adjuvant chemotherapy. \par \par We discussed Carboplatin AUC 5 and Taxol 175 mg/m2 every 3 weeks for 6 cycles.  She is s/p 5 cycles with development of mild neuropathy. On gabapentin her symptoms are controlled. Proceed with Cycle # 6 today with fulphila after treatment .\par Counts reviewed and adequate for treatment today . \par We discussed S.E chemo including but not limited to hair loss, N/V/D, low blood counts, increased risk of infections, neuropathy (which could be long lasting), allergic or infusion reactions. \par \par The chemotherapy dose was adjusted according to pt's height, weight, labs and anticipated tolerance.\par The high risk of complications and complexity associated with antineoplastic therapy administration has been explained to the pt and family. Chemotherapy will be given with adequate precautions including premedications, hydration and close monitoring during treatment.\par Chemotherapy will be administered under my direct supervision\par \par Recommended to continue getting yearly mammograms and GYN exams \par \par \par RTC 6 weeks after chemo and we will discuss surveillance with her . \par All questions and concerns addressed. \par \par \par \par

## 2021-04-23 NOTE — HISTORY OF PRESENT ILLNESS
[de-identified] : 70 year old female with PMH of CAD, Afib on eliquis, DM, HTN, DLD is here to discuss adjuvant therapy for new diagnosis of ovarian cancer. \par \par  Patient had episode of chest pain in August for which a CTA was performed. An incidental finding revealed a left adnexal mass. She then saw her OBGYN. A pelvic ultrasound was done, as well as a dedicated CT of Abdomen and Pelvis that showed a 8.3 x 8.8 x 7.4 cm left adnexal mass with solid components. 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 - was 15 prior to surgery. \par \par She has recovered well from surgery. She does not have any c/o abdominal pain, weight loss, GOPI. No c/o chest pain/ SOB. \par She is very active and lives with her family at home. \par She takes eliquis and ASA 81 for CAD and Afib \par She was getting B12 inj with PMD for low B12 \par \par H/o Colon cancer in 3 maternal aunts \par Last Mammogram and Colonoscopy- 2-3 months normal \par Radiology- 08/20- CTA- No CTA evidence of aortic dissection or intramural hematoma. 5.3 cm left adnexal cystic lesion. Recommend ultrasound prior to gadolinium enhanced MRI of the pelvis for further evaluation on an outpatient basis.\par \par 11/20- MRI pelvis- Complex cystic lesion lt adnexa with hemorrhagic debris. No pelvic or inguinal adenopathy. \par \par  \par Pathology: Final Diagnosis\par 1. Left tube and ovary (left pelvic mass):\par - Left ovary, high grade papillary serous carcinoma.\par - (The ovarian surface is involved by tumor).\par - Ovarian fibroma / thecoma\par - Fallopian tube, paratubal cyst\par \par 2. Cervix, uterus, right tube and ovary:\par - Uterine cervix, reactive changes\par - Endometrium, mainly atrophic\par - Uterus, leiomyoma\par - Right ovary, no involvement by carcinoma is seen.\par - Fallopian tube, paratubal cyst\par \par 3. Portion of omentum:\par - Omental adipose tissue, no involvement by carcinoma is seen.\par \par 4. Left pelvic nodes:\par - Lymph nodes, no metastatic carcinoma is seen (0/3).\par \par \par PELVIC FLUID:\par - POSITIVE FOR MALIGNANT CELLS.\par - Adenocarcinoma.\par \par \par PROCEDURE: Total hysterectomy and bilateral salpingo-\par oophorectomy, omentum, lymph nodes\par HYSTERECTOMY TYPE: Laparoscopic\par SPECIMEN INTEGRITY OF RIGHT OVARY: Capsule intact\par SPECIMEN INTEGRITY OF LEFT OVARY: Capsule intact\par SPECIMEN INTEGRITY OF RIGHT FALLOPIAN TUBE: Serosa intact\par SPECIMEN INTEGRITY OF LEFT FALLOPIAN TUBE: Serosa intact\par TUMOR SITE: Left ovary\par OVARIAN SURFACE INVOLVEMENT: Present\par FALLOPIAN TUBE SURFACE INVOLVEMENT: Absent\par TUMOR SIZE: Greatest dimension: 3.5 cm (solid /papillary\par component)\par Additional dimensions: 2.6 x 1.4 cm\par HISTOLOGIC TYPE: Serous carcinoma\par HISTOLOGIC GRADE: WHO GRADING SYSTEM: G3: Poorly differentiated\par TWO TIER GRADING SYSTEM: High grade\par IMPLANTS: n/a\par OTHER TISSUE/ORGAN INVOLVEMENT: Not identified\par LARGEST EXTRAPELVIC PERITONEAL FOCUS: n/a\par PERITONEAL / ASCITIC FLUID: Malignant\par TREATMENT EFFECT: No known presurgical therapy\par REGIONAL LYMPH NODES: All lymph nodes negative for tumor cells\par Number of lymph nodes examined: 3\par PATHOLOGIC STAGE CLASSIFICATION\par PRIMARY TUMOR: pT1c3: Malignant cells in ascites or\par peritoneal washings and ovarian surface involvement\par REGIONAL LYMPH NODES: pN0\par FIGO STAGE: IC3: Malignant cells present in the ascites or\par peritoneal washings\par ADDITIONAL PATHOLOGIC FINDINGS: ovarian fibroma/thecoma, uterine\par leiomyoma\par \par \par \par \par  [de-identified] : 1/14/2021\par Pt is here to f/u for ovarian cancer, S/P Carbo/Taxol cycle 1 and Zarxio x 5 days.\par Pt is doing well today, denies N/V/D, no chest pain, no abdominal pain, no urinary symptoms. \par Pt reports intermittent mild spotting. Pt had 1 episode of nausea after the chemo and resolved after taking Loperamide, + numbness/tingling of bilateral feet on 1/1/21 with relief from pain meds, + chest discomfort on D3 of Zarxio, went to cardio Dr Teran and EKG was normal. Pt had decreased appetite  during the chemo but had improved this week. \par \par 2/8/21\par pt is here for follow up.\par S/p 2 cycles of chemo.\par She had developed mild neuropathy and was started on Gabapentin.\par Now on Gabapentin, she has improvement. I discussed possible dose reduction however pt does not want to reduce the dose.\par No abd pain, N, V, D\par \par 3/8/21 \par Pt presented today for routine f/u and treatment . She reports that her neuropathy has improved with gabapentin but sfter treatment she gets worsening of symptoms for 5 days . She is not sure whether its arthritis or neuropathy . \par Denies any abdominal pain nausea or vomiting . \par She is s/p 3 cycles , other then neuropathy , tolerating treatment well. \par \par 3/29/21 \par Pt presented today for f/u and to receive treatment . She reports feeling fine . Her neuropathy is not getting worse but she does c/o some tingling and numbness in fingers . \par No c/o nausea , vomiting or diarrhea . Tolerating treatment well.\par She was encouraged to receive covid vaccine . \par \par 4/19/21\par Pt presented for f/u and treatment today . She is s/p 5 cycles of carbo/ taxol for Stage 1C3 serous ovarian cancer . Tolerating treatment well except tingling and numbness , which she reports comes and goes . \par No c/o nausea ., vomiting or diarrhea . \par She will receive covid vaccine after finishing treatment . Requires GCSF support with chemo .

## 2021-04-23 NOTE — PHYSICAL EXAM
[Fully active, able to carry on all pre-disease performance without restriction] : Status 0 - Fully active, able to carry on all pre-disease performance without restriction [Normal] : affect appropriate [de-identified] : No palpable masses [de-identified] : Laparoscopy scars are healing well . Non tender

## 2021-04-23 NOTE — END OF VISIT
[] : Fellow [FreeTextEntry3] :  High grade papillary serous carcinoma of the Lt ovary- [id number], s/p 5 cycles of adjuvant chemo with Carboplatin and taxol \par Proceed with cycle # 6 with Neulasta\par RTC in 6 weeks

## 2021-05-17 ENCOUNTER — APPOINTMENT (OUTPATIENT)
Dept: CARDIOLOGY | Facility: CLINIC | Age: 71
End: 2021-05-17
Payer: MEDICARE

## 2021-05-17 PROCEDURE — 93000 ELECTROCARDIOGRAM COMPLETE: CPT

## 2021-05-17 PROCEDURE — 99213 OFFICE O/P EST LOW 20 MIN: CPT

## 2021-05-17 PROCEDURE — 99072 ADDL SUPL MATRL&STAF TM PHE: CPT

## 2021-05-19 ENCOUNTER — TRANSCRIPTION ENCOUNTER (OUTPATIENT)
Age: 71
End: 2021-05-19

## 2021-06-01 ENCOUNTER — APPOINTMENT (OUTPATIENT)
Dept: HEMATOLOGY ONCOLOGY | Facility: CLINIC | Age: 71
End: 2021-06-01

## 2021-06-15 ENCOUNTER — APPOINTMENT (OUTPATIENT)
Dept: GYNECOLOGIC ONCOLOGY | Facility: CLINIC | Age: 71
End: 2021-06-15
Payer: MEDICARE

## 2021-06-15 VITALS
HEIGHT: 61 IN | SYSTOLIC BLOOD PRESSURE: 122 MMHG | WEIGHT: 191 LBS | BODY MASS INDEX: 36.06 KG/M2 | DIASTOLIC BLOOD PRESSURE: 78 MMHG | TEMPERATURE: 98 F

## 2021-06-15 PROCEDURE — 99214 OFFICE O/P EST MOD 30 MIN: CPT

## 2021-06-15 PROCEDURE — 99072 ADDL SUPL MATRL&STAF TM PHE: CPT

## 2021-06-15 NOTE — ASSESSMENT
[FreeTextEntry1] : 71 year old patient of Dr. 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  Pathology reports FIGO 1 C3 (positive cytology).  She was given 6 cycles of chemotherapy by Dr. Aviles.  Last treatment was on 2021.  She complains of neuropathy and is taking gabapentin.   She presents for follow-up .   \par The patient appears to be doing well with no clinical evidence of disease.\par

## 2021-06-15 NOTE — HISTORY OF PRESENT ILLNESS
[FreeTextEntry1] : 71 year old patient of Dr. 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  Pathology reports FIGO 1 C3 (positive cytology).  She was given 6 cycles of chemotherapy by Dr. Aviles.  Last treatment was on 2021.  She complains of neuropathy and is taking gabapentin.   She presents for follow-up .

## 2021-06-24 ENCOUNTER — OUTPATIENT (OUTPATIENT)
Dept: OUTPATIENT SERVICES | Facility: HOSPITAL | Age: 71
LOS: 1 days | Discharge: HOME | End: 2021-06-24

## 2021-06-24 ENCOUNTER — APPOINTMENT (OUTPATIENT)
Dept: HEMATOLOGY ONCOLOGY | Facility: CLINIC | Age: 71
End: 2021-06-24
Payer: MEDICARE

## 2021-06-24 ENCOUNTER — LABORATORY RESULT (OUTPATIENT)
Age: 71
End: 2021-06-24

## 2021-06-24 VITALS
SYSTOLIC BLOOD PRESSURE: 159 MMHG | BODY MASS INDEX: 36.25 KG/M2 | HEIGHT: 61 IN | HEART RATE: 76 BPM | TEMPERATURE: 97.6 F | RESPIRATION RATE: 14 BRPM | DIASTOLIC BLOOD PRESSURE: 75 MMHG | WEIGHT: 192 LBS

## 2021-06-24 DIAGNOSIS — Z95.5 PRESENCE OF CORONARY ANGIOPLASTY IMPLANT AND GRAFT: Chronic | ICD-10-CM

## 2021-06-24 DIAGNOSIS — Z90.49 ACQUIRED ABSENCE OF OTHER SPECIFIED PARTS OF DIGESTIVE TRACT: Chronic | ICD-10-CM

## 2021-06-24 DIAGNOSIS — G62.9 POLYNEUROPATHY, UNSPECIFIED: ICD-10-CM

## 2021-06-24 DIAGNOSIS — Z51.11 ENCOUNTER FOR ANTINEOPLASTIC CHEMOTHERAPY: ICD-10-CM

## 2021-06-24 DIAGNOSIS — D70.2 OTHER DRUG-INDUCED AGRANULOCYTOSIS: ICD-10-CM

## 2021-06-24 DIAGNOSIS — C56.2 MALIGNANT NEOPLASM OF LEFT OVARY: ICD-10-CM

## 2021-06-24 LAB
HCT VFR BLD CALC: 31.9 %
HGB BLD-MCNC: 11 G/DL
MCHC RBC-ENTMCNC: 34.4 PG
MCHC RBC-ENTMCNC: 34.5 G/DL
MCV RBC AUTO: 99.7 FL
PLATELET # BLD AUTO: 196 K/UL
PMV BLD: 10.7 FL
RBC # BLD: 3.2 M/UL
RBC # FLD: 12 %
WBC # FLD AUTO: 3.02 K/UL

## 2021-06-24 PROCEDURE — 99213 OFFICE O/P EST LOW 20 MIN: CPT

## 2021-06-25 LAB
ALBUMIN SERPL ELPH-MCNC: 4.6 G/DL
ALP BLD-CCNC: 67 U/L
ALT SERPL-CCNC: 14 U/L
ANION GAP SERPL CALC-SCNC: 8 MMOL/L
AST SERPL-CCNC: 18 U/L
BILIRUB SERPL-MCNC: 1.9 MG/DL
BUN SERPL-MCNC: 23 MG/DL
CALCIUM SERPL-MCNC: 9.6 MG/DL
CANCER AG125 SERPL-ACNC: 10 U/ML
CHLORIDE SERPL-SCNC: 102 MMOL/L
CO2 SERPL-SCNC: 28 MMOL/L
CREAT SERPL-MCNC: 0.7 MG/DL
GLUCOSE SERPL-MCNC: 104 MG/DL
POTASSIUM SERPL-SCNC: 4.5 MMOL/L
PROT SERPL-MCNC: 7.2 G/DL
SODIUM SERPL-SCNC: 138 MMOL/L

## 2021-06-26 NOTE — ASSESSMENT
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 \par \par BRCA NEG\par - was 15 prior to surgery. \par \par PLAN: \par Previous notes reviewed and all relevant laboratory results discussed with Dr Aviles and communicated to the patient.\par \par # High grade papillary serous carcinoma of the Left ovary- Stage 1C3,  Capsule intact with +pelvic wash for malignant cells-  pT1c, pN0\par - S/P Carbo/Taxol x 6 cycles\par - Hgb is improving to 11 gm/dL and persistent leucopenia related to chemo with WBC 3.02 K/uL ANC 1.33 K/uL noted. Will continue to monitor\par - Signs and symptoms of disease recurrence discussed with the patient\par - Will do CMP and \par - Follow up with gyne, PCP and GI as scheduled\par - Recommended to continue getting screening test for mammogram and colonoscopy for health maintenance\par \par All her concerns were discussed during the visit\par RTC in 3 months\par \par Case was seen and discussed with Dr. Aviles who agreed with assessment and plan.\par \par \par \par \par \par \par

## 2021-06-26 NOTE — REVIEW OF SYSTEMS
[Negative] : Gastrointestinal [Muscle Pain] : no muscle pain [de-identified] : + mild neuropathy on fingers and feet, improving

## 2021-06-26 NOTE — HISTORY OF PRESENT ILLNESS
[de-identified] : 1/14/2021\par Pt is here to f/u for ovarian cancer, S/P Carbo/Taxol cycle 1 and Zarxio x 5 days.\par Pt is doing well today, denies N/V/D, no chest pain, no abdominal pain, no urinary symptoms. \par Pt reports intermittent mild spotting. Pt had 1 episode of nausea after the chemo and resolved after taking Loperamide, + numbness/tingling of bilateral feet on 1/1/21 with relief from pain meds, + chest discomfort on D3 of Zarxio, went to cardio Dr Teran and EKG was normal. Pt had decreased appetite  during the chemo but had improved this week. \par \par 2/8/21\par pt is here for follow up.\par S/p 2 cycles of chemo.\par She had developed mild neuropathy and was started on Gabapentin.\par Now on Gabapentin, she has improvement. I discussed possible dose reduction however pt does not want to reduce the dose.\par No abd pain, N, V, D\par \par 3/8/21 \par Pt presented today for routine f/u and treatment . She reports that her neuropathy has improved with gabapentin but sfter treatment she gets worsening of symptoms for 5 days . She is not sure whether its arthritis or neuropathy . \par Denies any abdominal pain nausea or vomiting . \par She is s/p 3 cycles , other then neuropathy , tolerating treatment well. \par \par 3/29/21 \par Pt presented today for f/u and to receive treatment . She reports feeling fine . Her neuropathy is not getting worse but she does c/o some tingling and numbness in fingers . \par No c/o nausea , vomiting or diarrhea . Tolerating treatment well.\par She was encouraged to receive covid vaccine . \par \par 4/19/21\par Pt presented for f/u and treatment today . She is s/p 5 cycles of carbo/ taxol for Stage 1C3 serous ovarian cancer . Tolerating treatment well except tingling and numbness , which she reports comes and goes . \par No c/o nausea ., vomiting or diarrhea . \par She will receive covid vaccine after finishing treatment . Requires GCSF support with chemo . \par \par 6/24/2021\par Pt is here to follow up for ovarian cancer\par She is s/p 6 cycles of carbo/taxol\par She feels well today, appetite is good\par She denies abdominal pain, nausea, vomiting or diarrhea\par She still have mild neuropathy on her fingertips and feet\par She received COVID vaccine x 2 doses [de-identified] : 70 year old female with PMH of CAD, Afib on eliquis, DM, HTN, DLD is here to discuss adjuvant therapy for new diagnosis of ovarian cancer. \par \par  Patient had episode of chest pain in August for which a CTA was performed. An incidental finding revealed a left adnexal mass. She then saw her OBGYN. A pelvic ultrasound was done, as well as a dedicated CT of Abdomen and Pelvis that showed a 8.3 x 8.8 x 7.4 cm left adnexal mass with solid components. 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 - was 15 prior to surgery. \par \par She has recovered well from surgery. She does not have any c/o abdominal pain, weight loss, GOPI. No c/o chest pain/ SOB. \par She is very active and lives with her family at home. \par She takes eliquis and ASA 81 for CAD and Afib \par She was getting B12 inj with PMD for low B12 \par \par H/o Colon cancer in 3 maternal aunts \par Last Mammogram and Colonoscopy- 2-3 months normal \par Radiology- 08/20- CTA- No CTA evidence of aortic dissection or intramural hematoma. 5.3 cm left adnexal cystic lesion. Recommend ultrasound prior to gadolinium enhanced MRI of the pelvis for further evaluation on an outpatient basis.\par \par 11/20- MRI pelvis- Complex cystic lesion lt adnexa with hemorrhagic debris. No pelvic or inguinal adenopathy. \par \par  \par Pathology: Final Diagnosis\par 1. Left tube and ovary (left pelvic mass):\par - Left ovary, high grade papillary serous carcinoma.\par - (The ovarian surface is involved by tumor).\par - Ovarian fibroma / thecoma\par - Fallopian tube, paratubal cyst\par \par 2. Cervix, uterus, right tube and ovary:\par - Uterine cervix, reactive changes\par - Endometrium, mainly atrophic\par - Uterus, leiomyoma\par - Right ovary, no involvement by carcinoma is seen.\par - Fallopian tube, paratubal cyst\par \par 3. Portion of omentum:\par - Omental adipose tissue, no involvement by carcinoma is seen.\par \par 4. Left pelvic nodes:\par - Lymph nodes, no metastatic carcinoma is seen (0/3).\par \par \par PELVIC FLUID:\par - POSITIVE FOR MALIGNANT CELLS.\par - Adenocarcinoma.\par \par \par PROCEDURE: Total hysterectomy and bilateral salpingo-\par oophorectomy, omentum, lymph nodes\par HYSTERECTOMY TYPE: Laparoscopic\par SPECIMEN INTEGRITY OF RIGHT OVARY: Capsule intact\par SPECIMEN INTEGRITY OF LEFT OVARY: Capsule intact\par SPECIMEN INTEGRITY OF RIGHT FALLOPIAN TUBE: Serosa intact\par SPECIMEN INTEGRITY OF LEFT FALLOPIAN TUBE: Serosa intact\par TUMOR SITE: Left ovary\par OVARIAN SURFACE INVOLVEMENT: Present\par FALLOPIAN TUBE SURFACE INVOLVEMENT: Absent\par TUMOR SIZE: Greatest dimension: 3.5 cm (solid /papillary\par component)\par Additional dimensions: 2.6 x 1.4 cm\par HISTOLOGIC TYPE: Serous carcinoma\par HISTOLOGIC GRADE: WHO GRADING SYSTEM: G3: Poorly differentiated\par TWO TIER GRADING SYSTEM: High grade\par IMPLANTS: n/a\par OTHER TISSUE/ORGAN INVOLVEMENT: Not identified\par LARGEST EXTRAPELVIC PERITONEAL FOCUS: n/a\par PERITONEAL / ASCITIC FLUID: Malignant\par TREATMENT EFFECT: No known presurgical therapy\par REGIONAL LYMPH NODES: All lymph nodes negative for tumor cells\par Number of lymph nodes examined: 3\par PATHOLOGIC STAGE CLASSIFICATION\par PRIMARY TUMOR: pT1c3: Malignant cells in ascites or\par peritoneal washings and ovarian surface involvement\par REGIONAL LYMPH NODES: pN0\par FIGO STAGE: IC3: Malignant cells present in the ascites or\par peritoneal washings\par ADDITIONAL PATHOLOGIC FINDINGS: ovarian fibroma/thecoma, uterine\par leiomyoma\par \par \par \par \par

## 2021-06-26 NOTE — PHYSICAL EXAM
[Fully active, able to carry on all pre-disease performance without restriction] : Status 0 - Fully active, able to carry on all pre-disease performance without restriction [Normal] : affect appropriate [Obese] : obese [de-identified] : Laparoscopy scars are healing well . Non tender

## 2021-06-28 DIAGNOSIS — D70.2 OTHER DRUG-INDUCED AGRANULOCYTOSIS: ICD-10-CM

## 2021-06-28 DIAGNOSIS — Z92.21 PERSONAL HISTORY OF ANTINEOPLASTIC CHEMOTHERAPY: ICD-10-CM

## 2021-07-08 ENCOUNTER — APPOINTMENT (OUTPATIENT)
Dept: CARDIOLOGY | Facility: CLINIC | Age: 71
End: 2021-07-08
Payer: MEDICARE

## 2021-07-08 VITALS
WEIGHT: 185 LBS | SYSTOLIC BLOOD PRESSURE: 116 MMHG | BODY MASS INDEX: 34.93 KG/M2 | HEART RATE: 79 BPM | TEMPERATURE: 96.9 F | HEIGHT: 61 IN | DIASTOLIC BLOOD PRESSURE: 75 MMHG

## 2021-07-08 PROCEDURE — 93000 ELECTROCARDIOGRAM COMPLETE: CPT

## 2021-07-08 PROCEDURE — 99213 OFFICE O/P EST LOW 20 MIN: CPT

## 2021-07-08 PROCEDURE — 99072 ADDL SUPL MATRL&STAF TM PHE: CPT

## 2021-07-08 RX ORDER — PROCHLORPERAZINE MALEATE 10 MG/1
10 TABLET ORAL EVERY 6 HOURS
Qty: 40 | Refills: 3 | Status: COMPLETED | COMMUNITY
Start: 2020-12-21 | End: 2021-07-08

## 2021-07-08 RX ORDER — LOPERAMIDE HYDROCHLORIDE 2 MG/1
2 CAPSULE ORAL
Qty: 240 | Refills: 0 | Status: COMPLETED | COMMUNITY
Start: 2020-12-21 | End: 2021-07-08

## 2021-07-08 RX ORDER — ONDANSETRON 4 MG/1
4 TABLET, ORALLY DISINTEGRATING ORAL 3 TIMES DAILY
Qty: 90 | Refills: 3 | Status: COMPLETED | COMMUNITY
Start: 2020-12-21 | End: 2021-07-08

## 2021-07-08 RX ORDER — GABAPENTIN 100 MG/1
100 CAPSULE ORAL 3 TIMES DAILY
Qty: 90 | Refills: 1 | Status: COMPLETED | COMMUNITY
Start: 2021-02-01 | End: 2021-07-08

## 2021-07-08 NOTE — ASSESSMENT
[FreeTextEntry1] : 70 years old female with pmh of HTN, CAD, S/P PTCA with stent 2002, DM, Symptomatic   PAF dx in 10/2018 \par on Sotalol 80 mg BID for rhythm control \par \par Reports feeling well, tolerating meds well \par \par Plan \par 1- Continue  Eliquis 5 mg BID ( CHADS- VASc - 4, CAD, DM, FEMALE, HTN ) \par 2. Continue Sotalol 80 mg BID \par 3 labs reviewed\par

## 2021-07-08 NOTE — HISTORY OF PRESENT ILLNESS
[FreeTextEntry1] : 71 years old female with PAF dx in 10/2018 , on Sotalol 80 mg BID and Eliqus 10/31/2018 she returns for a follow up . \par \par She denies CP/SOB.  Reports rare palpitations , brief in duration lasting seconds .  \par No dizziness, or syncope .Good activity tolerance \par Denies PEREZ \par \par Patient is doing very well on sotalol.\par \par EKG SR 75 bpm Qtc 440\par ECG ( 3/21/2019) - NSR at 81 bpm, QTc 432 ms , non specific TWI \par ECG ( 5/14/2018)- NSR at 66 bpm, normal AK, QRS, QTc 444 ms

## 2021-08-16 ENCOUNTER — APPOINTMENT (OUTPATIENT)
Dept: CARDIOLOGY | Facility: CLINIC | Age: 71
End: 2021-08-16
Payer: MEDICARE

## 2021-08-16 VITALS
DIASTOLIC BLOOD PRESSURE: 80 MMHG | WEIGHT: 188 LBS | HEART RATE: 79 BPM | TEMPERATURE: 97.8 F | BODY MASS INDEX: 35.5 KG/M2 | HEIGHT: 61 IN | SYSTOLIC BLOOD PRESSURE: 120 MMHG

## 2021-08-16 PROCEDURE — 99213 OFFICE O/P EST LOW 20 MIN: CPT

## 2021-08-16 PROCEDURE — 93000 ELECTROCARDIOGRAM COMPLETE: CPT

## 2021-09-27 ENCOUNTER — RX RENEWAL (OUTPATIENT)
Age: 71
End: 2021-09-27

## 2021-10-15 ENCOUNTER — APPOINTMENT (OUTPATIENT)
Dept: GYNECOLOGIC ONCOLOGY | Facility: CLINIC | Age: 71
End: 2021-10-15
Payer: MEDICARE

## 2021-10-15 VITALS
WEIGHT: 188 LBS | HEIGHT: 61 IN | TEMPERATURE: 97.5 F | SYSTOLIC BLOOD PRESSURE: 128 MMHG | BODY MASS INDEX: 35.5 KG/M2 | DIASTOLIC BLOOD PRESSURE: 78 MMHG

## 2021-10-15 PROCEDURE — 99213 OFFICE O/P EST LOW 20 MIN: CPT

## 2021-10-15 NOTE — HISTORY OF PRESENT ILLNESS
[FreeTextEntry1] : 71 year old  patient of Dr. Ham,  9NSVD x2).  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  Pathology reported FIGo 1 C3 (positive cytology).  She was given 6 cycles of chemotherapy by Dr. Aviles  She continues on Eloquis 5mg qd for A-Fib..  Treatment was completed in 2021.   is negative (10).  She presents for follow-up.

## 2021-10-15 NOTE — ASSESSMENT
[FreeTextEntry1] : 71 year old  patient of Dr. Ham,  9NSVD x2).  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  Pathology reported FIGo 1 C3 (positive cytology).  She was given 6 cycles of chemotherapy by Dr. Aviles  She continues on Eloquis 5mg qd for A-Fib..  Treatment was completed in 2021.   is negative (10).  She presents for follow-up.  \par The patient appears to be doing well with no clinical evidence of disease.\par

## 2021-11-01 ENCOUNTER — RX RENEWAL (OUTPATIENT)
Age: 71
End: 2021-11-01

## 2021-12-15 ENCOUNTER — APPOINTMENT (OUTPATIENT)
Dept: CARDIOLOGY | Facility: CLINIC | Age: 71
End: 2021-12-15
Payer: MEDICARE

## 2021-12-15 PROCEDURE — 93000 ELECTROCARDIOGRAM COMPLETE: CPT

## 2021-12-15 PROCEDURE — 99214 OFFICE O/P EST MOD 30 MIN: CPT

## 2021-12-20 ENCOUNTER — LABORATORY RESULT (OUTPATIENT)
Age: 71
End: 2021-12-20

## 2021-12-20 ENCOUNTER — OUTPATIENT (OUTPATIENT)
Dept: OUTPATIENT SERVICES | Facility: HOSPITAL | Age: 71
LOS: 1 days | Discharge: HOME | End: 2021-12-20

## 2021-12-20 ENCOUNTER — APPOINTMENT (OUTPATIENT)
Dept: HEMATOLOGY ONCOLOGY | Facility: CLINIC | Age: 71
End: 2021-12-20
Payer: MEDICARE

## 2021-12-20 VITALS
SYSTOLIC BLOOD PRESSURE: 154 MMHG | DIASTOLIC BLOOD PRESSURE: 69 MMHG | RESPIRATION RATE: 16 BRPM | HEART RATE: 83 BPM | HEIGHT: 61 IN

## 2021-12-20 DIAGNOSIS — Z90.49 ACQUIRED ABSENCE OF OTHER SPECIFIED PARTS OF DIGESTIVE TRACT: Chronic | ICD-10-CM

## 2021-12-20 DIAGNOSIS — Z95.5 PRESENCE OF CORONARY ANGIOPLASTY IMPLANT AND GRAFT: Chronic | ICD-10-CM

## 2021-12-20 LAB
HCT VFR BLD CALC: 35.2 %
HGB BLD-MCNC: 11.7 G/DL
MCHC RBC-ENTMCNC: 31.5 PG
MCHC RBC-ENTMCNC: 33.2 G/DL
MCV RBC AUTO: 94.6 FL
PLATELET # BLD AUTO: 199 K/UL
PMV BLD: 10.1 FL
RBC # BLD: 3.72 M/UL
RBC # FLD: 13.4 %
WBC # FLD AUTO: 3.25 K/UL

## 2021-12-20 PROCEDURE — 99213 OFFICE O/P EST LOW 20 MIN: CPT

## 2021-12-21 LAB
ALBUMIN SERPL ELPH-MCNC: 4.3 G/DL
ALP BLD-CCNC: 65 U/L
ALT SERPL-CCNC: 16 U/L
ANION GAP SERPL CALC-SCNC: 16 MMOL/L
AST SERPL-CCNC: 20 U/L
BILIRUB SERPL-MCNC: 1.5 MG/DL
BUN SERPL-MCNC: 20 MG/DL
CALCIUM SERPL-MCNC: 9.6 MG/DL
CANCER AG125 SERPL-ACNC: 8 U/ML
CHLORIDE SERPL-SCNC: 105 MMOL/L
CO2 SERPL-SCNC: 22 MMOL/L
CREAT SERPL-MCNC: 0.7 MG/DL
GLUCOSE SERPL-MCNC: 104 MG/DL
POTASSIUM SERPL-SCNC: 4.5 MMOL/L
PROT SERPL-MCNC: 7.1 G/DL
SODIUM SERPL-SCNC: 143 MMOL/L

## 2021-12-22 NOTE — ASSESSMENT
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

## 2021-12-22 NOTE — REVIEW OF SYSTEMS
[Negative] : Heme/Lymph [Muscle Pain] : no muscle pain [de-identified] : + mild neuropathy on fingers and feet, improving

## 2021-12-22 NOTE — HISTORY OF PRESENT ILLNESS
[de-identified] : 70 year old female with PMH of CAD, Afib on eliquis, DM, HTN, DLD is here to discuss adjuvant therapy for new diagnosis of ovarian cancer. \par \par  Patient had episode of chest pain in August for which a CTA was performed. An incidental finding revealed a left adnexal mass. She then saw her OBGYN. A pelvic ultrasound was done, as well as a dedicated CT of Abdomen and Pelvis that showed a 8.3 x 8.8 x 7.4 cm left adnexal mass with solid components. 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 - was 15 prior to surgery. \par \par She has recovered well from surgery. She does not have any c/o abdominal pain, weight loss, GOPI. No c/o chest pain/ SOB. \par She is very active and lives with her family at home. \par She takes eliquis and ASA 81 for CAD and Afib \par She was getting B12 inj with PMD for low B12 \par \par H/o Colon cancer in 3 maternal aunts \par Last Mammogram and Colonoscopy- 2-3 months normal \par Radiology- 08/20- CTA- No CTA evidence of aortic dissection or intramural hematoma. 5.3 cm left adnexal cystic lesion. Recommend ultrasound prior to gadolinium enhanced MRI of the pelvis for further evaluation on an outpatient basis.\par \par 11/20- MRI pelvis- Complex cystic lesion lt adnexa with hemorrhagic debris. No pelvic or inguinal adenopathy. \par \par  \par Pathology: Final Diagnosis\par 1. Left tube and ovary (left pelvic mass):\par - Left ovary, high grade papillary serous carcinoma.\par - (The ovarian surface is involved by tumor).\par - Ovarian fibroma / thecoma\par - Fallopian tube, paratubal cyst\par \par 2. Cervix, uterus, right tube and ovary:\par - Uterine cervix, reactive changes\par - Endometrium, mainly atrophic\par - Uterus, leiomyoma\par - Right ovary, no involvement by carcinoma is seen.\par - Fallopian tube, paratubal cyst\par \par 3. Portion of omentum:\par - Omental adipose tissue, no involvement by carcinoma is seen.\par \par 4. Left pelvic nodes:\par - Lymph nodes, no metastatic carcinoma is seen (0/3).\par \par \par PELVIC FLUID:\par - POSITIVE FOR MALIGNANT CELLS.\par - Adenocarcinoma.\par \par \par PROCEDURE: Total hysterectomy and bilateral salpingo-\par oophorectomy, omentum, lymph nodes\par HYSTERECTOMY TYPE: Laparoscopic\par SPECIMEN INTEGRITY OF RIGHT OVARY: Capsule intact\par SPECIMEN INTEGRITY OF LEFT OVARY: Capsule intact\par SPECIMEN INTEGRITY OF RIGHT FALLOPIAN TUBE: Serosa intact\par SPECIMEN INTEGRITY OF LEFT FALLOPIAN TUBE: Serosa intact\par TUMOR SITE: Left ovary\par OVARIAN SURFACE INVOLVEMENT: Present\par FALLOPIAN TUBE SURFACE INVOLVEMENT: Absent\par TUMOR SIZE: Greatest dimension: 3.5 cm (solid /papillary\par component)\par Additional dimensions: 2.6 x 1.4 cm\par HISTOLOGIC TYPE: Serous carcinoma\par HISTOLOGIC GRADE: WHO GRADING SYSTEM: G3: Poorly differentiated\par TWO TIER GRADING SYSTEM: High grade\par IMPLANTS: n/a\par OTHER TISSUE/ORGAN INVOLVEMENT: Not identified\par LARGEST EXTRAPELVIC PERITONEAL FOCUS: n/a\par PERITONEAL / ASCITIC FLUID: Malignant\par TREATMENT EFFECT: No known presurgical therapy\par REGIONAL LYMPH NODES: All lymph nodes negative for tumor cells\par Number of lymph nodes examined: 3\par PATHOLOGIC STAGE CLASSIFICATION\par PRIMARY TUMOR: pT1c3: Malignant cells in ascites or\par peritoneal washings and ovarian surface involvement\par REGIONAL LYMPH NODES: pN0\par FIGO STAGE: IC3: Malignant cells present in the ascites or\par peritoneal washings\par ADDITIONAL PATHOLOGIC FINDINGS: ovarian fibroma/thecoma, uterine\par leiomyoma\par \par \par \par \par  [de-identified] : 1/14/2021\par Pt is here to f/u for ovarian cancer, S/P Carbo/Taxol cycle 1 and Zarxio x 5 days.\par Pt is doing well today, denies N/V/D, no chest pain, no abdominal pain, no urinary symptoms. \par Pt reports intermittent mild spotting. Pt had 1 episode of nausea after the chemo and resolved after taking Loperamide, + numbness/tingling of bilateral feet on 1/1/21 with relief from pain meds, + chest discomfort on D3 of Zarxio, went to cardio Dr Teran and EKG was normal. Pt had decreased appetite  during the chemo but had improved this week. \par \par 2/8/21\par pt is here for follow up.\par S/p 2 cycles of chemo.\par She had developed mild neuropathy and was started on Gabapentin.\par Now on Gabapentin, she has improvement. I discussed possible dose reduction however pt does not want to reduce the dose.\par No abd pain, N, V, D\par \par 3/8/21 \par Pt presented today for routine f/u and treatment . She reports that her neuropathy has improved with gabapentin but sfter treatment she gets worsening of symptoms for 5 days . She is not sure whether its arthritis or neuropathy . \par Denies any abdominal pain nausea or vomiting . \par She is s/p 3 cycles , other then neuropathy , tolerating treatment well. \par \par 3/29/21 \par Pt presented today for f/u and to receive treatment . She reports feeling fine . Her neuropathy is not getting worse but she does c/o some tingling and numbness in fingers . \par No c/o nausea , vomiting or diarrhea . Tolerating treatment well.\par She was encouraged to receive covid vaccine . \par \par 4/19/21\par Pt presented for f/u and treatment today . She is s/p 5 cycles of carbo/ taxol for Stage 1C3 serous ovarian cancer . Tolerating treatment well except tingling and numbness , which she reports comes and goes . \par No c/o nausea ., vomiting or diarrhea . \par She will receive covid vaccine after finishing treatment . Requires GCSF support with chemo . \par \par 6/24/2021\par Pt is here to follow up for ovarian cancer\par She is s/p 6 cycles of carbo/taxol\par She feels well today, appetite is good\par She denies abdominal pain, nausea, vomiting or diarrhea\par She still have mild neuropathy on her fingertips and feet\par She received COVID vaccine x 2 doses\par \par 12/20/21\par Pt is here for follow up. Her neuropathy has improved. She has been taking alpha lipoic acid.\par No abd pain, N, v, d, vag bleeding

## 2021-12-27 DIAGNOSIS — C56.2 MALIGNANT NEOPLASM OF LEFT OVARY: ICD-10-CM

## 2022-01-06 ENCOUNTER — APPOINTMENT (OUTPATIENT)
Dept: CARDIOLOGY | Facility: CLINIC | Age: 72
End: 2022-01-06
Payer: COMMERCIAL

## 2022-01-06 VITALS
DIASTOLIC BLOOD PRESSURE: 80 MMHG | TEMPERATURE: 97.6 F | HEIGHT: 61 IN | WEIGHT: 193 LBS | RESPIRATION RATE: 16 BRPM | HEART RATE: 79 BPM | BODY MASS INDEX: 36.44 KG/M2 | SYSTOLIC BLOOD PRESSURE: 125 MMHG

## 2022-01-06 PROCEDURE — 93000 ELECTROCARDIOGRAM COMPLETE: CPT

## 2022-01-06 PROCEDURE — 99214 OFFICE O/P EST MOD 30 MIN: CPT

## 2022-01-06 RX ORDER — OXYCODONE AND ACETAMINOPHEN 5; 325 MG/1; MG/1
5-325 TABLET ORAL
Qty: 56 | Refills: 0 | Status: DISCONTINUED | COMMUNITY
Start: 2020-12-31 | End: 2022-01-06

## 2022-01-06 NOTE — HISTORY OF PRESENT ILLNESS
[FreeTextEntry1] : 71 years old female with PAF dx in 10/2018 , on Sotalol 80 mg BID and Eliqus 10/31/2018 she returns for a follow up . \par \par She denies CP/SOB.  Reports rare palpitations , brief in duration lasting seconds .  \par No dizziness, or syncope .Good activity tolerance \par Denies PEREZ  Pt finished chemo for ovarian CA\par \par Patient is doing very well on sotalol.\par \par EKG SR 79 bpm Qtc 440\par ECG ( 3/21/2019) - NSR at 81 bpm, QTc 432 ms , non specific TWI \par ECG ( 5/14/2018)- NSR at 66 bpm, normal ME, QRS, QTc 444 ms

## 2022-01-06 NOTE — ASSESSMENT
[FreeTextEntry1] : 71 years old female with pmh of HTN, CAD, S/P PTCA with stent 2002, DM, Symptomatic   PAF dx in 10/2018 \par on Sotalol 80 mg BID for rhythm control \par \par Reports feeling well, tolerating meds well \par \par Plan \par 1- Continue  Eliquis 5 mg BID ( CHADS- VASc - 4, CAD, DM, FEMALE, HTN ); no bleeding\par 2. Continue Sotalol 80 mg BID \par 3 labs reviewed creatine normal \par

## 2022-01-10 NOTE — PAST MEDICAL HISTORY
[address] [facility] Name: Zhanna Luther  MRN: [id number]  [place] 1956  Date of evaluation: 1/29/2021  Provider: Ash Vigil DO    CHIEF COMPLAINT       Chief Complaint   Patient presents with    Fall     Patient tripped over a seizure pad around 630am today. Patient fell onto her knees with right hand striking table on the way down, complains of right lateral hand pain with pain to 4th and 5th digit. HISTORY OF PRESENT ILLNESS   (Location/Symptom, Timing/Onset, Context/Setting, Quality, Duration, Modifying Factors, Severity)  Note limiting factors. Zhanna Luther is a 59 y.o. female who presents to the emergency department complaining of pain over the fourth and fifth metacarpal/digit, after a fall. Patient fell while at work, tripped over a seizure pad at her facility she fell striking her hand on a table when she fell. Did not hit head did not lose consciousness denies neck or back pain, denying other musculoskeletal complaints. Patient has intact sensation. Patient believes she may have dislocated her fifth digit, she pulled on it and improved alignment. Patient presents now for evaluation for potential fracture does have pain and swelling mainly over the fifth metacarpal as well as fifth digit. Nursing Notes were reviewed.     PAST MEDICAL HISTORY     Past Medical History:   Diagnosis Date    Allergic rhinoconjunctivitis     Benign essential hypertension     Chronic back pain     OA, DDD    Food allergy     multiple, Dr Rajesh Linn GERD without esophagitis     Migraine     BEA on CPAP     Dr Sulma Lemons Peptic ulcer disease     Routine health maintenance      [facility] 10/10 ([facility])    Type 2 diabetes mellitus without complication ([facility] [address]     Vertigo, episodic recurrent          SURGICAL HISTORY       Past Surgical History:   Procedure Laterality Date    CHOLECYSTECTOMY      HYSTERECTOMY  MASTOIDECTOMY           CURRENT MEDICATIONS       Previous Medications    DICLOFENAC SODIUM 1 % GEL    APPLY 2 TO 4 GRAMS TOPICALLY FOUR TIMES A DAY AS NEEDED FOR PAIN    FLUCONAZOLE (DIFLUCAN) 200 MG TABLET    Take 200 mg by mouth daily    HYDROCHLOROTHIAZIDE (HYDRODIURIL) 25 MG TABLET    TAKE 1 TABLET DAILY    MATZIM  MG TB24 EXTENDED RELEASE TABLET    Take 1 tablet daily    MELOXICAM (MOBIC) 15 MG TABLET    TAKE 1 TABLET DAILY    METAXALONE (SKELAXIN) 800 MG TABLET    TAKE 1 TABLET FOUR TIMES A DAY    PANTOPRAZOLE (PROTONIX) 40 MG TABLET    TAKE 1 TABLET DAILY    POTASSIUM CHLORIDE (KLOR-CON) 10 MEQ EXTENDED RELEASE TABLET    TAKE 3 TABLETS DAILY    TRULICITY 5.79 BARTLETT/8.5KG SOPN    INJECT 0.75 MG UNDER THE SKIN ONCE WEEKLY       ALLERGIES     Lisinopril, Nasacort [triamcinolone], Feldene [piroxicam], Bactrim [sulfamethoxazole-trimethoprim], Codeine, Daypro [oxaprozin], Metformin and related, Morphine, Naproxen, Neomycin sulfate, Nitrofurantoin anhydrous, Penicillins, Tramadol, and Ppd    FAMILY HISTORY     History reviewed. No pertinent family history.        SOCIAL HISTORY       Social History     Socioeconomic History    Marital status:      Spouse name: None    Number of children: None    Years of education: None    Highest education level: None   Occupational History    None   Social Needs    Financial resource strain: None    Food insecurity     Worry: None     Inability: None    Transportation needs     Medical: None     Non-medical: None   Tobacco Use    Smoking status: Never Smoker    Smokeless tobacco: Never Used   Substance and Sexual Activity    Alcohol use: Never     Frequency: Never    Drug use: Never    Sexual activity: Not Currently   Lifestyle    Physical activity     Days per week: None     Minutes per session: None    Stress: None   Relationships    Social connections     Talks on phone: None     Gets together: None     Attends Confucianism service: None Active member of club or organization: None     Attends meetings of clubs or organizations: None     Relationship status: None    Intimate partner violence     Fear of current or ex partner: None     Emotionally abused: None     Physically abused: None     Forced sexual activity: None   Other Topics Concern    None   Social History Narrative    None       SCREENINGS                            REVIEW OF SYSTEMS    (2-9 systems for level 4, 10 or more for level 5)   Review of Systems   Constitutional: Negative. Respiratory: Negative. Cardiovascular: Negative. Musculoskeletal:        Right dorsal hand pain and swelling         PHYSICAL EXAM    (up to 7 for level 4, 8 or more for level 5)     ED Triage Vitals   BP Temp Temp src Pulse Resp SpO2 Height Weight   -- -- -- -- -- -- -- --       Physical Exam  Constitutional:       Appearance: Normal appearance. Cardiovascular:      Rate and Rhythm: Normal rate. Pulmonary:      Effort: Pulmonary effort is normal.   Musculoskeletal:      Comments: Right upper extremity: No tenderness to palpation at shoulder elbow wrist.  Right hand there is mild swelling over the dorsal fourth and fifth metacarpal patient able to fully flex and extend digits. Neurovascular intact distally. No gross deformity. Does have tenderness over the fifth MCP joint and fourth/fifth metacarpal   Neurological:      Mental Status: She is alert. DIAGNOSTIC RESULTS     EKG: All EKG's are interpreted by the Emergency Department Physician who either signs or Co-signs this chart in the absence of a cardiologist.      RADIOLOGY:   Non-plain film images such as CT, Ultrasound and MRI are read by the radiologist. Plain radiographic images are visualized and preliminarily interpreted by the emergency physician.     Interpretation per the Radiologist below, if available at the time of this note:    XR HAND RIGHT (MIN 3 VIEWS)   Final Result   Fracture of the 5th metacarpal. [Postmenopausal] : The patient is postmenopausal LABS:  Labs Reviewed - No data to display    All other labs were within normal range or not returned as of this dictation. EMERGENCY DEPARTMENT COURSE and DIFFERENTIAL DIAGNOSIS/MDM:   Taye Ash is a 59 y.o. female who presents to the emergency department with the complaint of right hand pain, after mechanical fall no other injuries. On exam patient's right hand has had mild swelling over the fourth fifth metacarpal patient able to fully make a fist and extend digits. Neurovascular tact distally. No gross deformity. Will obtain plain film imaging to rule out fracture. Patient with fracture of the fifth metacarpal.  Will place an ulnar gutter splint. Will have patient follow-up with orthopedics. CRITICAL CARE TIME   Total Critical Care time was 0 minutes, excluding separately reportable procedures. There was a high probability of clinically significant/life threatening deterioration in the patient's condition which required my urgent intervention. Clinical concern   Intervention     CONSULTS:  None    PROCEDURES:  Unless otherwise noted below, none     Splint Application    Date/Time: 1/29/2021 9:24 AM  Performed by: Rafaela Cortes DO  Authorized by: Rafaela Cortes DO     Consent:     Consent obtained:  Verbal    Consent given by:  Patient    Risks discussed:  Discoloration, numbness and pain    Alternatives discussed:  No treatment  Pre-procedure details:     Sensation:  Normal    Skin color:  Pink  Procedure details:     Laterality:  Right    Splint type:  Ulnar gutter    Supplies:  Ortho-Glass  Post-procedure details:     Pain:  Unchanged    Sensation:  Normal    Skin color:  Pink    Patient tolerance of procedure:   Tolerated well, no immediate complications  Comments:      Splint applied by paramedic, splint was reevaluated by myself, intact neurovascular exam.            FINAL IMPRESSION [Menarche Age ____] : age at menarche was [unfilled] 1. Closed fracture of fifth metacarpal bone of right hand, unspecified fracture morphology, initial encounter          DISPOSITION/PLAN   DISPOSITION  dc      PATIENT REFERRED TO:  [address] [facility] Emergency Department  [address]    If symptoms worsen    Natasha South MD  [facility] [address] [facility] [address] [facility]  [phone]    Schedule an appointment as soon as possible for a visit   Hand fracture      DISCHARGE MEDICATIONS:  New Prescriptions    OXYCODONE-ACETAMINOPHEN (PERCOCET) 5-325 MG PER TABLET    Take 1 tablet by mouth every 8 hours as needed for Pain for up to 3 days. Intended supply: 3 days. Take lowest dose possible to manage pain     Controlled Substances Monitoring:     No flowsheet data found.     (Please note that portions of this note were completed with a voice recognition program.  Efforts were made to edit the dictations but occasionally words are mis-transcribed.)    Risa Marie DO (electronically signed)  Attending Emergency Physician            Risa Marie DO  01/29/21 2975 [Menopause Age____] : age at menopause was [unfilled] [Total Preg ___] : G[unfilled] [Live Births ___] : P[unfilled]  [Full Term ___] : Full Term: [unfilled] [Living ___] : Living: [unfilled]

## 2022-01-11 ENCOUNTER — APPOINTMENT (OUTPATIENT)
Dept: GYNECOLOGIC ONCOLOGY | Facility: CLINIC | Age: 72
End: 2022-01-11
Payer: MEDICARE

## 2022-01-11 VITALS
BODY MASS INDEX: 36.44 KG/M2 | SYSTOLIC BLOOD PRESSURE: 118 MMHG | HEIGHT: 61 IN | DIASTOLIC BLOOD PRESSURE: 70 MMHG | TEMPERATURE: 96.8 F | WEIGHT: 193 LBS

## 2022-01-11 DIAGNOSIS — G62.9 POLYNEUROPATHY, UNSPECIFIED: ICD-10-CM

## 2022-01-11 PROCEDURE — 99213 OFFICE O/P EST LOW 20 MIN: CPT

## 2022-01-11 NOTE — HISTORY OF PRESENT ILLNESS
[FreeTextEntry1] : 71 year old  patient of Dr. Ham,  ( x2).  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  Pathology reported FIGO 1 C3 (positive cytology).  She was given 6 cycles of chemotherapy by Dr. Aviles  She continues on Eloquis 5mg qd for A-Fib..  Treatment was completed in 2021.   is negative (8).  She presents for follow-up.

## 2022-01-11 NOTE — ASSESSMENT
[FreeTextEntry1] : 71 year old  patient of Dr. 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  Pathology reported FIGo 1 C3 (positive cytology).  She was given 6 cycles of chemotherapy by Dr. Aviles  She continues on Eloquis 5mg qd for A-Fib..  Treatment was completed in 2021.   is negative (8).  She presents for follow-up.  \par The patient appears to be doing well with no clinical evidence of disease.\par

## 2022-02-15 NOTE — HISTORY OF PRESENT ILLNESS
[FreeTextEntry1] : Ms. DERRICK DORMAN has given me verbal authorization to provide the tele services\par Verbal consent given on 06/03/2020  by the patient.\par \par This visit was provided via telehealth using real-time 2-way audio visual technology. The patient,  Ms. DERRICK DORMAN,   was located at home,  [address] [place] [address]\par [place] [phone], at the time of the visit. \par \par The patient, Ms. DERRICK DORMAN  and Provider participated in the telehealth encounter. \par \par I have spent 21 minutes speaking with or face-to-face discussing\par \par 70 years old female with PAF dx in 10/2018 , on Sotalol 80 mg BID and Eliqus 10/31/2018 she returns for a follow up . \par \par She denies CP/SOB.  Reports rare palpitations , brief in duration lasting seconds .  \par No dizziness, or syncope .Good activity tolerance \par Denies PEREZ \par \par EKG SR 75 bpm Qtc 440\par ECG ( 3/21/2019) - NSR at 81 bpm, QTc 432 ms , non specific TWI \par ECG ( 5/14/2018)- NSR at 66 bpm, normal AR, QRS, QTc 444 ms  Odomzo Counseling- I discussed with the patient the risks of Odomzo including but not limited to nausea, vomiting, diarrhea, constipation, weight loss, changes in the sense of taste, decreased appetite, muscle spasms, and hair loss.  The patient verbalized understanding of the proper use and possible adverse effects of Odomzo.  All of the patient's questions and concerns were addressed.

## 2022-03-01 ENCOUNTER — APPOINTMENT (OUTPATIENT)
Dept: CARDIOLOGY | Facility: CLINIC | Age: 72
End: 2022-03-01
Payer: MEDICARE

## 2022-03-01 VITALS
HEIGHT: 61 IN | SYSTOLIC BLOOD PRESSURE: 106 MMHG | HEART RATE: 85 BPM | BODY MASS INDEX: 34.93 KG/M2 | WEIGHT: 185 LBS | DIASTOLIC BLOOD PRESSURE: 70 MMHG

## 2022-03-01 PROCEDURE — 99214 OFFICE O/P EST MOD 30 MIN: CPT

## 2022-03-01 PROCEDURE — 93000 ELECTROCARDIOGRAM COMPLETE: CPT

## 2022-03-01 PROCEDURE — 93880 EXTRACRANIAL BILAT STUDY: CPT

## 2022-04-06 ENCOUNTER — NON-APPOINTMENT (OUTPATIENT)
Age: 72
End: 2022-04-06

## 2022-04-06 DIAGNOSIS — Z83.3 FAMILY HISTORY OF DIABETES MELLITUS: ICD-10-CM

## 2022-04-06 DIAGNOSIS — Z92.89 PERSONAL HISTORY OF OTHER MEDICAL TREATMENT: ICD-10-CM

## 2022-04-06 DIAGNOSIS — Z82.49 FAMILY HISTORY OF ISCHEMIC HEART DISEASE AND OTHER DISEASES OF THE CIRCULATORY SYSTEM: ICD-10-CM

## 2022-04-06 DIAGNOSIS — Z78.0 ASYMPTOMATIC MENOPAUSAL STATE: ICD-10-CM

## 2022-04-06 DIAGNOSIS — Z01.419 ENCOUNTER FOR GYNECOLOGICAL EXAMINATION (GENERAL) (ROUTINE) W/OUT ABNORMAL FINDINGS: ICD-10-CM

## 2022-04-06 DIAGNOSIS — Z78.9 OTHER SPECIFIED HEALTH STATUS: ICD-10-CM

## 2022-04-25 ENCOUNTER — OUTPATIENT (OUTPATIENT)
Dept: OUTPATIENT SERVICES | Facility: HOSPITAL | Age: 72
LOS: 1 days | Discharge: HOME | End: 2022-04-25

## 2022-04-25 ENCOUNTER — APPOINTMENT (OUTPATIENT)
Dept: HEMATOLOGY ONCOLOGY | Facility: CLINIC | Age: 72
End: 2022-04-25
Payer: MEDICARE

## 2022-04-25 ENCOUNTER — LABORATORY RESULT (OUTPATIENT)
Age: 72
End: 2022-04-25

## 2022-04-25 VITALS
DIASTOLIC BLOOD PRESSURE: 58 MMHG | SYSTOLIC BLOOD PRESSURE: 129 MMHG | TEMPERATURE: 97.8 F | HEIGHT: 61 IN | BODY MASS INDEX: 35.87 KG/M2 | HEART RATE: 73 BPM | RESPIRATION RATE: 18 BRPM | WEIGHT: 190 LBS

## 2022-04-25 DIAGNOSIS — Z95.5 PRESENCE OF CORONARY ANGIOPLASTY IMPLANT AND GRAFT: Chronic | ICD-10-CM

## 2022-04-25 DIAGNOSIS — Z90.49 ACQUIRED ABSENCE OF OTHER SPECIFIED PARTS OF DIGESTIVE TRACT: Chronic | ICD-10-CM

## 2022-04-25 LAB
ALBUMIN SERPL ELPH-MCNC: 4.1 G/DL
ALP BLD-CCNC: 58 U/L
ALT SERPL-CCNC: 19 U/L
ANION GAP SERPL CALC-SCNC: 10 MMOL/L
AST SERPL-CCNC: 22 U/L
BILIRUB SERPL-MCNC: 1.6 MG/DL
BUN SERPL-MCNC: 26 MG/DL
CALCIUM SERPL-MCNC: 9.5 MG/DL
CHLORIDE SERPL-SCNC: 104 MMOL/L
CO2 SERPL-SCNC: 26 MMOL/L
CREAT SERPL-MCNC: 0.8 MG/DL
EGFR: 78 ML/MIN/1.73M2
GLUCOSE SERPL-MCNC: 127 MG/DL
HCT VFR BLD CALC: 36 %
HGB BLD-MCNC: 12.1 G/DL
MCHC RBC-ENTMCNC: 32.3 PG
MCHC RBC-ENTMCNC: 33.6 G/DL
MCV RBC AUTO: 96 FL
PLATELET # BLD AUTO: 209 K/UL
PMV BLD: 10.3 FL
POTASSIUM SERPL-SCNC: 5 MMOL/L
PROT SERPL-MCNC: 6.8 G/DL
RBC # BLD: 3.75 M/UL
RBC # FLD: 13.9 %
SODIUM SERPL-SCNC: 140 MMOL/L
WBC # FLD AUTO: 4.09 K/UL

## 2022-04-25 PROCEDURE — 99213 OFFICE O/P EST LOW 20 MIN: CPT

## 2022-04-25 NOTE — ASSESSMENT
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

## 2022-04-25 NOTE — PHYSICAL EXAM
[Fully active, able to carry on all pre-disease performance without restriction] : Status 0 - Fully active, able to carry on all pre-disease performance without restriction [Obese] : obese [Normal] : affect appropriate [de-identified] : Laparoscopy scars are healing well . Non tender

## 2022-04-25 NOTE — HISTORY OF PRESENT ILLNESS
[de-identified] : 70 year old female with PMH of CAD, Afib on eliquis, DM, HTN, DLD is here to discuss adjuvant therapy for new diagnosis of ovarian cancer. \par \par  Patient had episode of chest pain in August for which a CTA was performed. An incidental finding revealed a left adnexal mass. She then saw her OBGYN. A pelvic ultrasound was done, as well as a dedicated CT of Abdomen and Pelvis that showed a 8.3 x 8.8 x 7.4 cm left adnexal mass with solid components. 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 - was 15 prior to surgery. \par \par She has recovered well from surgery. She does not have any c/o abdominal pain, weight loss, GOPI. No c/o chest pain/ SOB. \par She is very active and lives with her family at home. \par She takes eliquis and ASA 81 for CAD and Afib \par She was getting B12 inj with PMD for low B12 \par \par H/o Colon cancer in 3 maternal aunts \par Last Mammogram and Colonoscopy- 2-3 months normal \par Radiology- 08/20- CTA- No CTA evidence of aortic dissection or intramural hematoma. 5.3 cm left adnexal cystic lesion. Recommend ultrasound prior to gadolinium enhanced MRI of the pelvis for further evaluation on an outpatient basis.\par \par 11/20- MRI pelvis- Complex cystic lesion lt adnexa with hemorrhagic debris. No pelvic or inguinal adenopathy. \par \par  \par Pathology: Final Diagnosis\par 1. Left tube and ovary (left pelvic mass):\par - Left ovary, high grade papillary serous carcinoma.\par - (The ovarian surface is involved by tumor).\par - Ovarian fibroma / thecoma\par - Fallopian tube, paratubal cyst\par \par 2. Cervix, uterus, right tube and ovary:\par - Uterine cervix, reactive changes\par - Endometrium, mainly atrophic\par - Uterus, leiomyoma\par - Right ovary, no involvement by carcinoma is seen.\par - Fallopian tube, paratubal cyst\par \par 3. Portion of omentum:\par - Omental adipose tissue, no involvement by carcinoma is seen.\par \par 4. Left pelvic nodes:\par - Lymph nodes, no metastatic carcinoma is seen (0/3).\par \par \par PELVIC FLUID:\par - POSITIVE FOR MALIGNANT CELLS.\par - Adenocarcinoma.\par \par \par PROCEDURE: Total hysterectomy and bilateral salpingo-\par oophorectomy, omentum, lymph nodes\par HYSTERECTOMY TYPE: Laparoscopic\par SPECIMEN INTEGRITY OF RIGHT OVARY: Capsule intact\par SPECIMEN INTEGRITY OF LEFT OVARY: Capsule intact\par SPECIMEN INTEGRITY OF RIGHT FALLOPIAN TUBE: Serosa intact\par SPECIMEN INTEGRITY OF LEFT FALLOPIAN TUBE: Serosa intact\par TUMOR SITE: Left ovary\par OVARIAN SURFACE INVOLVEMENT: Present\par FALLOPIAN TUBE SURFACE INVOLVEMENT: Absent\par TUMOR SIZE: Greatest dimension: 3.5 cm (solid /papillary\par component)\par Additional dimensions: 2.6 x 1.4 cm\par HISTOLOGIC TYPE: Serous carcinoma\par HISTOLOGIC GRADE: WHO GRADING SYSTEM: G3: Poorly differentiated\par TWO TIER GRADING SYSTEM: High grade\par IMPLANTS: n/a\par OTHER TISSUE/ORGAN INVOLVEMENT: Not identified\par LARGEST EXTRAPELVIC PERITONEAL FOCUS: n/a\par PERITONEAL / ASCITIC FLUID: Malignant\par TREATMENT EFFECT: No known presurgical therapy\par REGIONAL LYMPH NODES: All lymph nodes negative for tumor cells\par Number of lymph nodes examined: 3\par PATHOLOGIC STAGE CLASSIFICATION\par PRIMARY TUMOR: pT1c3: Malignant cells in ascites or\par peritoneal washings and ovarian surface involvement\par REGIONAL LYMPH NODES: pN0\par FIGO STAGE: IC3: Malignant cells present in the ascites or\par peritoneal washings\par ADDITIONAL PATHOLOGIC FINDINGS: ovarian fibroma/thecoma, uterine\par leiomyoma\par \par \par \par \par  [de-identified] : 1/14/2021\par Pt is here to f/u for ovarian cancer, S/P Carbo/Taxol cycle 1 and Zarxio x 5 days.\par Pt is doing well today, denies N/V/D, no chest pain, no abdominal pain, no urinary symptoms. \par Pt reports intermittent mild spotting. Pt had 1 episode of nausea after the chemo and resolved after taking Loperamide, + numbness/tingling of bilateral feet on 1/1/21 with relief from pain meds, + chest discomfort on D3 of Zarxio, went to cardio Dr Teran and EKG was normal. Pt had decreased appetite  during the chemo but had improved this week. \par \par 2/8/21\par pt is here for follow up.\par S/p 2 cycles of chemo.\par She had developed mild neuropathy and was started on Gabapentin.\par Now on Gabapentin, she has improvement. I discussed possible dose reduction however pt does not want to reduce the dose.\par No abd pain, N, V, D\par \par 3/8/21 \par Pt presented today for routine f/u and treatment . She reports that her neuropathy has improved with gabapentin but sfter treatment she gets worsening of symptoms for 5 days . She is not sure whether its arthritis or neuropathy . \par Denies any abdominal pain nausea or vomiting . \par She is s/p 3 cycles , other then neuropathy , tolerating treatment well. \par \par 3/29/21 \par Pt presented today for f/u and to receive treatment . She reports feeling fine . Her neuropathy is not getting worse but she does c/o some tingling and numbness in fingers . \par No c/o nausea , vomiting or diarrhea . Tolerating treatment well.\par She was encouraged to receive covid vaccine . \par \par 4/19/21\par Pt presented for f/u and treatment today . She is s/p 5 cycles of carbo/ taxol for Stage 1C3 serous ovarian cancer . Tolerating treatment well except tingling and numbness , which she reports comes and goes . \par No c/o nausea ., vomiting or diarrhea . \par She will receive covid vaccine after finishing treatment . Requires GCSF support with chemo . \par \par 6/24/2021\par Pt is here to follow up for ovarian cancer\par She is s/p 6 cycles of carbo/taxol\par She feels well today, appetite is good\par She denies abdominal pain, nausea, vomiting or diarrhea\par She still have mild neuropathy on her fingertips and feet\par She received COVID vaccine x 2 doses\par \par 12/20/21\par Pt is here for follow up. Her neuropathy has improved. She has been taking alpha lipoic acid.\par No abd pain, N, v, d, vag bleeding\par \par 4/25/22\par Pt is here for follow up.\par Pt denies abdominal pain or bloating, denies vaginal bleeding or discharge.UTD with mammo, GYN and GI\par

## 2022-04-25 NOTE — REVIEW OF SYSTEMS
[Negative] : Heme/Lymph [Muscle Pain] : no muscle pain [de-identified] : + mild neuropathy on fingers and feet, improving

## 2022-04-26 DIAGNOSIS — C56.2 MALIGNANT NEOPLASM OF LEFT OVARY: ICD-10-CM

## 2022-04-26 LAB — CANCER AG125 SERPL-ACNC: 9 U/ML

## 2022-05-13 ENCOUNTER — APPOINTMENT (OUTPATIENT)
Dept: GYNECOLOGIC ONCOLOGY | Facility: CLINIC | Age: 72
End: 2022-05-13
Payer: MEDICARE

## 2022-05-13 VITALS
SYSTOLIC BLOOD PRESSURE: 128 MMHG | TEMPERATURE: 97.9 F | BODY MASS INDEX: 35.12 KG/M2 | DIASTOLIC BLOOD PRESSURE: 80 MMHG | HEIGHT: 61 IN | WEIGHT: 186 LBS

## 2022-05-13 DIAGNOSIS — Z87.09 PERSONAL HISTORY OF OTHER DISEASES OF THE RESPIRATORY SYSTEM: ICD-10-CM

## 2022-05-13 PROCEDURE — 99213 OFFICE O/P EST LOW 20 MIN: CPT

## 2022-05-13 NOTE — HISTORY OF PRESENT ILLNESS
[FreeTextEntry1] : 72 year old  patient of Dr. Ham,  ( x2).  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  Pathology reported FIGO 1 C3 (positive cytology).  She was given 6 cycles of chemotherapy by Dr. Aviles  She continues on Eloquis 5mg qd for A-Fib.  Treatment was completed in 2021.   is negative (8).  She presents for follow-up.

## 2022-05-13 NOTE — ASSESSMENT
[FreeTextEntry1] : 72 year old  patient of Dr. 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  Pathology reported FIGo 1 C3 (positive cytology).  She was given 6 cycles of chemotherapy by Dr. Aviles  She continues on Eloquis 5mg qd for A-Fib..  Treatment was completed in 2021.   is negative (8).  She presents for follow-up.  \par The patient appears to be doing well with no clinical evidence of disease.\par

## 2022-06-02 ENCOUNTER — APPOINTMENT (OUTPATIENT)
Dept: CARDIOLOGY | Facility: CLINIC | Age: 72
End: 2022-06-02
Payer: MEDICARE

## 2022-06-02 PROCEDURE — 93000 ELECTROCARDIOGRAM COMPLETE: CPT

## 2022-06-02 PROCEDURE — 99214 OFFICE O/P EST MOD 30 MIN: CPT

## 2022-06-09 ENCOUNTER — APPOINTMENT (OUTPATIENT)
Dept: CARDIOLOGY | Facility: CLINIC | Age: 72
End: 2022-06-09

## 2022-06-09 VITALS
BODY MASS INDEX: 34.74 KG/M2 | TEMPERATURE: 97.8 F | DIASTOLIC BLOOD PRESSURE: 70 MMHG | HEART RATE: 76 BPM | WEIGHT: 184 LBS | HEIGHT: 61 IN | SYSTOLIC BLOOD PRESSURE: 127 MMHG

## 2022-06-09 PROCEDURE — 93000 ELECTROCARDIOGRAM COMPLETE: CPT

## 2022-06-09 PROCEDURE — 99214 OFFICE O/P EST MOD 30 MIN: CPT | Mod: 25

## 2022-06-09 RX ORDER — LEVOTHYROXINE SODIUM 100 UG/1
100 TABLET ORAL DAILY
Refills: 0 | Status: COMPLETED | COMMUNITY
End: 2022-06-09

## 2022-09-04 NOTE — ASSESSMENT
[FreeTextEntry1] : 72 years old female with pmh of HTN, CAD, S/P PTCA with stent 2002, DM, Symptomatic   PAF dx in 10/2018 \par on Sotalol 80 mg BID for rhythm control \par \par \par AF\par 1- Continue  Eliquis 5 mg BID ( CHADS- VASc - 4, CAD, DM, FEMALE, HTN ); no bleeding\par 2. Continue Sotalol 80 mg BID \par 3 labs reviewed creatine normal \par \par \par HTN\par - cont losartan 100 mg qd\par

## 2022-09-04 NOTE — HISTORY OF PRESENT ILLNESS
[FreeTextEntry1] : 71 years old female with PAF dx in 10/2018 , on Sotalol 80 mg BID and Eliqus 10/31/2018 she returns for a follow up . \par \par She denies CP/SOB.  Reports rare palpitations , brief in duration lasting seconds .  \par No dizziness, or syncope .Good activity tolerance \par Denies PEREZ  Pt finished chemo for ovarian CA\par \par Patient is doing very well on sotalol.\par \par EKG SR 76 bpm Qtc 430\par \par ECG ( 3/21/2019) - NSR at 81 bpm, QTc 432 ms , non specific TWI \par ECG ( 5/14/2018)- NSR at 66 bpm, normal SC, QRS, QTc 444 ms

## 2022-09-08 ENCOUNTER — APPOINTMENT (OUTPATIENT)
Dept: CARDIOLOGY | Facility: CLINIC | Age: 72
End: 2022-09-08

## 2022-09-08 PROCEDURE — 99214 OFFICE O/P EST MOD 30 MIN: CPT | Mod: 25

## 2022-09-08 PROCEDURE — 93000 ELECTROCARDIOGRAM COMPLETE: CPT

## 2022-09-12 ENCOUNTER — APPOINTMENT (OUTPATIENT)
Dept: HEMATOLOGY ONCOLOGY | Facility: CLINIC | Age: 72
End: 2022-09-12

## 2022-09-12 ENCOUNTER — LABORATORY RESULT (OUTPATIENT)
Age: 72
End: 2022-09-12

## 2022-09-12 VITALS
SYSTOLIC BLOOD PRESSURE: 146 MMHG | BODY MASS INDEX: 34.55 KG/M2 | HEIGHT: 61 IN | TEMPERATURE: 97.6 F | HEART RATE: 79 BPM | WEIGHT: 183 LBS | DIASTOLIC BLOOD PRESSURE: 69 MMHG

## 2022-09-12 PROCEDURE — 88189 FLOWCYTOMETRY/READ 16 & >: CPT

## 2022-09-12 PROCEDURE — 99214 OFFICE O/P EST MOD 30 MIN: CPT

## 2022-09-13 ENCOUNTER — RX RENEWAL (OUTPATIENT)
Age: 72
End: 2022-09-13

## 2022-09-13 LAB
DIRECT COOMBS: NORMAL
VIT B12 SERPL-MCNC: 423 PG/ML

## 2022-09-19 LAB
ALBUMIN MFR SERPL ELPH: 56.5 %
ALBUMIN SERPL-MCNC: 3.8 G/DL
ALBUMIN/GLOB SERPL: 1.3 RATIO
ALBUPE: 51.3 %
ALPHA1 GLOB MFR SERPL ELPH: 3.7 %
ALPHA1 GLOB SERPL ELPH-MCNC: 0.3 G/DL
ALPHA1UPE: 19 %
ALPHA2 GLOB MFR SERPL ELPH: 10.4 %
ALPHA2 GLOB SERPL ELPH-MCNC: 0.7 G/DL
ALPHA2UPE: 10.7 %
B-GLOBULIN MFR SERPL ELPH: 13 %
B-GLOBULIN SERPL ELPH-MCNC: 0.9 G/DL
BETAUPE: 10.4 %
CREAT 24H UR-MCNC: NORMAL G/24 H
CREATININE UR (MAYO): 72 MG/DL
GAMMA GLOB FLD ELPH-MCNC: 1.1 G/DL
GAMMA GLOB MFR SERPL ELPH: 16.4 %
GAMMAUPE: 8.6 %
IGA 24H UR QL IFE: NORMAL
INTERPRETATION SERPL IEP-IMP: NORMAL
KAPPA LC 24H UR QL: NORMAL
PROT PATTERN 24H UR ELPH-IMP: NORMAL
PROT SERPL-MCNC: 6.8 G/DL
PROT SERPL-MCNC: 6.8 G/DL
PROT UR-MCNC: 68 MG/DL
PROT UR-MCNC: 68 MG/DL
SPECIMEN VOL 24H UR: NORMAL ML

## 2022-09-21 LAB
ALBUMIN SERPL ELPH-MCNC: 4.3 G/DL
ALP BLD-CCNC: 61 U/L
ALT SERPL-CCNC: 15 U/L
ANION GAP SERPL CALC-SCNC: 9 MMOL/L
AST SERPL-CCNC: 21 U/L
BASOPHILS # BLD AUTO: 0.02 K/UL
BASOPHILS NFR BLD AUTO: 0.6 %
BILIRUB SERPL-MCNC: 2.3 MG/DL
BUN SERPL-MCNC: 13 MG/DL
CALCIUM SERPL-MCNC: 9.6 MG/DL
CANCER AG125 SERPL-ACNC: 11 U/ML
CHLORIDE SERPL-SCNC: 102 MMOL/L
CO2 SERPL-SCNC: 28 MMOL/L
CREAT SERPL-MCNC: 0.6 MG/DL
DEPRECATED KAPPA LC FREE/LAMBDA SER: 1.23 RATIO
EGFR: 95 ML/MIN/1.73M2
EOSINOPHIL # BLD AUTO: 0.09 K/UL
EOSINOPHIL NFR BLD AUTO: 2.6 %
FERRITIN SERPL-MCNC: 39 NG/ML
FOLATE SERPL-MCNC: >20 NG/ML
GLUCOSE SERPL-MCNC: 143 MG/DL
HAPTOGLOB SERPL-MCNC: 124 MG/DL
HCT VFR BLD CALC: 33 %
HGB BLD-MCNC: 11.7 G/DL
IMM GRANULOCYTES NFR BLD AUTO: 0.3 %
IRON SATN MFR SERPL: 24 %
IRON SERPL-MCNC: 75 UG/DL
KAPPA LC CSF-MCNC: 2.08 MG/DL
KAPPA LC SERPL-MCNC: 2.56 MG/DL
LDH SERPL-CCNC: 189
LYMPHOCYTES # BLD AUTO: 1.42 K/UL
LYMPHOCYTES NFR BLD AUTO: 41.4 %
M PROTEIN SPEC IFE-MCNC: NORMAL
MAN DIFF?: NORMAL
MCHC RBC-ENTMCNC: 32.7 PG
MCHC RBC-ENTMCNC: 35.5 G/DL
MCV RBC AUTO: 92.2 FL
MONOCYTES # BLD AUTO: 0.3 K/UL
MONOCYTES NFR BLD AUTO: 8.7 %
NEUTROPHILS # BLD AUTO: 1.59 K/UL
NEUTROPHILS NFR BLD AUTO: 46.4 %
PLATELET # BLD AUTO: 223 K/UL
POTASSIUM SERPL-SCNC: 4.1 MMOL/L
PROT SERPL-MCNC: 6.8 G/DL
RBC # BLD: 3.58 M/UL
RBC # FLD: 13.7 %
SODIUM SERPL-SCNC: 139 MMOL/L
TIBC SERPL-MCNC: 313 UG/DL
UIBC SERPL-MCNC: 238 UG/DL
WBC # FLD AUTO: 3.43 K/UL

## 2022-09-21 NOTE — REVIEW OF SYSTEMS
[Negative] : Heme/Lymph [Muscle Pain] : no muscle pain [de-identified] : + mild neuropathy on fingers and feet, improving

## 2022-09-21 NOTE — HISTORY OF PRESENT ILLNESS
[de-identified] : 70 year old female with PMH of CAD, Afib on eliquis, DM, HTN, DLD is here to discuss adjuvant therapy for new diagnosis of ovarian cancer. \par \par  Patient had episode of chest pain in August for which a CTA was performed. An incidental finding revealed a left adnexal mass. She then saw her OBGYN. A pelvic ultrasound was done, as well as a dedicated CT of Abdomen and Pelvis that showed a 8.3 x 8.8 x 7.4 cm left adnexal mass with solid components. 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 - was 15 prior to surgery. \par \par She has recovered well from surgery. She does not have any c/o abdominal pain, weight loss, GOPI. No c/o chest pain/ SOB. \par She is very active and lives with her family at home. \par She takes eliquis and ASA 81 for CAD and Afib \par She was getting B12 inj with PMD for low B12 \par \par H/o Colon cancer in 3 maternal aunts \par Last Mammogram and Colonoscopy- 2-3 months normal \par Radiology- 08/20- CTA- No CTA evidence of aortic dissection or intramural hematoma. 5.3 cm left adnexal cystic lesion. Recommend ultrasound prior to gadolinium enhanced MRI of the pelvis for further evaluation on an outpatient basis.\par \par 11/20- MRI pelvis- Complex cystic lesion lt adnexa with hemorrhagic debris. No pelvic or inguinal adenopathy. \par \par  \par Pathology: Final Diagnosis\par 1. Left tube and ovary (left pelvic mass):\par - Left ovary, high grade papillary serous carcinoma.\par - (The ovarian surface is involved by tumor).\par - Ovarian fibroma / thecoma\par - Fallopian tube, paratubal cyst\par \par 2. Cervix, uterus, right tube and ovary:\par - Uterine cervix, reactive changes\par - Endometrium, mainly atrophic\par - Uterus, leiomyoma\par - Right ovary, no involvement by carcinoma is seen.\par - Fallopian tube, paratubal cyst\par \par 3. Portion of omentum:\par - Omental adipose tissue, no involvement by carcinoma is seen.\par \par 4. Left pelvic nodes:\par - Lymph nodes, no metastatic carcinoma is seen (0/3).\par \par \par PELVIC FLUID:\par - POSITIVE FOR MALIGNANT CELLS.\par - Adenocarcinoma.\par \par \par PROCEDURE: Total hysterectomy and bilateral salpingo-\par oophorectomy, omentum, lymph nodes\par HYSTERECTOMY TYPE: Laparoscopic\par SPECIMEN INTEGRITY OF RIGHT OVARY: Capsule intact\par SPECIMEN INTEGRITY OF LEFT OVARY: Capsule intact\par SPECIMEN INTEGRITY OF RIGHT FALLOPIAN TUBE: Serosa intact\par SPECIMEN INTEGRITY OF LEFT FALLOPIAN TUBE: Serosa intact\par TUMOR SITE: Left ovary\par OVARIAN SURFACE INVOLVEMENT: Present\par FALLOPIAN TUBE SURFACE INVOLVEMENT: Absent\par TUMOR SIZE: Greatest dimension: 3.5 cm (solid /papillary\par component)\par Additional dimensions: 2.6 x 1.4 cm\par HISTOLOGIC TYPE: Serous carcinoma\par HISTOLOGIC GRADE: WHO GRADING SYSTEM: G3: Poorly differentiated\par TWO TIER GRADING SYSTEM: High grade\par IMPLANTS: n/a\par OTHER TISSUE/ORGAN INVOLVEMENT: Not identified\par LARGEST EXTRAPELVIC PERITONEAL FOCUS: n/a\par PERITONEAL / ASCITIC FLUID: Malignant\par TREATMENT EFFECT: No known presurgical therapy\par REGIONAL LYMPH NODES: All lymph nodes negative for tumor cells\par Number of lymph nodes examined: 3\par PATHOLOGIC STAGE CLASSIFICATION\par PRIMARY TUMOR: pT1c3: Malignant cells in ascites or\par peritoneal washings and ovarian surface involvement\par REGIONAL LYMPH NODES: pN0\par FIGO STAGE: IC3: Malignant cells present in the ascites or\par peritoneal washings\par ADDITIONAL PATHOLOGIC FINDINGS: ovarian fibroma/thecoma, uterine\par leiomyoma\par \par \par \par \par  [de-identified] : 1/14/2021\par Pt is here to f/u for ovarian cancer, S/P Carbo/Taxol cycle 1 and Zarxio x 5 days.\par Pt is doing well today, denies N/V/D, no chest pain, no abdominal pain, no urinary symptoms. \par Pt reports intermittent mild spotting. Pt had 1 episode of nausea after the chemo and resolved after taking Loperamide, + numbness/tingling of bilateral feet on 1/1/21 with relief from pain meds, + chest discomfort on D3 of Zarxio, went to cardio Dr Teran and EKG was normal. Pt had decreased appetite  during the chemo but had improved this week. \par \par 2/8/21\par pt is here for follow up.\par S/p 2 cycles of chemo.\par She had developed mild neuropathy and was started on Gabapentin.\par Now on Gabapentin, she has improvement. I discussed possible dose reduction however pt does not want to reduce the dose.\par No abd pain, N, V, D\par \par 3/8/21 \par Pt presented today for routine f/u and treatment . She reports that her neuropathy has improved with gabapentin but sfter treatment she gets worsening of symptoms for 5 days . She is not sure whether its arthritis or neuropathy . \par Denies any abdominal pain nausea or vomiting . \par She is s/p 3 cycles , other then neuropathy , tolerating treatment well. \par \par 3/29/21 \par Pt presented today for f/u and to receive treatment . She reports feeling fine . Her neuropathy is not getting worse but she does c/o some tingling and numbness in fingers . \par No c/o nausea , vomiting or diarrhea . Tolerating treatment well.\par She was encouraged to receive covid vaccine . \par \par 4/19/21\par Pt presented for f/u and treatment today . She is s/p 5 cycles of carbo/ taxol for Stage 1C3 serous ovarian cancer . Tolerating treatment well except tingling and numbness , which she reports comes and goes . \par No c/o nausea ., vomiting or diarrhea . \par She will receive covid vaccine after finishing treatment . Requires GCSF support with chemo . \par \par 6/24/2021\par Pt is here to follow up for ovarian cancer\par She is s/p 6 cycles of carbo/taxol\par She feels well today, appetite is good\par She denies abdominal pain, nausea, vomiting or diarrhea\par She still have mild neuropathy on her fingertips and feet\par She received COVID vaccine x 2 doses\par \par 12/20/21\par Pt is here for follow up. Her neuropathy has improved. She has been taking alpha lipoic acid.\par No abd pain, N, v, d, vag bleeding\par \par 4/25/22\par Pt is here for follow up.\par Pt denies abdominal pain or bloating, denies vaginal bleeding or discharge.UTD with mammo, GYN and GI\par \par 9/12/22\par \par Pt is here for follow up.\par Feels somewhat more fatigued.\par had labs done by Dr Siddharth montiel and found to be anemic with Hgb of 10.6, WBC 3.3 MCV 96, plts 197K\par No bleeding reported

## 2022-09-21 NOTE — PHYSICAL EXAM
[Fully active, able to carry on all pre-disease performance without restriction] : Status 0 - Fully active, able to carry on all pre-disease performance without restriction [Obese] : obese [Normal] : affect appropriate [de-identified] : Laparoscopy scars are healing well . Non tender

## 2022-09-21 NOTE — ASSESSMENT
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- Stage 1C3,  Capsule intact with +pelvic wash for malignant cells-  pT1c, pN0\par - S/P Carbo/Taxol x 6 cycles in 4/21\par - VINICIUS at this time\par - Signs and symptoms of disease recurrence discussed with the patient\par -\par \par # NCNC anemia: will send w/u although Hgb is already better compared to previous lab in 8/5/22\par \par \par - Follow up with gyne, PCP and GI as scheduled\par \par All her concerns were discussed during the visit\par RTC in1 months\par \par \par \par \par \par \par \par

## 2022-10-04 ENCOUNTER — RX RENEWAL (OUTPATIENT)
Age: 72
End: 2022-10-04

## 2022-10-06 ENCOUNTER — APPOINTMENT (OUTPATIENT)
Dept: HEMATOLOGY ONCOLOGY | Facility: CLINIC | Age: 72
End: 2022-10-06

## 2022-10-06 ENCOUNTER — OUTPATIENT (OUTPATIENT)
Dept: OUTPATIENT SERVICES | Facility: HOSPITAL | Age: 72
LOS: 1 days | Discharge: HOME | End: 2022-10-06

## 2022-10-06 VITALS
TEMPERATURE: 97.6 F | SYSTOLIC BLOOD PRESSURE: 129 MMHG | DIASTOLIC BLOOD PRESSURE: 77 MMHG | HEART RATE: 84 BPM | BODY MASS INDEX: 33.99 KG/M2 | WEIGHT: 180 LBS | HEIGHT: 61 IN

## 2022-10-06 DIAGNOSIS — Z95.5 PRESENCE OF CORONARY ANGIOPLASTY IMPLANT AND GRAFT: Chronic | ICD-10-CM

## 2022-10-06 DIAGNOSIS — D64.9 ANEMIA, UNSPECIFIED: ICD-10-CM

## 2022-10-06 DIAGNOSIS — C56.2 MALIGNANT NEOPLASM OF LEFT OVARY: ICD-10-CM

## 2022-10-06 DIAGNOSIS — Z90.49 ACQUIRED ABSENCE OF OTHER SPECIFIED PARTS OF DIGESTIVE TRACT: Chronic | ICD-10-CM

## 2022-10-06 PROCEDURE — 99213 OFFICE O/P EST LOW 20 MIN: CPT

## 2022-10-06 NOTE — REVIEW OF SYSTEMS
[Negative] : Musculoskeletal [Muscle Pain] : no muscle pain [de-identified] : + mild neuropathy on fingers and feet, improving

## 2022-10-06 NOTE — HISTORY OF PRESENT ILLNESS
[de-identified] : 70 year old female with PMH of CAD, Afib on eliquis, DM, HTN, DLD is here to discuss adjuvant therapy for new diagnosis of ovarian cancer. \par \par  Patient had episode of chest pain in August for which a CTA was performed. An incidental finding revealed a left adnexal mass. She then saw her OBGYN. A pelvic ultrasound was done, as well as a dedicated CT of Abdomen and Pelvis that showed a 8.3 x 8.8 x 7.4 cm left adnexal mass with solid components. 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 - was 15 prior to surgery. \par \par She has recovered well from surgery. She does not have any c/o abdominal pain, weight loss, GOPI. No c/o chest pain/ SOB. \par She is very active and lives with her family at home. \par She takes eliquis and ASA 81 for CAD and Afib \par She was getting B12 inj with PMD for low B12 \par \par H/o Colon cancer in 3 maternal aunts \par Last Mammogram and Colonoscopy- 2-3 months normal \par Radiology- 08/20- CTA- No CTA evidence of aortic dissection or intramural hematoma. 5.3 cm left adnexal cystic lesion. Recommend ultrasound prior to gadolinium enhanced MRI of the pelvis for further evaluation on an outpatient basis.\par \par 11/20- MRI pelvis- Complex cystic lesion lt adnexa with hemorrhagic debris. No pelvic or inguinal adenopathy. \par \par  \par Pathology: Final Diagnosis\par 1. Left tube and ovary (left pelvic mass):\par - Left ovary, high grade papillary serous carcinoma.\par - (The ovarian surface is involved by tumor).\par - Ovarian fibroma / thecoma\par - Fallopian tube, paratubal cyst\par \par 2. Cervix, uterus, right tube and ovary:\par - Uterine cervix, reactive changes\par - Endometrium, mainly atrophic\par - Uterus, leiomyoma\par - Right ovary, no involvement by carcinoma is seen.\par - Fallopian tube, paratubal cyst\par \par 3. Portion of omentum:\par - Omental adipose tissue, no involvement by carcinoma is seen.\par \par 4. Left pelvic nodes:\par - Lymph nodes, no metastatic carcinoma is seen (0/3).\par \par \par PELVIC FLUID:\par - POSITIVE FOR MALIGNANT CELLS.\par - Adenocarcinoma.\par \par \par PROCEDURE: Total hysterectomy and bilateral salpingo-\par oophorectomy, omentum, lymph nodes\par HYSTERECTOMY TYPE: Laparoscopic\par SPECIMEN INTEGRITY OF RIGHT OVARY: Capsule intact\par SPECIMEN INTEGRITY OF LEFT OVARY: Capsule intact\par SPECIMEN INTEGRITY OF RIGHT FALLOPIAN TUBE: Serosa intact\par SPECIMEN INTEGRITY OF LEFT FALLOPIAN TUBE: Serosa intact\par TUMOR SITE: Left ovary\par OVARIAN SURFACE INVOLVEMENT: Present\par FALLOPIAN TUBE SURFACE INVOLVEMENT: Absent\par TUMOR SIZE: Greatest dimension: 3.5 cm (solid /papillary\par component)\par Additional dimensions: 2.6 x 1.4 cm\par HISTOLOGIC TYPE: Serous carcinoma\par HISTOLOGIC GRADE: WHO GRADING SYSTEM: G3: Poorly differentiated\par TWO TIER GRADING SYSTEM: High grade\par IMPLANTS: n/a\par OTHER TISSUE/ORGAN INVOLVEMENT: Not identified\par LARGEST EXTRAPELVIC PERITONEAL FOCUS: n/a\par PERITONEAL / ASCITIC FLUID: Malignant\par TREATMENT EFFECT: No known presurgical therapy\par REGIONAL LYMPH NODES: All lymph nodes negative for tumor cells\par Number of lymph nodes examined: 3\par PATHOLOGIC STAGE CLASSIFICATION\par PRIMARY TUMOR: pT1c3: Malignant cells in ascites or\par peritoneal washings and ovarian surface involvement\par REGIONAL LYMPH NODES: pN0\par FIGO STAGE: IC3: Malignant cells present in the ascites or\par peritoneal washings\par ADDITIONAL PATHOLOGIC FINDINGS: ovarian fibroma/thecoma, uterine\par leiomyoma\par \par \par \par \par  [de-identified] : 1/14/2021\par Pt is here to f/u for ovarian cancer, S/P Carbo/Taxol cycle 1 and Zarxio x 5 days.\par Pt is doing well today, denies N/V/D, no chest pain, no abdominal pain, no urinary symptoms. \par Pt reports intermittent mild spotting. Pt had 1 episode of nausea after the chemo and resolved after taking Loperamide, + numbness/tingling of bilateral feet on 1/1/21 with relief from pain meds, + chest discomfort on D3 of Zarxio, went to cardio Dr Teran and EKG was normal. Pt had decreased appetite  during the chemo but had improved this week. \par \par 2/8/21\par pt is here for follow up.\par S/p 2 cycles of chemo.\par She had developed mild neuropathy and was started on Gabapentin.\par Now on Gabapentin, she has improvement. I discussed possible dose reduction however pt does not want to reduce the dose.\par No abd pain, N, V, D\par \par 3/8/21 \par Pt presented today for routine f/u and treatment . She reports that her neuropathy has improved with gabapentin but sfter treatment she gets worsening of symptoms for 5 days . She is not sure whether its arthritis or neuropathy . \par Denies any abdominal pain nausea or vomiting . \par She is s/p 3 cycles , other then neuropathy , tolerating treatment well. \par \par 3/29/21 \par Pt presented today for f/u and to receive treatment . She reports feeling fine . Her neuropathy is not getting worse but she does c/o some tingling and numbness in fingers . \par No c/o nausea , vomiting or diarrhea . Tolerating treatment well.\par She was encouraged to receive covid vaccine . \par \par 4/19/21\par Pt presented for f/u and treatment today . She is s/p 5 cycles of carbo/ taxol for Stage 1C3 serous ovarian cancer . Tolerating treatment well except tingling and numbness , which she reports comes and goes . \par No c/o nausea ., vomiting or diarrhea . \par She will receive covid vaccine after finishing treatment . Requires GCSF support with chemo . \par \par 6/24/2021\par Pt is here to follow up for ovarian cancer\par She is s/p 6 cycles of carbo/taxol\par She feels well today, appetite is good\par She denies abdominal pain, nausea, vomiting or diarrhea\par She still have mild neuropathy on her fingertips and feet\par She received COVID vaccine x 2 doses\par \par 12/20/21\par Pt is here for follow up. Her neuropathy has improved. She has been taking alpha lipoic acid.\par No abd pain, N, v, d, vag bleeding\par \par 4/25/22\par Pt is here for follow up.\par Pt denies abdominal pain or bloating, denies vaginal bleeding or discharge.UTD with mammo, GYN and GI\par \par 9/12/22\par Pt is here for follow up.\par Feels somewhat more fatigued.\par had labs done by Dr Siddharth montiel and found to be anemic with Hgb of 10.6, WBC 3.3 MCV 96, plts 197K\par No bleeding reported\par \par 10/6/22\par Patient here for follow up visit to discuss recent anemia work up results.\par She is feeling well, has no new complaints.

## 2022-10-06 NOTE — ASSESSMENT
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- Stage 1C3,  Capsule intact with +pelvic wash for malignant cells-  pT1c, pN0\par - S/P Carbo/Taxol x 6 cycles in 4/21\par - VINICIUS at this time\par \par \par # NCNC anemia: work up showing only proteinuria, which may be secondary to long history of DM.\par                            recommended to see nephrologist for follow up.\par                            ferritin 39, recommended ferrous sulfate three times per week.\par \par - Follow up with gyn, PCP and GI as scheduled\par \par All her concerns were discussed during the visit\par .RTC im January (4 months from last visit)\par \par Case discussed and patient seen with Dr. Aviles.\par \par \par \par \par \par \par \par \par

## 2022-10-25 ENCOUNTER — APPOINTMENT (OUTPATIENT)
Dept: HEMATOLOGY ONCOLOGY | Facility: CLINIC | Age: 72
End: 2022-10-25

## 2022-11-02 NOTE — ED ADULT TRIAGE NOTE - NSWEIGHTCALCTOOLDRUG_GEN_A_CORE
If you are a smoker, it is important for your health to stop smoking. Please be aware that second hand smoke is also harmful.  used

## 2022-11-07 ENCOUNTER — RX RENEWAL (OUTPATIENT)
Age: 72
End: 2022-11-07

## 2022-12-08 ENCOUNTER — APPOINTMENT (OUTPATIENT)
Dept: CARDIOLOGY | Facility: CLINIC | Age: 72
End: 2022-12-08

## 2022-12-08 VITALS
HEART RATE: 98 BPM | WEIGHT: 181 LBS | BODY MASS INDEX: 34.17 KG/M2 | DIASTOLIC BLOOD PRESSURE: 70 MMHG | RESPIRATION RATE: 16 BRPM | HEIGHT: 61 IN | SYSTOLIC BLOOD PRESSURE: 120 MMHG

## 2022-12-08 PROCEDURE — 93000 ELECTROCARDIOGRAM COMPLETE: CPT

## 2022-12-08 PROCEDURE — 99214 OFFICE O/P EST MOD 30 MIN: CPT | Mod: 25

## 2022-12-08 RX ORDER — LEVOTHYROXINE SODIUM 0.09 MG/1
88 TABLET ORAL DAILY
Refills: 0 | Status: COMPLETED | COMMUNITY
End: 2022-12-08

## 2022-12-11 NOTE — HISTORY OF PRESENT ILLNESS
[FreeTextEntry1] : 72 years old female with PAF dx in 10/2018 , on Sotalol 80 mg BID and Eliqus 10/31/2018 she returns for a follow up . \par \par She denies CP/SOB.  Reports rare palpitations , brief in duration lasting seconds .  \par No dizziness, or syncope .Good activity tolerance \par Denies PEREZ  Pt finished chemo for ovarian CA\par \par Patient is doing great, no signs of recurrence of Atrial Fibrillation with Sotalol. \par \par EKG (12/8/2022) sinus rhythm at 82 BPM.\par Labs from (9/12/2022) were normal, creatinine 0.6\par \par EKG SR 76 bpm Qtc 430\par \par ECG ( 3/21/2019) - NSR at 81 bpm, QTc 432 ms , non specific TWI \par ECG ( 5/14/2018)- NSR at 66 bpm, normal [place], QRS, QTc 444 ms

## 2022-12-11 NOTE — ASSESSMENT
[FreeTextEntry1] : 72 years old female with pmh of HTN, CAD, S/P PTCA with stent 2002, DM, Symptomatic   PAF dx in 10/2018 \par on Sotalol 80 mg BID for rhythm control \par \par \par AF\par -Continue  Eliquis 5 mg BID ( CHADS- VASc - 4, CAD, DM, FEMALE, HTN ); no bleeding\par - Continue Sotalol 80 mg BID \par \par \par \par HTN\par - cont losartan 100 mg qd\par

## 2022-12-11 NOTE — ADDENDUM
[FreeTextEntry1] : I, Belia Salazar assisted in documentation on 12/08/2022  acting as a scribe for Dr. Miles Forman.\par

## 2022-12-20 ENCOUNTER — APPOINTMENT (OUTPATIENT)
Dept: GYNECOLOGIC ONCOLOGY | Facility: CLINIC | Age: 72
End: 2022-12-20

## 2022-12-20 VITALS
BODY MASS INDEX: 33.99 KG/M2 | HEIGHT: 61 IN | DIASTOLIC BLOOD PRESSURE: 87 MMHG | WEIGHT: 180 LBS | SYSTOLIC BLOOD PRESSURE: 132 MMHG

## 2022-12-20 PROCEDURE — 99214 OFFICE O/P EST MOD 30 MIN: CPT

## 2022-12-20 NOTE — PHYSICAL EXAM
[Chaperone Present] : A chaperone was present in the examining room during all aspects of the physical examination Yes - the patient is able to be screened

## 2022-12-20 NOTE — ASSESSMENT
[FreeTextEntry1] : 72 year old patient of Dr. Ham,  ( x2). 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 Pathology reported FIGO 1 C3 (positive cytology). She was given 6 cycles of chemotherapy by Dr. Aviles She continues on Eloquis 5mg qd for A-Fib. Treatment was completed in 2021.  is negative (8). She presents for follow-up. \par The patient appears to be doing well with no clinical evidence of disease.\par

## 2022-12-20 NOTE — HISTORY OF PRESENT ILLNESS
[FreeTextEntry1] : 72 year old patient of Dr. Ham,  ( x2). 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 Pathology reported FIGO 1 C3 (positive cytology). She was given 6 cycles of chemotherapy by Dr. Aviles She continues on Eloquis 5mg qd for A-Fib. Treatment was completed in 2021.  is negative (8). She presents for follow-up. \par

## 2023-01-05 ENCOUNTER — APPOINTMENT (OUTPATIENT)
Dept: CARDIOLOGY | Facility: CLINIC | Age: 73
End: 2023-01-05

## 2023-01-12 ENCOUNTER — APPOINTMENT (OUTPATIENT)
Dept: HEMATOLOGY ONCOLOGY | Facility: CLINIC | Age: 73
End: 2023-01-12
Payer: MEDICARE

## 2023-01-12 ENCOUNTER — OUTPATIENT (OUTPATIENT)
Dept: OUTPATIENT SERVICES | Facility: HOSPITAL | Age: 73
LOS: 1 days | End: 2023-01-12

## 2023-01-12 ENCOUNTER — LABORATORY RESULT (OUTPATIENT)
Age: 73
End: 2023-01-12

## 2023-01-12 VITALS
HEART RATE: 80 BPM | WEIGHT: 185 LBS | BODY MASS INDEX: 34.93 KG/M2 | HEIGHT: 61 IN | SYSTOLIC BLOOD PRESSURE: 125 MMHG | DIASTOLIC BLOOD PRESSURE: 79 MMHG | TEMPERATURE: 98.3 F

## 2023-01-12 DIAGNOSIS — Z90.49 ACQUIRED ABSENCE OF OTHER SPECIFIED PARTS OF DIGESTIVE TRACT: Chronic | ICD-10-CM

## 2023-01-12 DIAGNOSIS — Z95.5 PRESENCE OF CORONARY ANGIOPLASTY IMPLANT AND GRAFT: Chronic | ICD-10-CM

## 2023-01-12 LAB
ALBUMIN SERPL ELPH-MCNC: 4.3 G/DL
ALP BLD-CCNC: 59 U/L
ALT SERPL-CCNC: 15 U/L
ANION GAP SERPL CALC-SCNC: 6 MMOL/L
AST SERPL-CCNC: 20 U/L
BILIRUB SERPL-MCNC: 1.6 MG/DL
BUN SERPL-MCNC: 18 MG/DL
CALCIUM SERPL-MCNC: 9.4 MG/DL
CHLORIDE SERPL-SCNC: 102 MMOL/L
CO2 SERPL-SCNC: 29 MMOL/L
CREAT SERPL-MCNC: 0.7 MG/DL
EGFR: 92 ML/MIN/1.73M2
GLUCOSE SERPL-MCNC: 211 MG/DL
HCT VFR BLD CALC: 35.3 %
HGB BLD-MCNC: 11.8 G/DL
IRON SATN MFR SERPL: 32 %
IRON SERPL-MCNC: 93 UG/DL
MCHC RBC-ENTMCNC: 31.1 PG
MCHC RBC-ENTMCNC: 33.4 G/DL
MCV RBC AUTO: 92.9 FL
PLATELET # BLD AUTO: 277 K/UL
PMV BLD: 10 FL
POTASSIUM SERPL-SCNC: 5 MMOL/L
PROT SERPL-MCNC: 6.9 G/DL
RBC # BLD: 3.8 M/UL
RBC # FLD: 13.1 %
SODIUM SERPL-SCNC: 137 MMOL/L
TIBC SERPL-MCNC: 291 UG/DL
UIBC SERPL-MCNC: 198 UG/DL
WBC # FLD AUTO: 3.83 K/UL

## 2023-01-12 PROCEDURE — 99213 OFFICE O/P EST LOW 20 MIN: CPT

## 2023-01-12 NOTE — HISTORY OF PRESENT ILLNESS
[de-identified] : 70 year old female with PMH of CAD, Afib on eliquis, DM, HTN, DLD is here to discuss adjuvant therapy for new diagnosis of ovarian cancer. \par \par  Patient had episode of chest pain in August for which a CTA was performed. An incidental finding revealed a left adnexal mass. She then saw her OBGYN. A pelvic ultrasound was done, as well as a dedicated CT of Abdomen and Pelvis that showed a 8.3 x 8.8 x 7.4 cm left adnexal mass with solid components. 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 - was 15 prior to surgery. \par \par She has recovered well from surgery. She does not have any c/o abdominal pain, weight loss, GOPI. No c/o chest pain/ SOB. \par She is very active and lives with her family at home. \par She takes eliquis and ASA 81 for CAD and Afib \par She was getting B12 inj with PMD for low B12 \par \par H/o Colon cancer in 3 maternal aunts \par Last Mammogram and Colonoscopy- 2-3 months normal \par Radiology- 08/20- CTA- No CTA evidence of aortic dissection or intramural hematoma. 5.3 cm left adnexal cystic lesion. Recommend ultrasound prior to gadolinium enhanced MRI of the pelvis for further evaluation on an outpatient basis.\par \par 11/20- MRI pelvis- Complex cystic lesion lt adnexa with hemorrhagic debris. No pelvic or inguinal adenopathy. \par \par  \par Pathology: Final Diagnosis\par 1. Left tube and ovary (left pelvic mass):\par - Left ovary, high grade papillary serous carcinoma.\par - (The ovarian surface is involved by tumor).\par - Ovarian fibroma / thecoma\par - Fallopian tube, paratubal cyst\par \par 2. Cervix, uterus, right tube and ovary:\par - Uterine cervix, reactive changes\par - Endometrium, mainly atrophic\par - Uterus, leiomyoma\par - Right ovary, no involvement by carcinoma is seen.\par - Fallopian tube, paratubal cyst\par \par 3. Portion of omentum:\par - Omental adipose tissue, no involvement by carcinoma is seen.\par \par 4. Left pelvic nodes:\par - Lymph nodes, no metastatic carcinoma is seen (0/3).\par \par \par PELVIC FLUID:\par - POSITIVE FOR MALIGNANT CELLS.\par - Adenocarcinoma.\par \par \par PROCEDURE: Total hysterectomy and bilateral salpingo-\par oophorectomy, omentum, lymph nodes\par HYSTERECTOMY TYPE: Laparoscopic\par SPECIMEN INTEGRITY OF RIGHT OVARY: Capsule intact\par SPECIMEN INTEGRITY OF LEFT OVARY: Capsule intact\par SPECIMEN INTEGRITY OF RIGHT FALLOPIAN TUBE: Serosa intact\par SPECIMEN INTEGRITY OF LEFT FALLOPIAN TUBE: Serosa intact\par TUMOR SITE: Left ovary\par OVARIAN SURFACE INVOLVEMENT: Present\par FALLOPIAN TUBE SURFACE INVOLVEMENT: Absent\par TUMOR SIZE: Greatest dimension: 3.5 cm (solid /papillary\par component)\par Additional dimensions: 2.6 x 1.4 cm\par HISTOLOGIC TYPE: Serous carcinoma\par HISTOLOGIC GRADE: WHO GRADING SYSTEM: G3: Poorly differentiated\par TWO TIER GRADING SYSTEM: High grade\par IMPLANTS: n/a\par OTHER TISSUE/ORGAN INVOLVEMENT: Not identified\par LARGEST EXTRAPELVIC PERITONEAL FOCUS: n/a\par PERITONEAL / ASCITIC FLUID: Malignant\par TREATMENT EFFECT: No known presurgical therapy\par REGIONAL LYMPH NODES: All lymph nodes negative for tumor cells\par Number of lymph nodes examined: 3\par PATHOLOGIC STAGE CLASSIFICATION\par PRIMARY TUMOR: pT1c3: Malignant cells in ascites or\par peritoneal washings and ovarian surface involvement\par REGIONAL LYMPH NODES: pN0\par FIGO STAGE: IC3: Malignant cells present in the ascites or\par peritoneal washings\par ADDITIONAL PATHOLOGIC FINDINGS: ovarian fibroma/thecoma, uterine\par leiomyoma\par \par \par \par \par  [de-identified] : 1/14/2021\par Pt is here to f/u for ovarian cancer, S/P Carbo/Taxol cycle 1 and Zarxio x 5 days.\par Pt is doing well today, denies N/V/D, no chest pain, no abdominal pain, no urinary symptoms. \par Pt reports intermittent mild spotting. Pt had 1 episode of nausea after the chemo and resolved after taking Loperamide, + numbness/tingling of bilateral feet on 1/1/21 with relief from pain meds, + chest discomfort on D3 of Zarxio, went to cardio Dr Teran and EKG was normal. Pt had decreased appetite  during the chemo but had improved this week. \par \par 2/8/21\par pt is here for follow up.\par S/p 2 cycles of chemo.\par She had developed mild neuropathy and was started on Gabapentin.\par Now on Gabapentin, she has improvement. I discussed possible dose reduction however pt does not want to reduce the dose.\par No abd pain, N, V, D\par \par 3/8/21 \par Pt presented today for routine f/u and treatment . She reports that her neuropathy has improved with gabapentin but sfter treatment she gets worsening of symptoms for 5 days . She is not sure whether its arthritis or neuropathy . \par Denies any abdominal pain nausea or vomiting . \par She is s/p 3 cycles , other then neuropathy , tolerating treatment well. \par \par 3/29/21 \par Pt presented today for f/u and to receive treatment . She reports feeling fine . Her neuropathy is not getting worse but she does c/o some tingling and numbness in fingers . \par No c/o nausea , vomiting or diarrhea . Tolerating treatment well.\par She was encouraged to receive covid vaccine . \par \par 4/19/21\par Pt presented for f/u and treatment today . She is s/p 5 cycles of carbo/ taxol for Stage 1C3 serous ovarian cancer . Tolerating treatment well except tingling and numbness , which she reports comes and goes . \par No c/o nausea ., vomiting or diarrhea . \par She will receive covid vaccine after finishing treatment . Requires GCSF support with chemo . \par \par 6/24/2021\par Pt is here to follow up for ovarian cancer\par She is s/p 6 cycles of carbo/taxol\par She feels well today, appetite is good\par She denies abdominal pain, nausea, vomiting or diarrhea\par She still have mild neuropathy on her fingertips and feet\par She received COVID vaccine x 2 doses\par \par 12/20/21\par Pt is here for follow up. Her neuropathy has improved. She has been taking alpha lipoic acid.\par No abd pain, N, v, d, vag bleeding\par \par 4/25/22\par Pt is here for follow up.\par Pt denies abdominal pain or bloating, denies vaginal bleeding or discharge.UTD with mammo, GYN and GI\par \par 9/12/22\par Pt is here for follow up.\par Feels somewhat more fatigued.\par had labs done by Dr Siddharth montiel and found to be anemic with Hgb of 10.6, WBC 3.3 MCV 96, plts 197K\par No bleeding reported\par \par 10/6/22\par Patient here for follow up visit to discuss recent anemia work up results.\par She is feeling well, has no new complaints.\par \par 1/12/23\par Patient is here to follow up for ovarian cancer and anemia.\par She feels well, except for intermittent unusual sensation on RLQ in the last 2 weeks, She denies nausea, vomiting, abdominal pain, abnormal discharge or vaginal bleeding or melena/hematochezia. She stool regularly on a daily basis. She denies shortness of breath, fatigue.\par She is UTD with gyne, PCP and GI. Last colonoscopy was 3 years ago, due this year.\par She is due for annual mammo on 11/2022.\par She has not started taking Iron pills because she gets constipated from hypothyroidism but had increase intake of iron rich foods.\par She tested COVID + on 12/31, retested herself yesterday and was negative. She still have the lingering cough, no fever.

## 2023-01-12 NOTE — ASSESSMENT
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 Will repeat Ferritin pending CBC results.\par - Emphasized to see nephro. Patient expressed understanding.\par - Follow up with gyn, PCP and GI as scheduled\par \par RTC in 4 months\par \par Case was seen and discussed with Dr. Aviles who agreed with assessment and plan.\par \par \par \par \par \par \par \par \par

## 2023-01-12 NOTE — END OF VISIT
[FreeTextEntry3] : I was physically present for the key portions of the evaluation and management service provided. I agree with the history and physical, and plan which I have reviewed and edited where appropriate.\par \par

## 2023-01-13 DIAGNOSIS — D64.9 ANEMIA, UNSPECIFIED: ICD-10-CM

## 2023-01-13 DIAGNOSIS — C56.2 MALIGNANT NEOPLASM OF LEFT OVARY: ICD-10-CM

## 2023-01-13 DIAGNOSIS — Z08 ENCOUNTER FOR FOLLOW-UP EXAMINATION AFTER COMPLETED TREATMENT FOR MALIGNANT NEOPLASM: ICD-10-CM

## 2023-01-13 LAB
CANCER AG125 SERPL-ACNC: 10 U/ML
FERRITIN SERPL-MCNC: 49 NG/ML

## 2023-03-23 ENCOUNTER — APPOINTMENT (OUTPATIENT)
Dept: CARDIOLOGY | Facility: CLINIC | Age: 73
End: 2023-03-23
Payer: MEDICARE

## 2023-03-23 DIAGNOSIS — E11.9 TYPE 2 DIABETES MELLITUS W/OUT COMPLICATIONS: ICD-10-CM

## 2023-03-23 PROCEDURE — 99214 OFFICE O/P EST MOD 30 MIN: CPT | Mod: 25

## 2023-03-23 PROCEDURE — 93000 ELECTROCARDIOGRAM COMPLETE: CPT

## 2023-03-24 PROBLEM — E11.9 DIABETES MELLITUS: Status: ACTIVE | Noted: 2018-02-22

## 2023-04-13 NOTE — ED PROVIDER NOTE - ST/T WAVE
no stemi walking/standing/toileting Spironolactone Counseling: Patient advised regarding risks of diarrhea, abdominal pain, hyperkalemia, birth defects (for female patients), liver toxicity and renal toxicity. The patient may need blood work to monitor liver and kidney function and potassium levels while on therapy. The patient verbalized understanding of the proper use and possible adverse effects of spironolactone.  All of the patient's questions and concerns were addressed.

## 2023-05-18 ENCOUNTER — APPOINTMENT (OUTPATIENT)
Dept: HEMATOLOGY ONCOLOGY | Facility: CLINIC | Age: 73
End: 2023-05-18
Payer: MEDICARE

## 2023-05-18 ENCOUNTER — OUTPATIENT (OUTPATIENT)
Dept: OUTPATIENT SERVICES | Facility: HOSPITAL | Age: 73
LOS: 1 days | End: 2023-05-18
Payer: MEDICARE

## 2023-05-18 ENCOUNTER — LABORATORY RESULT (OUTPATIENT)
Age: 73
End: 2023-05-18

## 2023-05-18 VITALS
RESPIRATION RATE: 18 BRPM | TEMPERATURE: 97.2 F | HEART RATE: 74 BPM | HEIGHT: 61 IN | SYSTOLIC BLOOD PRESSURE: 142 MMHG | BODY MASS INDEX: 34.74 KG/M2 | WEIGHT: 184 LBS | DIASTOLIC BLOOD PRESSURE: 90 MMHG

## 2023-05-18 DIAGNOSIS — Z95.5 PRESENCE OF CORONARY ANGIOPLASTY IMPLANT AND GRAFT: Chronic | ICD-10-CM

## 2023-05-18 DIAGNOSIS — Z90.49 ACQUIRED ABSENCE OF OTHER SPECIFIED PARTS OF DIGESTIVE TRACT: Chronic | ICD-10-CM

## 2023-05-18 DIAGNOSIS — C56.2 MALIGNANT NEOPLASM OF LEFT OVARY: ICD-10-CM

## 2023-05-18 LAB
HCT VFR BLD CALC: 35.2 %
HGB BLD-MCNC: 12.1 G/DL
MCHC RBC-ENTMCNC: 31.8 PG
MCHC RBC-ENTMCNC: 34.4 G/DL
MCV RBC AUTO: 92.6 FL
PLATELET # BLD AUTO: 214 K/UL
PMV BLD: 10.1 FL
RBC # BLD: 3.8 M/UL
RBC # FLD: 13.7 %
WBC # FLD AUTO: 3.59 K/UL

## 2023-05-18 PROCEDURE — 86304 IMMUNOASSAY TUMOR CA 125: CPT

## 2023-05-18 PROCEDURE — 36415 COLL VENOUS BLD VENIPUNCTURE: CPT

## 2023-05-18 PROCEDURE — 80053 COMPREHEN METABOLIC PANEL: CPT

## 2023-05-18 PROCEDURE — 99213 OFFICE O/P EST LOW 20 MIN: CPT

## 2023-05-18 PROCEDURE — 85027 COMPLETE CBC AUTOMATED: CPT

## 2023-05-19 DIAGNOSIS — C56.2 MALIGNANT NEOPLASM OF LEFT OVARY: ICD-10-CM

## 2023-05-19 LAB
ALBUMIN SERPL ELPH-MCNC: 4.1 G/DL
ALP BLD-CCNC: 54 U/L
ALT SERPL-CCNC: 15 U/L
ANION GAP SERPL CALC-SCNC: 12 MMOL/L
AST SERPL-CCNC: 19 U/L
BILIRUB SERPL-MCNC: 2.1 MG/DL
BUN SERPL-MCNC: 24 MG/DL
CALCIUM SERPL-MCNC: 9.5 MG/DL
CANCER AG125 SERPL-ACNC: 8 U/ML
CHLORIDE SERPL-SCNC: 104 MMOL/L
CO2 SERPL-SCNC: 25 MMOL/L
CREAT SERPL-MCNC: 0.7 MG/DL
EGFR: 91 ML/MIN/1.73M2
GLUCOSE SERPL-MCNC: 111 MG/DL
POTASSIUM SERPL-SCNC: 4.1 MMOL/L
PROT SERPL-MCNC: 6.7 G/DL
SODIUM SERPL-SCNC: 141 MMOL/L

## 2023-05-23 NOTE — PATIENT PROFILE ADULT - STATED REASON FOR ADMISSION
Dry Needling Initial Consultation: ([facility])    History:    Available medical records and history have been reviewed with the client.     Potential Contraindications:   1) Currently pregnant?: No (contraindicated 1st trimester: clinical decision 2nd and 3rd)  2) Are you currently being treated for cancer, or other local or systemic infections including the flu? No   3) Are you diagnosed with a lymphatic compromise? No   4) Do you have any cosmetic implants as this may limit the areas we can needle? No   5) Do you have any other types of implants, medication, stimulation devices such as pacemaker and or spinal devices? Yes: pacemaker     Precautions:   1) History of fainting or seizure? No  2) Currently taking blood thinners (eg aspirin, warfarin, coumadin) or told you have an abnormal bleeding disorder? Yes: daily baby aspirin   3) Currently taking antibiotics or antiviral medication, or have Lyme disease? No  4) History of damaged heart valve or other vascular disorders? No  5) History of spontaneous pneumothorax? No   6) Have you had surgery in the past 12 months and if so what and when? No   7) History of metal allergies such as nickel and chromium? No  8) History of diabetes, impaired wound healing or areas where you have sensory loss? Yes: type 2 diabetic  9) History of Hepatitis B or C, HIV or other infectious diseases? No  10) Eaten in last 2 hours? Yes.    Client has been educated on the risks and precautions of dry needling techniques bleeding, broken/retained needle, bruising, fainting/dizziness, infection, internal organ injury, nerve injury, pain during and after treatment, pneumothorax resulting potentially in death, sweating, vasovagal response including cardiac arrest and agrees with verbal consent to continue with this treatment 5/23/2023: Client understands this is not billed to insurance and is a cash based service. Dry needling was performed by Shani Denise PT.     Todays findings:    Patient reports since the end of January which she did fall with shoveling but isn't sure if that is what did.  Pain is both shoulders right worse than left and neck pain as well.  Current pain right shoulder: 7/10    Physical Examination:  Range of motion deficits: certain motions catch her-knife life symptoms    Client presents with taut bands and painful MTrP (myofascial trigger points) that warranted treatment as listed below.    Treatment:     The following MTrP (Myofascial Trigger Points) were treated today:    supine  Right deltoid (direct and thread) using x 0.3 mm x 40 mm needle.      All 9 needles were accounted for at end of treatment.        Time Out performed at 1253p, with patient verifying procedure and consent prior to performing the procedure.    Sign Out performed at 110p, with patient verifying procedure performed upon completion.      Post session findings:     Session today focused on needling into patients right deltoid around the deltoid insertion.  Patient is very tender to touch in this region with fairly tight tissue.  She has good tolerance with session other than one sharp feeling towards the end.  She has no change in symptoms post needling session.  Recommendation is to: Refer patient back to physician  Post-care instructions on ice or heat for muscle soreness, continue proper hydration and nutrition, and continue normal movements and exercise throughout the day were provided.      surgery

## 2023-06-20 ENCOUNTER — APPOINTMENT (OUTPATIENT)
Dept: GYNECOLOGIC ONCOLOGY | Facility: CLINIC | Age: 73
End: 2023-06-20
Payer: MEDICARE

## 2023-06-20 VITALS
SYSTOLIC BLOOD PRESSURE: 128 MMHG | WEIGHT: 183 LBS | HEIGHT: 61 IN | BODY MASS INDEX: 34.55 KG/M2 | DIASTOLIC BLOOD PRESSURE: 77 MMHG

## 2023-06-20 PROCEDURE — 99213 OFFICE O/P EST LOW 20 MIN: CPT

## 2023-06-20 NOTE — HISTORY OF PRESENT ILLNESS
[FreeTextEntry1] : 72 year old patient of Dr. Ham,  ( x2). 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 Pathology reported FIGO 1 C3 (positive cytology). She was given 6 cycles of chemotherapy by Dr. Aviles She continues on Eloquis 5mg qd for A-Fib. Treatment was completed in 2021.  is negative (8). She presents for follow-up and offers no complaints.\par \par - S/P Carbo/Taxol x 6 cycles in \par - VINICIUS at this time\par - Followed by Dr. Moran\par .

## 2023-06-22 ENCOUNTER — APPOINTMENT (OUTPATIENT)
Dept: CARDIOLOGY | Facility: CLINIC | Age: 73
End: 2023-06-22
Payer: MEDICARE

## 2023-06-22 DIAGNOSIS — R94.31 ABNORMAL ELECTROCARDIOGRAM [ECG] [EKG]: ICD-10-CM

## 2023-06-22 DIAGNOSIS — E11.9 TYPE 2 DIABETES MELLITUS W/OUT COMPLICATIONS: ICD-10-CM

## 2023-06-22 PROCEDURE — 93306 TTE W/DOPPLER COMPLETE: CPT

## 2023-06-22 PROCEDURE — ZZZZZ: CPT

## 2023-06-22 PROCEDURE — 99214 OFFICE O/P EST MOD 30 MIN: CPT | Mod: 25

## 2023-06-22 PROCEDURE — 93000 ELECTROCARDIOGRAM COMPLETE: CPT

## 2023-06-26 PROBLEM — E11.9 DIABETES: Status: ACTIVE | Noted: 2018-02-22

## 2023-06-26 PROBLEM — R94.31 ABNORMAL ELECTROCARDIOGRAM [ECG] [EKG]: Status: ACTIVE | Noted: 2020-08-11

## 2023-06-29 ENCOUNTER — APPOINTMENT (OUTPATIENT)
Dept: ELECTROPHYSIOLOGY | Facility: CLINIC | Age: 73
End: 2023-06-29

## 2023-07-18 ENCOUNTER — OUTPATIENT (OUTPATIENT)
Dept: OUTPATIENT SERVICES | Facility: HOSPITAL | Age: 73
LOS: 1 days | End: 2023-07-18
Payer: MEDICARE

## 2023-07-18 VITALS
OXYGEN SATURATION: 98 % | SYSTOLIC BLOOD PRESSURE: 128 MMHG | RESPIRATION RATE: 16 BRPM | HEART RATE: 76 BPM | HEIGHT: 61 IN | TEMPERATURE: 98 F | WEIGHT: 186.95 LBS | DIASTOLIC BLOOD PRESSURE: 78 MMHG

## 2023-07-18 DIAGNOSIS — Z01.818 ENCOUNTER FOR OTHER PREPROCEDURAL EXAMINATION: ICD-10-CM

## 2023-07-18 DIAGNOSIS — Z90.710 ACQUIRED ABSENCE OF BOTH CERVIX AND UTERUS: Chronic | ICD-10-CM

## 2023-07-18 DIAGNOSIS — M77.32 CALCANEAL SPUR, LEFT FOOT: ICD-10-CM

## 2023-07-18 DIAGNOSIS — Z90.49 ACQUIRED ABSENCE OF OTHER SPECIFIED PARTS OF DIGESTIVE TRACT: Chronic | ICD-10-CM

## 2023-07-18 DIAGNOSIS — Z95.5 PRESENCE OF CORONARY ANGIOPLASTY IMPLANT AND GRAFT: Chronic | ICD-10-CM

## 2023-07-18 LAB
A1C WITH ESTIMATED AVERAGE GLUCOSE RESULT: 6.7 % — HIGH (ref 4–5.6)
ALBUMIN SERPL ELPH-MCNC: 4.1 G/DL — SIGNIFICANT CHANGE UP (ref 3.5–5.2)
ALP SERPL-CCNC: 62 U/L — SIGNIFICANT CHANGE UP (ref 30–115)
ALT FLD-CCNC: 14 U/L — SIGNIFICANT CHANGE UP (ref 0–41)
ANION GAP SERPL CALC-SCNC: 11 MMOL/L — SIGNIFICANT CHANGE UP (ref 7–14)
APTT BLD: 35.1 SEC — SIGNIFICANT CHANGE UP (ref 27–39.2)
AST SERPL-CCNC: 19 U/L — SIGNIFICANT CHANGE UP (ref 0–41)
BASOPHILS # BLD AUTO: 0.04 K/UL — SIGNIFICANT CHANGE UP (ref 0–0.2)
BASOPHILS NFR BLD AUTO: 1.4 % — HIGH (ref 0–1)
BILIRUB SERPL-MCNC: 2.5 MG/DL — HIGH (ref 0.2–1.2)
BUN SERPL-MCNC: 16 MG/DL — SIGNIFICANT CHANGE UP (ref 10–20)
CALCIUM SERPL-MCNC: 9.5 MG/DL — SIGNIFICANT CHANGE UP (ref 8.4–10.5)
CHLORIDE SERPL-SCNC: 104 MMOL/L — SIGNIFICANT CHANGE UP (ref 98–110)
CO2 SERPL-SCNC: 25 MMOL/L — SIGNIFICANT CHANGE UP (ref 17–32)
CREAT SERPL-MCNC: 0.8 MG/DL — SIGNIFICANT CHANGE UP (ref 0.7–1.5)
EGFR: 78 ML/MIN/1.73M2 — SIGNIFICANT CHANGE UP
EOSINOPHIL # BLD AUTO: 0.1 K/UL — SIGNIFICANT CHANGE UP (ref 0–0.7)
EOSINOPHIL NFR BLD AUTO: 3.5 % — SIGNIFICANT CHANGE UP (ref 0–8)
ESTIMATED AVERAGE GLUCOSE: 146 MG/DL — HIGH (ref 68–114)
GLUCOSE SERPL-MCNC: 127 MG/DL — HIGH (ref 70–99)
HCT VFR BLD CALC: 36.8 % — LOW (ref 37–47)
HGB BLD-MCNC: 12.2 G/DL — SIGNIFICANT CHANGE UP (ref 12–16)
IMM GRANULOCYTES NFR BLD AUTO: 0.4 % — HIGH (ref 0.1–0.3)
INR BLD: 1.41 RATIO — HIGH (ref 0.65–1.3)
LYMPHOCYTES # BLD AUTO: 1.14 K/UL — LOW (ref 1.2–3.4)
LYMPHOCYTES # BLD AUTO: 40 % — SIGNIFICANT CHANGE UP (ref 20.5–51.1)
MCHC RBC-ENTMCNC: 31.9 PG — HIGH (ref 27–31)
MCHC RBC-ENTMCNC: 33.2 G/DL — SIGNIFICANT CHANGE UP (ref 32–37)
MCV RBC AUTO: 96.1 FL — SIGNIFICANT CHANGE UP (ref 81–99)
MONOCYTES # BLD AUTO: 0.36 K/UL — SIGNIFICANT CHANGE UP (ref 0.1–0.6)
MONOCYTES NFR BLD AUTO: 12.6 % — HIGH (ref 1.7–9.3)
NEUTROPHILS # BLD AUTO: 1.2 K/UL — LOW (ref 1.4–6.5)
NEUTROPHILS NFR BLD AUTO: 42.1 % — LOW (ref 42.2–75.2)
NRBC # BLD: 0 /100 WBCS — SIGNIFICANT CHANGE UP (ref 0–0)
PLATELET # BLD AUTO: 214 K/UL — SIGNIFICANT CHANGE UP (ref 130–400)
PMV BLD: 11.5 FL — HIGH (ref 7.4–10.4)
POTASSIUM SERPL-MCNC: 4.4 MMOL/L — SIGNIFICANT CHANGE UP (ref 3.5–5)
POTASSIUM SERPL-SCNC: 4.4 MMOL/L — SIGNIFICANT CHANGE UP (ref 3.5–5)
PROT SERPL-MCNC: 6.9 G/DL — SIGNIFICANT CHANGE UP (ref 6–8)
PROTHROM AB SERPL-ACNC: 16.3 SEC — HIGH (ref 9.95–12.87)
RBC # BLD: 3.83 M/UL — LOW (ref 4.2–5.4)
RBC # FLD: 13.9 % — SIGNIFICANT CHANGE UP (ref 11.5–14.5)
SODIUM SERPL-SCNC: 140 MMOL/L — SIGNIFICANT CHANGE UP (ref 135–146)
WBC # BLD: 2.85 K/UL — LOW (ref 4.8–10.8)
WBC # FLD AUTO: 2.85 K/UL — LOW (ref 4.8–10.8)

## 2023-07-18 PROCEDURE — 85610 PROTHROMBIN TIME: CPT

## 2023-07-18 PROCEDURE — 99214 OFFICE O/P EST MOD 30 MIN: CPT | Mod: 25

## 2023-07-18 PROCEDURE — 80053 COMPREHEN METABOLIC PANEL: CPT

## 2023-07-18 PROCEDURE — 85025 COMPLETE CBC W/AUTO DIFF WBC: CPT

## 2023-07-18 PROCEDURE — 85730 THROMBOPLASTIN TIME PARTIAL: CPT

## 2023-07-18 PROCEDURE — 93005 ELECTROCARDIOGRAM TRACING: CPT

## 2023-07-18 PROCEDURE — 36415 COLL VENOUS BLD VENIPUNCTURE: CPT

## 2023-07-18 PROCEDURE — 71046 X-RAY EXAM CHEST 2 VIEWS: CPT | Mod: 26

## 2023-07-18 PROCEDURE — 83036 HEMOGLOBIN GLYCOSYLATED A1C: CPT

## 2023-07-18 PROCEDURE — 71046 X-RAY EXAM CHEST 2 VIEWS: CPT

## 2023-07-18 PROCEDURE — 97116 GAIT TRAINING THERAPY: CPT | Mod: GP

## 2023-07-18 PROCEDURE — 93010 ELECTROCARDIOGRAM REPORT: CPT

## 2023-07-18 NOTE — H&P PST ADULT - NSICDXPASTMEDICALHX_GEN_ALL_CORE_FT
PAST MEDICAL HISTORY:  Arthritis OA    Atrial fibrillation CARDIAC STENT    Coronary heart disease     Diabetes     GERD (gastroesophageal reflux disease)     High cholesterol     Hypertension     Hypothyroid PT REPORTS- I HAVE BEEN ON THE SAME DOSE OF MEDICATIONS FOR ABOUT 1.5 YRS.  THYROIDS LEVELS WHERE 6 MONTHS AGO.    Pneumonia x2  2017

## 2023-07-18 NOTE — H&P PST ADULT - NSICDXFAMILYHX_GEN_ALL_CORE_FT
FAMILY HISTORY:  No pertinent family history in first degree relatives     FAMILY HISTORY:  Father  Still living? Unknown  Family history of diabetes mellitus (DM), Age at diagnosis: Age Unknown    Mother  Still living? Unknown  FH: CAD (coronary artery disease), Age at diagnosis: Age Unknown    Sibling  Still living? Unknown  Family history of CKD (chronic kidney disease), Age at diagnosis: Age Unknown  Family history of diabetes mellitus (DM), Age at diagnosis: Age Unknown

## 2023-07-18 NOTE — H&P PST ADULT - HISTORY OF PRESENT ILLNESS
74 Y/O FEMALE PT TO PAST WITH C/O LEFT HEEL SPUR, TORN LEFT ACHILLES. PT C/O SHARP PAIN FOR              PT NOW FOR SCHEDULED PROCEDURE (       . PT DENIES ANY CP SOB PALP COUGH DYSURIA FEVER URI. PT ABLE TO JOSE EDUARDO 1-2 FOS W/O SOB  Anesthesia Alert  NO--Difficult Airway  NO--History of neck surgery or radiation  NO--Limited ROM of neck  NO--History of Malignant hyperthermia  NO--Personal or family history of Pseudocholinesterase deficiency.  NO--Prior Anesthesia Complication  NO--Latex Allergy  NO--Loose teeth  NO--History of Rheumatoid Arthritis  NO--TIFFANIE  NO--Bleeding risk  NO--Other_____   74 Y/O FEMALE PT TO PAST WITH C/O LEFT HEEL SPUR, PAIN TO  LEFT ACHILLES. PT C/O SHARP PAIN FOR  PAST 4 MO  PT NOW FOR SCHEDULED PROCEDURE ( RESECTION OF RETROCALCANEAL SPUR WITH DETACHMENT REATTACHMENT, OF ACHILLES TENDON)   . PT DENIES ANY CP SOB PALP COUGH DYSURIA FEVER URI. PT ABLE TO JOSE EDUARDO 2 FOS W/O SOB, LIMITED SECONDARY TO PAIN   Anesthesia Alert  JEHOVAH WITNESS  NO--Difficult Airway  NO--History of neck surgery or radiation  NO--Limited ROM of neck  NO--History of Malignant hyperthermia  NO--Personal or family history of Pseudocholinesterase deficiency.  NO--Prior Anesthesia Complication  NO--Latex Allergy  NO--Loose teeth  NO--History of Rheumatoid Arthritis  NO--TIFFANIE  NO--Bleeding risk  NO--Other_____

## 2023-07-18 NOTE — H&P PST ADULT - NSICDXPASTSURGICALHX_GEN_ALL_CORE_FT
PAST SURGICAL HISTORY:  H/O right coronary artery stent placement     History of cholecystectomy      PAST SURGICAL HISTORY:  H/O right coronary artery stent placement     History of cholecystectomy     History of hysterectomy

## 2023-07-19 ENCOUNTER — NON-APPOINTMENT (OUTPATIENT)
Age: 73
End: 2023-07-19

## 2023-07-19 DIAGNOSIS — Z01.818 ENCOUNTER FOR OTHER PREPROCEDURAL EXAMINATION: ICD-10-CM

## 2023-07-19 DIAGNOSIS — M77.32 CALCANEAL SPUR, LEFT FOOT: ICD-10-CM

## 2023-07-20 ENCOUNTER — APPOINTMENT (OUTPATIENT)
Dept: ELECTROPHYSIOLOGY | Facility: CLINIC | Age: 73
End: 2023-07-20
Payer: MEDICARE

## 2023-07-20 VITALS
WEIGHT: 183 LBS | HEART RATE: 68 BPM | TEMPERATURE: 98 F | DIASTOLIC BLOOD PRESSURE: 70 MMHG | SYSTOLIC BLOOD PRESSURE: 122 MMHG | BODY MASS INDEX: 34.55 KG/M2 | HEIGHT: 61 IN

## 2023-07-20 PROCEDURE — 93000 ELECTROCARDIOGRAM COMPLETE: CPT

## 2023-07-20 PROCEDURE — 99214 OFFICE O/P EST MOD 30 MIN: CPT | Mod: 25

## 2023-07-20 RX ORDER — ATORVASTATIN CALCIUM 80 MG/1
80 TABLET, FILM COATED ORAL DAILY
Refills: 0 | Status: ACTIVE | COMMUNITY

## 2023-07-20 RX ORDER — ASPIRIN 81 MG/1
81 TABLET ORAL DAILY
Refills: 0 | Status: COMPLETED | COMMUNITY
End: 2023-07-20

## 2023-07-20 RX ORDER — PANTOPRAZOLE SODIUM 40 MG/1
40 TABLET, DELAYED RELEASE ORAL DAILY
Refills: 0 | Status: COMPLETED | COMMUNITY
End: 2023-07-20

## 2023-07-20 RX ORDER — MAGNESIUM OXIDE 241.3 MG/1000MG
400 TABLET ORAL TWICE DAILY
Qty: 14 | Refills: 1 | Status: COMPLETED | COMMUNITY
Start: 2021-03-22 | End: 2023-07-20

## 2023-07-20 RX ORDER — METFORMIN HYDROCHLORIDE 1000 MG/1
1000 TABLET, EXTENDED RELEASE ORAL TWICE DAILY
Refills: 0 | Status: COMPLETED | COMMUNITY
End: 2023-07-20

## 2023-07-27 NOTE — ASU PATIENT PROFILE, ADULT - FALL HARM RISK - UNIVERSAL INTERVENTIONS
Bed in lowest position, wheels locked, appropriate side rails in place/Call bell, personal items and telephone in reach/Instruct patient to call for assistance before getting out of bed or chair/Non-slip footwear when patient is out of bed/[place] to call system/Physically safe environment - no spills, clutter or unnecessary equipment/Purposeful Proactive Rounding/Room/bathroom lighting operational, light cord in reach

## 2023-07-27 NOTE — ASU PATIENT PROFILE, ADULT - NS PRO LACT YNNA
no

## 2023-07-27 NOTE — ASU PATIENT PROFILE, ADULT - NSICDXPASTSURGICALHX_GEN_ALL_CORE_FT
PAST SURGICAL HISTORY:  H/O right coronary artery stent placement     History of cholecystectomy     History of hysterectomy

## 2023-07-28 ENCOUNTER — TRANSCRIPTION ENCOUNTER (OUTPATIENT)
Age: 73
End: 2023-07-28

## 2023-07-28 ENCOUNTER — OUTPATIENT (OUTPATIENT)
Dept: OUTPATIENT SERVICES | Facility: HOSPITAL | Age: 73
LOS: 1 days | Discharge: HOME | End: 2023-07-28
Payer: MEDICARE

## 2023-07-28 VITALS
TEMPERATURE: 98 F | DIASTOLIC BLOOD PRESSURE: 72 MMHG | OXYGEN SATURATION: 100 % | HEIGHT: 61 IN | HEART RATE: 68 BPM | SYSTOLIC BLOOD PRESSURE: 186 MMHG | RESPIRATION RATE: 18 BRPM | WEIGHT: 186.95 LBS

## 2023-07-28 VITALS
SYSTOLIC BLOOD PRESSURE: 150 MMHG | DIASTOLIC BLOOD PRESSURE: 68 MMHG | HEART RATE: 54 BPM | RESPIRATION RATE: 16 BRPM | OXYGEN SATURATION: 99 %

## 2023-07-28 DIAGNOSIS — Z90.49 ACQUIRED ABSENCE OF OTHER SPECIFIED PARTS OF DIGESTIVE TRACT: Chronic | ICD-10-CM

## 2023-07-28 DIAGNOSIS — M77.32 CALCANEAL SPUR, LEFT FOOT: ICD-10-CM

## 2023-07-28 DIAGNOSIS — Z90.710 ACQUIRED ABSENCE OF BOTH CERVIX AND UTERUS: Chronic | ICD-10-CM

## 2023-07-28 DIAGNOSIS — M66.362 SPONTANEOUS RUPTURE OF FLEXOR TENDONS, LEFT LOWER LEG: ICD-10-CM

## 2023-07-28 DIAGNOSIS — Z95.5 PRESENCE OF CORONARY ANGIOPLASTY IMPLANT AND GRAFT: Chronic | ICD-10-CM

## 2023-07-28 PROCEDURE — 93005 ELECTROCARDIOGRAM TRACING: CPT

## 2023-07-28 PROCEDURE — 93010 ELECTROCARDIOGRAM REPORT: CPT

## 2023-07-28 PROCEDURE — 88304 TISSUE EXAM BY PATHOLOGIST: CPT

## 2023-07-28 PROCEDURE — 88311 DECALCIFY TISSUE: CPT

## 2023-07-28 PROCEDURE — C1713: CPT

## 2023-07-28 PROCEDURE — C9399: CPT

## 2023-07-28 RX ORDER — ATORVASTATIN CALCIUM 80 MG/1
1 TABLET, FILM COATED ORAL
Qty: 0 | Refills: 0 | DISCHARGE

## 2023-07-28 RX ORDER — ASPIRIN/CALCIUM CARB/MAGNESIUM 324 MG
1 TABLET ORAL
Qty: 0 | Refills: 0 | DISCHARGE

## 2023-07-28 RX ORDER — LIRAGLUTIDE 6 MG/ML
1.2 INJECTION SUBCUTANEOUS
Refills: 0 | DISCHARGE

## 2023-07-28 RX ORDER — LEVOTHYROXINE SODIUM 125 MCG
1 TABLET ORAL
Qty: 0 | Refills: 0 | DISCHARGE

## 2023-07-28 RX ORDER — SODIUM CHLORIDE 9 MG/ML
1000 INJECTION, SOLUTION INTRAVENOUS
Refills: 0 | Status: DISCONTINUED | OUTPATIENT
Start: 2023-07-28 | End: 2023-07-28

## 2023-07-28 RX ORDER — PANTOPRAZOLE SODIUM 20 MG/1
1 TABLET, DELAYED RELEASE ORAL
Qty: 0 | Refills: 0 | DISCHARGE

## 2023-07-28 RX ORDER — HYDROMORPHONE HYDROCHLORIDE 2 MG/ML
0.5 INJECTION INTRAMUSCULAR; INTRAVENOUS; SUBCUTANEOUS
Refills: 0 | Status: DISCONTINUED | OUTPATIENT
Start: 2023-07-28 | End: 2023-07-28

## 2023-07-28 RX ORDER — AMLODIPINE BESYLATE 2.5 MG/1
1 TABLET ORAL
Qty: 0 | Refills: 0 | DISCHARGE

## 2023-07-28 RX ORDER — OXYCODONE HYDROCHLORIDE 5 MG/1
5 TABLET ORAL ONCE
Refills: 0 | Status: DISCONTINUED | OUTPATIENT
Start: 2023-07-28 | End: 2023-07-28

## 2023-07-28 RX ORDER — METFORMIN HYDROCHLORIDE 850 MG/1
1 TABLET ORAL
Qty: 0 | Refills: 0 | DISCHARGE

## 2023-07-28 RX ORDER — LOSARTAN POTASSIUM 100 MG/1
1 TABLET, FILM COATED ORAL
Qty: 0 | Refills: 0 | DISCHARGE

## 2023-07-28 RX ORDER — SOTALOL HCL 120 MG
1 TABLET ORAL
Qty: 0 | Refills: 0 | DISCHARGE

## 2023-07-28 RX ORDER — LIRAGLUTIDE 6 MG/ML
0 INJECTION SUBCUTANEOUS
Qty: 0 | Refills: 0 | DISCHARGE

## 2023-07-28 RX ORDER — ONDANSETRON 8 MG/1
4 TABLET, FILM COATED ORAL ONCE
Refills: 0 | Status: DISCONTINUED | OUTPATIENT
Start: 2023-07-28 | End: 2023-07-28

## 2023-07-28 RX ADMIN — SODIUM CHLORIDE 100 MILLILITER(S): 9 INJECTION, SOLUTION INTRAVENOUS at 12:39

## 2023-07-28 NOTE — ASU DISCHARGE PLAN (ADULT/PEDIATRIC) - CARE PROVIDER_API CALL
George Canas  Podiatric Medicine and Surgery  [address] [place] [address]  Phone: [phone]  Fax: [phone]  Established Patient  Follow Up Time:

## 2023-07-28 NOTE — BRIEF OPERATIVE NOTE - COMMENTS
30 cc mixture of 2% lidocaine plain and 0.5% marcaine plain, 1:1 pre-op  8 cc of 0.5% marcaine, 2cc dexamethasone post-op   Posterior splint applied

## 2023-07-28 NOTE — BRIEF OPERATIVE NOTE - NSICDXBRIEFPROCEDURE_GEN_ALL_CORE_FT
PROCEDURES:  Achilles tendon repair 28-Jul-2023 12:18:21  Marisa Yates  Excision of retrocalcaneal spur of left heel 28-Jul-2023 12:18:37  Marisa Yates

## 2023-07-28 NOTE — PRE-ANESTHESIA EVALUATION ADULT - NSANTHADDINFOFT_GEN_ALL_CORE
GA planned; Risks discussed including dental injury and more serious complications including cardiac and pulmonary complications and stroke.  Patient expresses understanding with regard to risks of anesthesia and wishes to proceed.
Mouth pain in pediatric patient

## 2023-07-28 NOTE — PRE-ANESTHESIA EVALUATION ADULT - NSPROPOSEDPROCEDFT_GEN_ALL_CORE
resection of retrocalcaneal spur with detachment, repair and reattachment of the achilles tendon, left

## 2023-07-28 NOTE — PRE-ANESTHESIA EVALUATION ADULT - NSANTHPMHFT_GEN_ALL_CORE
CAD s/p stent in 2002. pAF on Eliquis and Sotalol.     METS>4 without CP/palpitations/sob  Denies orthopnea

## 2023-07-31 LAB — SURGICAL PATHOLOGY STUDY: SIGNIFICANT CHANGE UP

## 2023-08-02 DIAGNOSIS — S86.012A STRAIN OF LEFT ACHILLES TENDON, INITIAL ENCOUNTER: ICD-10-CM

## 2023-08-02 DIAGNOSIS — I48.0 PAROXYSMAL ATRIAL FIBRILLATION: ICD-10-CM

## 2023-08-02 DIAGNOSIS — Z90.710 ACQUIRED ABSENCE OF BOTH CERVIX AND UTERUS: ICD-10-CM

## 2023-08-02 DIAGNOSIS — Z79.85 LONG-TERM (CURRENT) USE OF INJECTABLE NON-INSULIN ANTIDIABETIC DRUGS: ICD-10-CM

## 2023-08-02 DIAGNOSIS — Z91.041 RADIOGRAPHIC DYE ALLERGY STATUS: ICD-10-CM

## 2023-08-02 DIAGNOSIS — M77.32 CALCANEAL SPUR, LEFT FOOT: ICD-10-CM

## 2023-08-02 DIAGNOSIS — E11.9 TYPE 2 DIABETES MELLITUS WITHOUT COMPLICATIONS: ICD-10-CM

## 2023-08-02 DIAGNOSIS — Z79.82 LONG TERM (CURRENT) USE OF ASPIRIN: ICD-10-CM
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
## 2023-08-02 DIAGNOSIS — K21.9 GASTRO-ESOPHAGEAL REFLUX DISEASE WITHOUT ESOPHAGITIS: ICD-10-CM

## 2023-08-02 DIAGNOSIS — I25.10 ATHEROSCLEROTIC HEART DISEASE OF NATIVE CORONARY ARTERY WITHOUT ANGINA PECTORIS: ICD-10-CM

## 2023-08-02 DIAGNOSIS — Z79.84 LONG TERM (CURRENT) USE OF ORAL HYPOGLYCEMIC DRUGS: ICD-10-CM

## 2023-08-02 DIAGNOSIS — M19.90 UNSPECIFIED OSTEOARTHRITIS, UNSPECIFIED SITE: ICD-10-CM
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
## 2023-08-02 DIAGNOSIS — Z79.01 LONG TERM (CURRENT) USE OF ANTICOAGULANTS: ICD-10-CM

## 2023-08-02 DIAGNOSIS — Z95.5 PRESENCE OF CORONARY ANGIOPLASTY IMPLANT AND GRAFT: ICD-10-CM

## 2023-08-02 DIAGNOSIS — E78.00 PURE HYPERCHOLESTEROLEMIA, UNSPECIFIED: ICD-10-CM

## 2023-08-02 DIAGNOSIS — E03.9 HYPOTHYROIDISM, UNSPECIFIED: ICD-10-CM

## 2023-08-02 DIAGNOSIS — I10 ESSENTIAL (PRIMARY) HYPERTENSION: ICD-10-CM

## 2023-08-02 DIAGNOSIS — Z90.49 ACQUIRED ABSENCE OF OTHER SPECIFIED PARTS OF DIGESTIVE TRACT: ICD-10-CM

## 2023-08-03 NOTE — ASSESSMENT
[FreeTextEntry1] : 72 years old female with pmh of HTN, CAD, S/P PTCA with stent 2002, DM, Symptomatic   PAF dx in 10/2018 \par on Sotalol 80 mg BID for rhythm control \par \par \par AF\par -Continue  Eliquis 5 mg BID ( CHADS- VASc - 4, CAD, DM, FEMALE, HTN ); no bleeding\par - Continue Sotalol 80 mg BID \par - Patient can stop Eliquis 3 days prior to surgery. Risks and benefits explained to the patient. \par \par \par \par HTN\par - cont losartan 100 mg qd\par

## 2023-08-03 NOTE — HISTORY OF PRESENT ILLNESS
[FreeTextEntry1] : 72 years old female with PAF dx in 10/2018 , on Sotalol 80 mg BID and Eliqus 10/31/2018 she returns for a follow up . \par \par She denies CP/SOB.  Reports rare palpitations , brief in duration lasting seconds .  \par No dizziness, or syncope .Good activity tolerance \par Denies PEREZ  Pt finished chemo for ovarian CA\par \par Patient doing great. No signs of recurrence of atrial fibrillation. Patient is scheduled for heel spur surgery next month.\par \par \par EKG (07/20/2023): Sinus rhythm at 68 bpm\par EKG (12/8/2022) sinus rhythm at 82 BPM.\par Labs from (9/12/2022) were normal, creatinine 0.6\par \par EKG SR 76 bpm Qtc 430\par \par ECG ( 3/21/2019) - NSR at 81 bpm, QTc 432 ms , non specific TWI \par ECG ( 5/14/2018)- NSR at 66 bpm, normal [place], QRS, QTc 444 ms

## 2023-08-03 NOTE — ADDENDUM
[FreeTextEntry1] : Shazia BRADY assisted in documentation on 07/20/2023   acting as a scribe for Dr. Miles Forman.\par \par

## 2023-09-07 ENCOUNTER — RX RENEWAL (OUTPATIENT)
Age: 73
End: 2023-09-07

## 2023-09-07 RX ORDER — AMLODIPINE BESYLATE 2.5 MG/1
2.5 TABLET ORAL TWICE DAILY
Qty: 180 | Refills: 3 | Status: ACTIVE | COMMUNITY
Start: 2021-09-28 | End: 1900-01-01

## 2023-09-14 ENCOUNTER — APPOINTMENT (OUTPATIENT)
Dept: HEMATOLOGY ONCOLOGY | Facility: CLINIC | Age: 73
End: 2023-09-14
Payer: MEDICARE

## 2023-09-14 ENCOUNTER — OUTPATIENT (OUTPATIENT)
Dept: OUTPATIENT SERVICES | Facility: HOSPITAL | Age: 73
LOS: 1 days | End: 2023-09-14
Payer: MEDICARE

## 2023-09-14 ENCOUNTER — LABORATORY RESULT (OUTPATIENT)
Age: 73
End: 2023-09-14

## 2023-09-14 VITALS
WEIGHT: 188 LBS | HEIGHT: 61 IN | BODY MASS INDEX: 35.5 KG/M2 | TEMPERATURE: 97.3 F | SYSTOLIC BLOOD PRESSURE: 122 MMHG | HEART RATE: 75 BPM | DIASTOLIC BLOOD PRESSURE: 59 MMHG

## 2023-09-14 DIAGNOSIS — C56.2 MALIGNANT NEOPLASM OF LEFT OVARY: ICD-10-CM

## 2023-09-14 DIAGNOSIS — D64.9 ANEMIA, UNSPECIFIED: ICD-10-CM

## 2023-09-14 DIAGNOSIS — Z90.710 ACQUIRED ABSENCE OF BOTH CERVIX AND UTERUS: Chronic | ICD-10-CM

## 2023-09-14 DIAGNOSIS — Z90.49 ACQUIRED ABSENCE OF OTHER SPECIFIED PARTS OF DIGESTIVE TRACT: Chronic | ICD-10-CM

## 2023-09-14 DIAGNOSIS — Z95.5 PRESENCE OF CORONARY ANGIOPLASTY IMPLANT AND GRAFT: Chronic | ICD-10-CM

## 2023-09-14 LAB
ALBUMIN SERPL ELPH-MCNC: 4.1 G/DL
ALP BLD-CCNC: 61 U/L
ALT SERPL-CCNC: 14 U/L
ANION GAP SERPL CALC-SCNC: 13 MMOL/L
AST SERPL-CCNC: 19 U/L
BILIRUB SERPL-MCNC: 2.2 MG/DL
BUN SERPL-MCNC: 22 MG/DL
CALCIUM SERPL-MCNC: 9.7 MG/DL
CHLORIDE SERPL-SCNC: 104 MMOL/L
CO2 SERPL-SCNC: 24 MMOL/L
CREAT SERPL-MCNC: 0.9 MG/DL
EGFR: 68 ML/MIN/1.73M2
GLUCOSE SERPL-MCNC: 163 MG/DL
HCT VFR BLD CALC: 35.2 %
HGB BLD-MCNC: 12.1 G/DL
MCHC RBC-ENTMCNC: 31.9 PG
MCHC RBC-ENTMCNC: 34.4 G/DL
MCV RBC AUTO: 92.9 FL
PLATELET # BLD AUTO: 205 K/UL
PMV BLD: 10.4 FL
POTASSIUM SERPL-SCNC: 4.2 MMOL/L
PROT SERPL-MCNC: 6.9 G/DL
RBC # BLD: 3.79 M/UL
RBC # FLD: 13.4 %
SODIUM SERPL-SCNC: 141 MMOL/L
WBC # FLD AUTO: 3.83 K/UL

## 2023-09-14 PROCEDURE — 86304 IMMUNOASSAY TUMOR CA 125: CPT

## 2023-09-14 PROCEDURE — 99213 OFFICE O/P EST LOW 20 MIN: CPT

## 2023-09-14 PROCEDURE — 36415 COLL VENOUS BLD VENIPUNCTURE: CPT

## 2023-09-14 PROCEDURE — 85027 COMPLETE CBC AUTOMATED: CPT

## 2023-09-14 PROCEDURE — 80053 COMPREHEN METABOLIC PANEL: CPT

## 2023-09-15 LAB — CANCER AG125 SERPL-ACNC: 9 U/ML

## 2023-09-16 PROBLEM — D64.9 ANEMIA: Status: ACTIVE | Noted: 2022-09-21

## 2023-10-03 ENCOUNTER — RX RENEWAL (OUTPATIENT)
Age: 73
End: 2023-10-03

## 2023-10-03 ENCOUNTER — APPOINTMENT (OUTPATIENT)
Dept: CARDIOLOGY | Facility: CLINIC | Age: 73
End: 2023-10-03
Payer: MEDICARE

## 2023-10-03 PROCEDURE — 99214 OFFICE O/P EST MOD 30 MIN: CPT | Mod: 25

## 2023-10-03 PROCEDURE — 93000 ELECTROCARDIOGRAM COMPLETE: CPT

## 2023-10-04 RX ORDER — SOTALOL HYDROCHLORIDE 80 MG/1
80 TABLET ORAL
Qty: 180 | Refills: 3 | Status: ACTIVE | COMMUNITY
Start: 2017-11-02 | End: 1900-01-01

## 2023-11-02 ENCOUNTER — RX RENEWAL (OUTPATIENT)
Age: 73
End: 2023-11-02

## 2023-11-02 RX ORDER — LOSARTAN POTASSIUM 100 MG/1
100 TABLET, FILM COATED ORAL DAILY
Qty: 90 | Refills: 3 | Status: ACTIVE | COMMUNITY
Start: 2017-12-16 | End: 1900-01-01

## 2023-11-12 ENCOUNTER — RX RENEWAL (OUTPATIENT)
Age: 73
End: 2023-11-12

## 2023-11-13 RX ORDER — APIXABAN 5 MG/1
5 TABLET, FILM COATED ORAL
Qty: 180 | Refills: 3 | Status: ACTIVE | COMMUNITY
Start: 2017-11-02 | End: 1900-01-01

## 2023-11-15 ENCOUNTER — APPOINTMENT (OUTPATIENT)
Dept: CARDIOLOGY | Facility: CLINIC | Age: 73
End: 2023-11-15
Payer: MEDICARE

## 2023-11-15 PROCEDURE — 93923 UPR/LXTR ART STDY 3+ LVLS: CPT

## 2023-12-03 NOTE — ED PROVIDER NOTE - EMPLOYMENT
Assumed care of patient at 1900, patient is noted to be lying in bed at this time with no complaints voiced. Able to make needs known, call light in reach.   N/A

## 2023-12-19 ENCOUNTER — APPOINTMENT (OUTPATIENT)
Dept: GYNECOLOGIC ONCOLOGY | Facility: CLINIC | Age: 73
End: 2023-12-19
Payer: MEDICARE

## 2023-12-19 VITALS
HEART RATE: 95 BPM | DIASTOLIC BLOOD PRESSURE: 85 MMHG | BODY MASS INDEX: 35.5 KG/M2 | WEIGHT: 188 LBS | SYSTOLIC BLOOD PRESSURE: 112 MMHG | HEIGHT: 61 IN

## 2023-12-19 DIAGNOSIS — N94.89 OTHER SPECIFIED CONDITIONS ASSOCIATED WITH FEMALE GENITAL ORGANS AND MENSTRUAL CYCLE: ICD-10-CM

## 2023-12-19 PROCEDURE — 99214 OFFICE O/P EST MOD 30 MIN: CPT

## 2023-12-19 NOTE — HISTORY OF PRESENT ILLNESS
[FreeTextEntry1] : 72 year old patient of Dr. Ham,  ( x2). 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 Pathology reported FIGO 1 C3 (positive cytology). She was given 6 cycles of chemotherapy by Dr. Aviles She continues on Eloquis 5mg qd for A-Fib. Treatment was completed in 2021.  is negative (8).   - Followed by Dr. Moran  - S/P Carbo/Taxol x 6 cycles in   Patient last seen in GYN/ONC office on 23 where patient appeared to be doing well with no clinical evidence of disease.  Patient presents today for 6 month follow up.

## 2023-12-19 NOTE — ASSESSMENT
[FreeTextEntry1] : 72 year old patient of Dr. Ham,  ( x2). 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 Pathology reported FIGO 1 C3 (positive cytology). She was given 6 cycles of chemotherapy by Dr. Aviles She continues on Eloquis 5mg qd for A-Fib. Treatment was completed in 2021.  is negative (8). The patient appears to be doing well with no clinical evidence of disease.

## 2024-01-09 ENCOUNTER — APPOINTMENT (OUTPATIENT)
Dept: HEMATOLOGY ONCOLOGY | Facility: CLINIC | Age: 74
End: 2024-01-09
Payer: MEDICARE

## 2024-01-09 ENCOUNTER — LABORATORY RESULT (OUTPATIENT)
Age: 74
End: 2024-01-09

## 2024-01-09 ENCOUNTER — OUTPATIENT (OUTPATIENT)
Dept: OUTPATIENT SERVICES | Facility: HOSPITAL | Age: 74
LOS: 1 days | End: 2024-01-09
Payer: MEDICARE

## 2024-01-09 VITALS
HEIGHT: 61 IN | TEMPERATURE: 97.2 F | WEIGHT: 188 LBS | SYSTOLIC BLOOD PRESSURE: 156 MMHG | HEART RATE: 65 BPM | BODY MASS INDEX: 35.5 KG/M2 | DIASTOLIC BLOOD PRESSURE: 86 MMHG

## 2024-01-09 DIAGNOSIS — Z90.710 ACQUIRED ABSENCE OF BOTH CERVIX AND UTERUS: Chronic | ICD-10-CM

## 2024-01-09 DIAGNOSIS — Z90.49 ACQUIRED ABSENCE OF OTHER SPECIFIED PARTS OF DIGESTIVE TRACT: Chronic | ICD-10-CM

## 2024-01-09 DIAGNOSIS — C56.2 MALIGNANT NEOPLASM OF LEFT OVARY: ICD-10-CM

## 2024-01-09 DIAGNOSIS — Z95.5 PRESENCE OF CORONARY ANGIOPLASTY IMPLANT AND GRAFT: Chronic | ICD-10-CM

## 2024-01-09 LAB
HCT VFR BLD CALC: 35.4 %
HGB BLD-MCNC: 12.3 G/DL
MCHC RBC-ENTMCNC: 32.5 PG
MCHC RBC-ENTMCNC: 34.7 G/DL
MCV RBC AUTO: 93.4 FL
PLATELET # BLD AUTO: 223 K/UL
PMV BLD: 10.3 FL
RBC # BLD: 3.79 M/UL
RBC # FLD: 14.1 %
WBC # FLD AUTO: 3.05 K/UL

## 2024-01-09 PROCEDURE — 99213 OFFICE O/P EST LOW 20 MIN: CPT

## 2024-01-09 PROCEDURE — 80053 COMPREHEN METABOLIC PANEL: CPT

## 2024-01-09 PROCEDURE — 85027 COMPLETE CBC AUTOMATED: CPT

## 2024-01-09 PROCEDURE — 86304 IMMUNOASSAY TUMOR CA 125: CPT

## 2024-01-10 DIAGNOSIS — C56.2 MALIGNANT NEOPLASM OF LEFT OVARY: ICD-10-CM

## 2024-01-10 LAB
ALBUMIN SERPL ELPH-MCNC: 3.9 G/DL
ALP BLD-CCNC: 63 U/L
ALT SERPL-CCNC: 13 U/L
ANION GAP SERPL CALC-SCNC: 10 MMOL/L
AST SERPL-CCNC: 18 U/L
BILIRUB SERPL-MCNC: 2.2 MG/DL
BUN SERPL-MCNC: 15 MG/DL
CALCIUM SERPL-MCNC: 9.4 MG/DL
CANCER AG125 SERPL-ACNC: 10 U/ML
CHLORIDE SERPL-SCNC: 104 MMOL/L
CO2 SERPL-SCNC: 26 MMOL/L
CREAT SERPL-MCNC: 0.8 MG/DL
EGFR: 78 ML/MIN/1.73M2
GLUCOSE SERPL-MCNC: 194 MG/DL
POTASSIUM SERPL-SCNC: 4.6 MMOL/L
PROT SERPL-MCNC: 6.4 G/DL
SODIUM SERPL-SCNC: 140 MMOL/L

## 2024-01-10 NOTE — HISTORY OF PRESENT ILLNESS
[de-identified] : 70 year old female with PMH of CAD, Afib on eliquis, DM, HTN, DLD is here to discuss adjuvant therapy for new diagnosis of ovarian cancer. \par  \par   Patient had episode of chest pain in August for which a CTA was performed. An incidental finding revealed a left adnexal mass. She then saw her OBGYN. A pelvic ultrasound was done, as well as a dedicated CT of Abdomen and Pelvis that showed a 8.3 x 8.8 x 7.4 cm left adnexal mass with solid components. 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 - was 15 prior to surgery. \par  \par  She has recovered well from surgery. She does not have any c/o abdominal pain, weight loss, GOPI. No c/o chest pain/ SOB. \par  She is very active and lives with her family at home. \par  She takes eliquis and ASA 81 for CAD and Afib \par  She was getting B12 inj with PMD for low B12 \par  \par  H/o Colon cancer in 3 maternal aunts \par  Last Mammogram and Colonoscopy- 2-3 months normal \par  Radiology- 08/20- CTA- No CTA evidence of aortic dissection or intramural hematoma. 5.3 cm left adnexal cystic lesion. Recommend ultrasound prior to gadolinium enhanced MRI of the pelvis for further evaluation on an outpatient basis.\par  \par  11/20- MRI pelvis- Complex cystic lesion lt adnexa with hemorrhagic debris. No pelvic or inguinal adenopathy. \par  \par   \par  Pathology: Final Diagnosis\par  1. Left tube and ovary (left pelvic mass):\par  - Left ovary, high grade papillary serous carcinoma.\par  - (The ovarian surface is involved by tumor).\par  - Ovarian fibroma / thecoma\par  - Fallopian tube, paratubal cyst\par  \par  2. Cervix, uterus, right tube and ovary:\par  - Uterine cervix, reactive changes\par  - Endometrium, mainly atrophic\par  - Uterus, leiomyoma\par  - Right ovary, no involvement by carcinoma is seen.\par  - Fallopian tube, paratubal cyst\par  \par  3. Portion of omentum:\par  - Omental adipose tissue, no involvement by carcinoma is seen.\par  \par  4. Left pelvic nodes:\par  - Lymph nodes, no metastatic carcinoma is seen (0/3).\par  \par  \par  PELVIC FLUID:\par  - POSITIVE FOR MALIGNANT CELLS.\par  - Adenocarcinoma.\par  \par  \par  PROCEDURE: Total hysterectomy and bilateral salpingo-\par  oophorectomy, omentum, lymph nodes\par  HYSTERECTOMY TYPE: Laparoscopic\par  SPECIMEN INTEGRITY OF RIGHT OVARY: Capsule intact\par  SPECIMEN INTEGRITY OF LEFT OVARY: Capsule intact\par  SPECIMEN INTEGRITY OF RIGHT FALLOPIAN TUBE: Serosa intact\par  SPECIMEN INTEGRITY OF LEFT FALLOPIAN TUBE: Serosa intact\par  TUMOR SITE: Left ovary\par  OVARIAN SURFACE INVOLVEMENT: Present\par  FALLOPIAN TUBE SURFACE INVOLVEMENT: Absent\par  TUMOR SIZE: Greatest dimension: 3.5 cm (solid /papillary\par  component)\par  Additional dimensions: 2.6 x 1.4 cm\par  HISTOLOGIC TYPE: Serous carcinoma\par  HISTOLOGIC GRADE: WHO GRADING SYSTEM: G3: Poorly differentiated\par  TWO TIER GRADING SYSTEM: High grade\par  IMPLANTS: n/a\par  OTHER TISSUE/ORGAN INVOLVEMENT: Not identified\par  LARGEST EXTRAPELVIC PERITONEAL FOCUS: n/a\par  PERITONEAL / ASCITIC FLUID: Malignant\par  TREATMENT EFFECT: No known presurgical therapy\par  REGIONAL LYMPH NODES: All lymph nodes negative for tumor cells\par  Number of lymph nodes examined: 3\par  PATHOLOGIC STAGE CLASSIFICATION\par  PRIMARY TUMOR: pT1c3: Malignant cells in ascites or\par  peritoneal washings and ovarian surface involvement\par  REGIONAL LYMPH NODES: pN0\par  FIGO STAGE: IC3: Malignant cells present in the ascites or\par  peritoneal washings\par  ADDITIONAL PATHOLOGIC FINDINGS: ovarian fibroma/thecoma, uterine\par  leiomyoma\par  \par  \par  \par  \par   [de-identified] : 1/14/2021 Pt is here to f/u for ovarian cancer, S/P Carbo/Taxol cycle 1 and Zarxio x 5 days. Pt is doing well today, denies N/V/D, no chest pain, no abdominal pain, no urinary symptoms.  Pt reports intermittent mild spotting. Pt had 1 episode of nausea after the chemo and resolved after taking Loperamide, + numbness/tingling of bilateral feet on 1/1/21 with relief from pain meds, + chest discomfort on D3 of Zarxio, went to cardio Dr Teran and EKG was normal. Pt had decreased appetite  during the chemo but had improved this week.   2/8/21 pt is here for follow up. S/p 2 cycles of chemo. She had developed mild neuropathy and was started on Gabapentin. Now on Gabapentin, she has improvement. I discussed possible dose reduction however pt does not want to reduce the dose. No abd pain, N, V, D  3/8/21  Pt presented today for routine f/u and treatment . She reports that her neuropathy has improved with gabapentin but sfter treatment she gets worsening of symptoms for 5 days . She is not sure whether its arthritis or neuropathy .  Denies any abdominal pain nausea or vomiting .  She is s/p 3 cycles , other then neuropathy , tolerating treatment well.   3/29/21  Pt presented today for f/u and to receive treatment . She reports feeling fine . Her neuropathy is not getting worse but she does c/o some tingling and numbness in fingers .  No c/o nausea , vomiting or diarrhea . Tolerating treatment well. She was encouraged to receive covid vaccine .   4/19/21 Pt presented for f/u and treatment today . She is s/p 5 cycles of carbo/ taxol for Stage 1C3 serous ovarian cancer . Tolerating treatment well except tingling and numbness , which she reports comes and goes .  No c/o nausea ., vomiting or diarrhea .  She will receive covid vaccine after finishing treatment . Requires GCSF support with chemo .   6/24/2021 Pt is here to follow up for ovarian cancer She is s/p 6 cycles of carbo/taxol She feels well today, appetite is good She denies abdominal pain, nausea, vomiting or diarrhea She still have mild neuropathy on her fingertips and feet She received COVID vaccine x 2 doses  12/20/21 Pt is here for follow up. Her neuropathy has improved. She has been taking alpha lipoic acid. No abd pain, N, v, d, vag bleeding  4/25/22 Pt is here for follow up. Pt denies abdominal pain or bloating, denies vaginal bleeding or discharge.UTD with mammo, GYN and GI  9/12/22 Pt is here for follow up. Feels somewhat more fatigued. had labs done by Dr Siddharth montiel and found to be anemic with Hgb of 10.6, WBC 3.3 MCV 96, plts 197K No bleeding reported  10/6/22 Patient here for follow up visit to discuss recent anemia work up results. She is feeling well, has no new complaints.  9/14/23 Pt is here for follow up visit. Feels well. UTD with GI and GYN  1/9/24 Patient is here to follow up for ovarian cancer. She feels well, denies abdominal pain, nausea, vomiting or any vaginal bleeding or abnormal vaginal discharge.

## 2024-01-10 NOTE — ASSESSMENT
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 - VINICIUS at this time. - Labs today: CBC, CMP and .  - Mammogram on 2/21/23 was negative. - Follow up with gyn/onc as scheduled.  # NCNC anemia: work up showing only proteinuria, which may be secondary to long history of DM. - Anemia resolved.  - Continue to follow with GI. - Follow up with gyn, PCP and GI for health maintenance as recommended.  All her concerns were discussed during the visit. RTC in 4Mmonths.  Case was seen and discussed with Dr. Aviles who agreed with assessment and plan.

## 2024-01-11 NOTE — ED ADULT NURSE NOTE - NSIMPLEMENTINTERV_GEN_ALL_ED
-Reviewed x-ray findings and urgent care visit 12/2023  -Given continued swelling and discomfort, recommend podiatry evaluation.  Discussed concern for possible stress fracture vs further etiology causing symptoms--may need MRI ankle   Implemented All Universal Safety Interventions:  [place] to call system. Call bell, personal items and telephone within reach. Instruct patient to call for assistance. Room bathroom lighting operational. Non-slip footwear when patient is off stretcher. Physically safe environment: no spills, clutter or unnecessary equipment. Stretcher in lowest position, wheels locked, appropriate side rails in place.

## 2024-01-12 ENCOUNTER — APPOINTMENT (OUTPATIENT)
Dept: CARDIOLOGY | Facility: CLINIC | Age: 74
End: 2024-01-12
Payer: MEDICARE

## 2024-01-12 VITALS
SYSTOLIC BLOOD PRESSURE: 110 MMHG | BODY MASS INDEX: 35.87 KG/M2 | DIASTOLIC BLOOD PRESSURE: 80 MMHG | HEIGHT: 61 IN | WEIGHT: 190 LBS | HEART RATE: 74 BPM

## 2024-01-12 DIAGNOSIS — I25.10 ATHEROSCLEROTIC HEART DISEASE OF NATIVE CORONARY ARTERY W/OUT ANGINA PECTORIS: ICD-10-CM

## 2024-01-12 DIAGNOSIS — E78.5 HYPERLIPIDEMIA, UNSPECIFIED: ICD-10-CM

## 2024-01-12 PROCEDURE — 93000 ELECTROCARDIOGRAM COMPLETE: CPT

## 2024-01-12 PROCEDURE — 99214 OFFICE O/P EST MOD 30 MIN: CPT | Mod: 25

## 2024-01-12 RX ORDER — PANTOPRAZOLE 40 MG/1
40 TABLET, DELAYED RELEASE ORAL DAILY
Refills: 0 | Status: ACTIVE | COMMUNITY

## 2024-01-12 RX ORDER — LIRAGLUTIDE 6 MG/ML
18 INJECTION SUBCUTANEOUS WEEKLY
Refills: 0 | Status: ACTIVE | COMMUNITY

## 2024-01-12 RX ORDER — ASPIRIN 81 MG/1
81 TABLET, COATED ORAL DAILY
Refills: 0 | Status: ACTIVE | COMMUNITY

## 2024-01-12 RX ORDER — DAPAGLIFLOZIN 10 MG/1
10 TABLET, FILM COATED ORAL DAILY
Refills: 0 | Status: ACTIVE | COMMUNITY

## 2024-01-12 RX ORDER — MAGNESIUM OXIDE 241.3 MG/1000MG
400 TABLET ORAL DAILY
Refills: 0 | Status: ACTIVE | COMMUNITY

## 2024-01-12 RX ORDER — LEVOTHYROXINE SODIUM 75 UG/1
75 TABLET ORAL DAILY
Refills: 0 | Status: ACTIVE | COMMUNITY

## 2024-01-12 RX ORDER — METFORMIN HYDROCHLORIDE 1000 MG/1
1000 TABLET, COATED ORAL TWICE DAILY
Refills: 0 | Status: ACTIVE | COMMUNITY

## 2024-01-12 NOTE — HISTORY OF PRESENT ILLNESS
[FreeTextEntry1] : 74 yo F with CAD (s/p mLAD PCI), DM, HTN, HLD, p A fib (on sotalol and eliquis) who presents for f/u.  Since her last visit she has been doing well.  She reports no significant cardiac signs or symptoms. Denies chest pain, shortness of breath, palpitations, lightheadedness, syncope, orthopnea, SLICK or PND.  Labs 10/2023: , HDL 36, LDL 48;  2024- Cr and LFTs WNL  TTE 2023: 1. Normal left ventricular cavity size. The left ventricular wall thickness is normal. The left ventricular systolic function is normal with an ejection fraction of 60 % by Stevenson's method of disks. There are no regional wall motion abnormalities seen. 4. The left atrium is moderately dilated. 5. Mild to moderate mitral regurgitation. 11. Prolapse of both mitral leaflets.  Stress echo 2020 - no ishcemia at 65% MPHR  Cath 2017 - mild mLAD in stent restenosis  3/2022 - carotid doppler - plaque in carotid bulbs

## 2024-03-07 ENCOUNTER — APPOINTMENT (OUTPATIENT)
Dept: CARDIOLOGY | Facility: CLINIC | Age: 74
End: 2024-03-07
Payer: MEDICARE

## 2024-03-07 ENCOUNTER — APPOINTMENT (OUTPATIENT)
Dept: ELECTROPHYSIOLOGY | Facility: CLINIC | Age: 74
End: 2024-03-07
Payer: MEDICARE

## 2024-03-07 VITALS
HEART RATE: 77 BPM | SYSTOLIC BLOOD PRESSURE: 108 MMHG | HEIGHT: 61 IN | BODY MASS INDEX: 35.5 KG/M2 | WEIGHT: 188 LBS | DIASTOLIC BLOOD PRESSURE: 68 MMHG

## 2024-03-07 PROCEDURE — ZZZZZ: CPT

## 2024-03-07 PROCEDURE — 99214 OFFICE O/P EST MOD 30 MIN: CPT | Mod: 25

## 2024-03-07 PROCEDURE — 93000 ELECTROCARDIOGRAM COMPLETE: CPT

## 2024-03-07 NOTE — PHYSICAL EXAM
[Well Developed] : well developed [Well Nourished] : well nourished [No Acute Distress] : no acute distress [Normal Venous Pressure] : normal venous pressure [Normal Conjunctiva] : normal conjunctiva [No Carotid Bruit] : no carotid bruit [Normal S1, S2] : normal S1, S2 [No Rub] : no rub [No Murmur] : no murmur [No Gallop] : no gallop [Clear Lung Fields] : clear lung fields [Good Air Entry] : good air entry [No Respiratory Distress] : no respiratory distress  [Soft] : abdomen soft [Non Tender] : non-tender [No Masses/organomegaly] : no masses/organomegaly [Normal Gait] : normal gait [Normal Bowel Sounds] : normal bowel sounds [No Edema] : no edema [No Clubbing] : no clubbing [No Cyanosis] : no cyanosis [No Varicosities] : no varicosities [No Rash] : no rash [No Skin Lesions] : no skin lesions [Moves all extremities] : moves all extremities [No Focal Deficits] : no focal deficits [Normal Speech] : normal speech [Alert and Oriented] : alert and oriented [Normal memory] : normal memory

## 2024-03-10 NOTE — ASSESSMENT
[FreeTextEntry1] : 73 years old female with pmh of HTN, CAD, S/P PTCA with stent 2002, DM, Symptomatic   PAF dx in 10/2018  on Sotalol 80 mg BID for rhythm control    AF - Will obtain event monitor to evaluate patient's AF burden. Will discuss further options if patient has a significant burden on the event monitor.    -Continue Eliquis 5 mg BID ( CHADS- VASc - 4, CAD, DM, FEMALE, HTN ); no bleeding - Continue Sotalol 80 mg BID     HTN - cont losartan 100 mg qd

## 2024-03-10 NOTE — ADDENDUM
[FreeTextEntry1] : Shazia BRADY assisted in documentation on 03/07/2024   acting as a scribe for Dr. Miles Forman.

## 2024-03-10 NOTE — HISTORY OF PRESENT ILLNESS
[FreeTextEntry1] : 73 years old female with PAF dx in 10/2018 , on Sotalol 80 mg BID and Eliqus 10/31/2018 she returns for a follow up .   Patient comes for routine follow-up. Patient feels well. However, a couple of months after her surgery, patient had one episode of atrial fibrillation that she felt. Patient had no further episodes after that.       EKG (03/07/2024): Sinus rhythm at 77 bpm.  EKG (07/20/2023): Sinus rhythm at 68 bpm.  EKG (12/8/2022) sinus rhythm at 82 BPM. Labs from (9/12/2022) were normal, creatinine 0.6 ECG ( 3/21/2019) - NSR at 81 bpm, QTc 432 ms , non specific TWI  ECG ( 5/14/2018)- NSR at 66 bpm, normal WA, QRS, QTc 444 ms

## 2024-04-12 NOTE — DISCUSSION/SUMMARY
-- DO NOT REPLY / DO NOT REPLY ALL --  -- Message is from Engagement Center Operations (ECO) --    General Patient Message: Patient was seen for annual physical.  She received a bill for bringing up back pain at that visit.  She stated she just brought it up when asked how she was doing, but saw another doctor elsewhere for this.  She doesn't understand why she is being charged for it.  She spoke with billing and they directed her to contact provider.      Patient would like a call.       Caller Information         Type Contact Phone/Fax    04/12/2024 03:18 PM CDT Phone (Incoming) Hermelinda Leiva (Self) [phone] (M)          Alternative phone number: [phone]    Can a detailed message be left? Yes    Message Turnaround:                [Clean] : was clean [Dry] : was dry [Intact] : was intact [Erythema] : was not erythematous [Ecchymosis] : was not ecchymotic [Seroma] : had no seroma [None] : had no drainage [Firm] : soft [Tender] : nontender [Abnormal Bowel Sounds] : normal bowel sounds [Rebound] : no rebound tenderness [Guarding] : no guarding [Doing Well] : is doing well [Diagnosis/Stage ___] : Given this data, a diagnosis of [unfilled] is rendered.

## 2024-04-22 ENCOUNTER — EMERGENCY (EMERGENCY)
Facility: HOSPITAL | Age: 74
LOS: 0 days | Discharge: HOME | End: 2024-04-23
Attending: EMERGENCY MEDICINE
Payer: MEDICARE

## 2024-04-22 VITALS
OXYGEN SATURATION: 98 % | SYSTOLIC BLOOD PRESSURE: 153 MMHG | RESPIRATION RATE: 16 BRPM | DIASTOLIC BLOOD PRESSURE: 71 MMHG | HEART RATE: 77 BPM | WEIGHT: 184.09 LBS

## 2024-04-22 DIAGNOSIS — Z91.041 RADIOGRAPHIC DYE ALLERGY STATUS: ICD-10-CM

## 2024-04-22 DIAGNOSIS — E78.5 HYPERLIPIDEMIA, UNSPECIFIED: ICD-10-CM

## 2024-04-22 DIAGNOSIS — I48.91 UNSPECIFIED ATRIAL FIBRILLATION: ICD-10-CM

## 2024-04-22 DIAGNOSIS — R07.89 OTHER CHEST PAIN: ICD-10-CM

## 2024-04-22 DIAGNOSIS — Z95.5 PRESENCE OF CORONARY ANGIOPLASTY IMPLANT AND GRAFT: ICD-10-CM

## 2024-04-22 DIAGNOSIS — R51.9 HEADACHE, UNSPECIFIED: ICD-10-CM

## 2024-04-22 DIAGNOSIS — K21.9 GASTRO-ESOPHAGEAL REFLUX DISEASE WITHOUT ESOPHAGITIS: ICD-10-CM

## 2024-04-22 DIAGNOSIS — Z90.49 ACQUIRED ABSENCE OF OTHER SPECIFIED PARTS OF DIGESTIVE TRACT: Chronic | ICD-10-CM

## 2024-04-22 DIAGNOSIS — Z90.710 ACQUIRED ABSENCE OF BOTH CERVIX AND UTERUS: Chronic | ICD-10-CM

## 2024-04-22 DIAGNOSIS — R11.2 NAUSEA WITH VOMITING, UNSPECIFIED: ICD-10-CM

## 2024-04-22 DIAGNOSIS — Z79.01 LONG TERM (CURRENT) USE OF ANTICOAGULANTS: ICD-10-CM

## 2024-04-22 DIAGNOSIS — I10 ESSENTIAL (PRIMARY) HYPERTENSION: ICD-10-CM

## 2024-04-22 DIAGNOSIS — I25.10 ATHEROSCLEROTIC HEART DISEASE OF NATIVE CORONARY ARTERY WITHOUT ANGINA PECTORIS: ICD-10-CM

## 2024-04-22 DIAGNOSIS — Z95.5 PRESENCE OF CORONARY ANGIOPLASTY IMPLANT AND GRAFT: Chronic | ICD-10-CM

## 2024-04-22 DIAGNOSIS — R53.1 WEAKNESS: ICD-10-CM

## 2024-04-22 LAB
ALBUMIN SERPL ELPH-MCNC: 4.2 G/DL — SIGNIFICANT CHANGE UP (ref 3.5–5.2)
ALP SERPL-CCNC: 59 U/L — SIGNIFICANT CHANGE UP (ref 30–115)
ALT FLD-CCNC: 31 U/L — SIGNIFICANT CHANGE UP (ref 0–41)
ANION GAP SERPL CALC-SCNC: 10 MMOL/L — SIGNIFICANT CHANGE UP (ref 7–14)
AST SERPL-CCNC: 27 U/L — SIGNIFICANT CHANGE UP (ref 0–41)
B-OH-BUTYR SERPL-SCNC: <0.2 MMOL/L — SIGNIFICANT CHANGE UP
BASOPHILS # BLD AUTO: 0.04 K/UL — SIGNIFICANT CHANGE UP (ref 0–0.2)
BASOPHILS NFR BLD AUTO: 0.7 % — SIGNIFICANT CHANGE UP (ref 0–1)
BILIRUB SERPL-MCNC: 3.1 MG/DL — HIGH (ref 0.2–1.2)
BUN SERPL-MCNC: 19 MG/DL — SIGNIFICANT CHANGE UP (ref 10–20)
CALCIUM SERPL-MCNC: 9.5 MG/DL — SIGNIFICANT CHANGE UP (ref 8.4–10.5)
CHLORIDE SERPL-SCNC: 101 MMOL/L — SIGNIFICANT CHANGE UP (ref 98–110)
CO2 SERPL-SCNC: 27 MMOL/L — SIGNIFICANT CHANGE UP (ref 17–32)
CREAT SERPL-MCNC: 0.8 MG/DL — SIGNIFICANT CHANGE UP (ref 0.7–1.5)
EGFR: 77 ML/MIN/1.73M2 — SIGNIFICANT CHANGE UP
EOSINOPHIL # BLD AUTO: 0.05 K/UL — SIGNIFICANT CHANGE UP (ref 0–0.7)
EOSINOPHIL NFR BLD AUTO: 0.9 % — SIGNIFICANT CHANGE UP (ref 0–8)
GAS PNL BLDV: SIGNIFICANT CHANGE UP
GLUCOSE SERPL-MCNC: 147 MG/DL — HIGH (ref 70–99)
HCT VFR BLD CALC: 39.2 % — SIGNIFICANT CHANGE UP (ref 37–47)
HGB BLD-MCNC: 13.4 G/DL — SIGNIFICANT CHANGE UP (ref 12–16)
IMM GRANULOCYTES NFR BLD AUTO: 0.4 % — HIGH (ref 0.1–0.3)
LIDOCAIN IGE QN: 42 U/L — SIGNIFICANT CHANGE UP (ref 7–60)
LYMPHOCYTES # BLD AUTO: 1.55 K/UL — SIGNIFICANT CHANGE UP (ref 1.2–3.4)
LYMPHOCYTES # BLD AUTO: 27.4 % — SIGNIFICANT CHANGE UP (ref 20.5–51.1)
MAGNESIUM SERPL-MCNC: 2 MG/DL — SIGNIFICANT CHANGE UP (ref 1.8–2.4)
MCHC RBC-ENTMCNC: 32.4 PG — HIGH (ref 27–31)
MCHC RBC-ENTMCNC: 34.2 G/DL — SIGNIFICANT CHANGE UP (ref 32–37)
MCV RBC AUTO: 94.7 FL — SIGNIFICANT CHANGE UP (ref 81–99)
MONOCYTES # BLD AUTO: 0.5 K/UL — SIGNIFICANT CHANGE UP (ref 0.1–0.6)
MONOCYTES NFR BLD AUTO: 8.8 % — SIGNIFICANT CHANGE UP (ref 1.7–9.3)
NEUTROPHILS # BLD AUTO: 3.5 K/UL — SIGNIFICANT CHANGE UP (ref 1.4–6.5)
NEUTROPHILS NFR BLD AUTO: 61.8 % — SIGNIFICANT CHANGE UP (ref 42.2–75.2)
NRBC # BLD: 0 /100 WBCS — SIGNIFICANT CHANGE UP (ref 0–0)
PLATELET # BLD AUTO: 277 K/UL — SIGNIFICANT CHANGE UP (ref 130–400)
PMV BLD: 10.2 FL — SIGNIFICANT CHANGE UP (ref 7.4–10.4)
POTASSIUM SERPL-MCNC: 4.1 MMOL/L — SIGNIFICANT CHANGE UP (ref 3.5–5)
POTASSIUM SERPL-SCNC: 4.1 MMOL/L — SIGNIFICANT CHANGE UP (ref 3.5–5)
PROT SERPL-MCNC: 7 G/DL — SIGNIFICANT CHANGE UP (ref 6–8)
RBC # BLD: 4.14 M/UL — LOW (ref 4.2–5.4)
RBC # FLD: 14.5 % — SIGNIFICANT CHANGE UP (ref 11.5–14.5)
SODIUM SERPL-SCNC: 138 MMOL/L — SIGNIFICANT CHANGE UP (ref 135–146)
TROPONIN T, HIGH SENSITIVITY RESULT: 14 NG/L — HIGH (ref 6–13)
TROPONIN T, HIGH SENSITIVITY RESULT: 14 NG/L — HIGH (ref 6–13)
WBC # BLD: 5.66 K/UL — SIGNIFICANT CHANGE UP (ref 4.8–10.8)
WBC # FLD AUTO: 5.66 K/UL — SIGNIFICANT CHANGE UP (ref 4.8–10.8)

## 2024-04-22 PROCEDURE — 99285 EMERGENCY DEPT VISIT HI MDM: CPT | Mod: 25

## 2024-04-22 PROCEDURE — 36415 COLL VENOUS BLD VENIPUNCTURE: CPT

## 2024-04-22 PROCEDURE — 84132 ASSAY OF SERUM POTASSIUM: CPT

## 2024-04-22 PROCEDURE — 83735 ASSAY OF MAGNESIUM: CPT

## 2024-04-22 PROCEDURE — 85018 HEMOGLOBIN: CPT

## 2024-04-22 PROCEDURE — 96375 TX/PRO/DX INJ NEW DRUG ADDON: CPT

## 2024-04-22 PROCEDURE — 96361 HYDRATE IV INFUSION ADD-ON: CPT

## 2024-04-22 PROCEDURE — 96374 THER/PROPH/DIAG INJ IV PUSH: CPT

## 2024-04-22 PROCEDURE — 82962 GLUCOSE BLOOD TEST: CPT

## 2024-04-22 PROCEDURE — G0378: CPT

## 2024-04-22 PROCEDURE — 78452 HT MUSCLE IMAGE SPECT MULT: CPT | Mod: MC

## 2024-04-22 PROCEDURE — A9500: CPT

## 2024-04-22 PROCEDURE — 85025 COMPLETE CBC W/AUTO DIFF WBC: CPT

## 2024-04-22 PROCEDURE — 85014 HEMATOCRIT: CPT

## 2024-04-22 PROCEDURE — 93017 CV STRESS TEST TRACING ONLY: CPT

## 2024-04-22 PROCEDURE — 93005 ELECTROCARDIOGRAM TRACING: CPT

## 2024-04-22 PROCEDURE — 82803 BLOOD GASES ANY COMBINATION: CPT

## 2024-04-22 PROCEDURE — 83605 ASSAY OF LACTIC ACID: CPT

## 2024-04-22 PROCEDURE — 93010 ELECTROCARDIOGRAM REPORT: CPT

## 2024-04-22 PROCEDURE — 80053 COMPREHEN METABOLIC PANEL: CPT

## 2024-04-22 PROCEDURE — 84295 ASSAY OF SERUM SODIUM: CPT

## 2024-04-22 PROCEDURE — 71045 X-RAY EXAM CHEST 1 VIEW: CPT

## 2024-04-22 PROCEDURE — 83690 ASSAY OF LIPASE: CPT

## 2024-04-22 PROCEDURE — 74176 CT ABD & PELVIS W/O CONTRAST: CPT | Mod: 26,MC

## 2024-04-22 PROCEDURE — 99223 1ST HOSP IP/OBS HIGH 75: CPT

## 2024-04-22 PROCEDURE — 71045 X-RAY EXAM CHEST 1 VIEW: CPT | Mod: 26

## 2024-04-22 PROCEDURE — 82330 ASSAY OF CALCIUM: CPT

## 2024-04-22 PROCEDURE — 84484 ASSAY OF TROPONIN QUANT: CPT

## 2024-04-22 PROCEDURE — 74176 CT ABD & PELVIS W/O CONTRAST: CPT | Mod: MC

## 2024-04-22 PROCEDURE — 82010 KETONE BODYS QUAN: CPT

## 2024-04-22 RX ORDER — SOTALOL HCL 120 MG
80 TABLET ORAL EVERY 12 HOURS
Refills: 0 | Status: DISCONTINUED | OUTPATIENT
Start: 2024-04-22 | End: 2024-04-23

## 2024-04-22 RX ORDER — ATORVASTATIN CALCIUM 80 MG/1
80 TABLET, FILM COATED ORAL AT BEDTIME
Refills: 0 | Status: DISCONTINUED | OUTPATIENT
Start: 2024-04-22 | End: 2024-04-23

## 2024-04-22 RX ORDER — ONDANSETRON 8 MG/1
4 TABLET, FILM COATED ORAL ONCE
Refills: 0 | Status: COMPLETED | OUTPATIENT
Start: 2024-04-22 | End: 2024-04-22

## 2024-04-22 RX ORDER — METFORMIN HYDROCHLORIDE 850 MG/1
1000 TABLET ORAL EVERY 12 HOURS
Refills: 0 | Status: DISCONTINUED | OUTPATIENT
Start: 2024-04-22 | End: 2024-04-23

## 2024-04-22 RX ORDER — SODIUM CHLORIDE 9 MG/ML
1000 INJECTION INTRAMUSCULAR; INTRAVENOUS; SUBCUTANEOUS ONCE
Refills: 0 | Status: COMPLETED | OUTPATIENT
Start: 2024-04-22 | End: 2024-04-22

## 2024-04-22 RX ORDER — FAMOTIDINE 10 MG/ML
20 INJECTION INTRAVENOUS ONCE
Refills: 0 | Status: COMPLETED | OUTPATIENT
Start: 2024-04-22 | End: 2024-04-22

## 2024-04-22 RX ORDER — AMLODIPINE BESYLATE 2.5 MG/1
2.5 TABLET ORAL AT BEDTIME
Refills: 0 | Status: DISCONTINUED | OUTPATIENT
Start: 2024-04-22 | End: 2024-04-23

## 2024-04-22 RX ORDER — APIXABAN 2.5 MG/1
5 TABLET, FILM COATED ORAL EVERY 12 HOURS
Refills: 0 | Status: DISCONTINUED | OUTPATIENT
Start: 2024-04-22 | End: 2024-04-23

## 2024-04-22 RX ADMIN — FAMOTIDINE 20 MILLIGRAM(S): 10 INJECTION INTRAVENOUS at 18:32

## 2024-04-22 RX ADMIN — SODIUM CHLORIDE 1000 MILLILITER(S): 9 INJECTION INTRAMUSCULAR; INTRAVENOUS; SUBCUTANEOUS at 21:23

## 2024-04-22 RX ADMIN — ATORVASTATIN CALCIUM 80 MILLIGRAM(S): 80 TABLET, FILM COATED ORAL at 23:01

## 2024-04-22 RX ADMIN — SODIUM CHLORIDE 1000 MILLILITER(S): 9 INJECTION INTRAMUSCULAR; INTRAVENOUS; SUBCUTANEOUS at 18:31

## 2024-04-22 RX ADMIN — ONDANSETRON 4 MILLIGRAM(S): 8 TABLET, FILM COATED ORAL at 18:32

## 2024-04-22 RX ADMIN — APIXABAN 5 MILLIGRAM(S): 2.5 TABLET, FILM COATED ORAL at 23:03

## 2024-04-22 NOTE — ED ADULT NURSE NOTE - SUICIDE SCREENING QUESTION 2
----- Message from May Dejesus RN sent at 1/22/2020 11:10 AM CST -----    ----- Message -----  From: Brett French MD  Sent: 1/22/2020  10:39 AM CST  To: [facility] Nurse Msg Pool    Biopsies are ok  If sx persist, check GES and f/u in the office afterwards.       No

## 2024-04-22 NOTE — ED PROVIDER NOTE - OBJECTIVE STATEMENT
74-year-old female with a past medical history of A-fib on Eliquis, CAD with 2 stents, GERD, hyperlipidemia, HTN presents the ED with nausea and vomiting.  Yesterday at 1:15 PM patient started her colonoscopy prep with GoLytely for a colonoscopy that was planned today with Dr. Villatoro.  At 6:30 PM she developed nausea, vomiting, dry heaving.  This time she also experienced sharp midsternal chest pain and headache.  Chest pain is since resolved however she remains with generalized weakness, nausea and epigastric abdominal pain.  Denies fever, chills, lightheadedness, palpitations, shortness of breath, diarrhea, dysuria, hematuria, lower extremity swelling.

## 2024-04-22 NOTE — ED ADULT NURSE REASSESSMENT NOTE - NS ED NURSE REASSESS COMMENT FT1
Assumed care from day shift nurse Ramos c/o nausea , vomiting, chest pain , headache , abdominal pain , generalized  weakness after drinking colonoscopy prep golytely for a planned colonoscopy. Pt AO x 4 , denies chest pain this time , no vomiting noted , denies nausea , abdominal pain , headache, palpitations , dizziness this time , no SOB noted , fall alarm on , on continuos cardiac monitor , IVL intact.

## 2024-04-22 NOTE — ED PROVIDER NOTE - CLINICAL SUMMARY MEDICAL DECISION MAKING FREE TEXT BOX
74-year-old presented today with vomiting, chest pain.  Patient is hemodynamically stable and well-appearing on evaluation.  Patient had troponins that are outside the upper reference limit of normal.  Given patient having this vomiting and epigastric pain and chest pain that been occurring at once and previous cardiac history decision was made to place into observation for cardiac testing including a stress test.    EKG interpretation:  NSR, no ST elevations, no t wave inversions as interpreted by Henry Ott DO    Independent interpretation of the Xray film(s) performed by: Henry Ott.     Interpretation: Chest XR negative - no acute infiltrate, no pneumothorax

## 2024-04-22 NOTE — ED CDU PROVIDER INITIAL DAY NOTE - CLINICAL SUMMARY MEDICAL DECISION MAKING FREE TEXT BOX
75yo woman h/o afib on eliquis, CAD s/p 2 stents, GERD, HLD, HTN was placed in CDU for evaluation of epigastric pain that began after she drank golytely in preparation for a colonoscopy. Initial evaluation with labs and imaging unremarkable, trops reassuring. On my reassessment pt feeling much better and well appearing. Lungs CTA, CVS1S2 RRR. Abd soft. NT. Plan is for nuclear stress test and likely d/c if negative.

## 2024-04-22 NOTE — ED ADULT NURSE NOTE - PRIMARY CARE PROVIDER
Refilled for 15 tabs only. She has an aptmt with dr Eddy on may 1st. Future refills per her new pcp--per Dr Alcaraz.  Patient notified   pcp

## 2024-04-23 VITALS
DIASTOLIC BLOOD PRESSURE: 66 MMHG | SYSTOLIC BLOOD PRESSURE: 147 MMHG | OXYGEN SATURATION: 99 % | HEART RATE: 62 BPM | TEMPERATURE: 98 F | RESPIRATION RATE: 18 BRPM

## 2024-04-23 LAB — GLUCOSE BLDC GLUCOMTR-MCNC: 170 MG/DL — HIGH (ref 70–99)

## 2024-04-23 PROCEDURE — 78452 HT MUSCLE IMAGE SPECT MULT: CPT | Mod: 26,MC

## 2024-04-23 PROCEDURE — 93018 CV STRESS TEST I&R ONLY: CPT

## 2024-04-23 PROCEDURE — 93016 CV STRESS TEST SUPVJ ONLY: CPT

## 2024-04-23 PROCEDURE — 99239 HOSP IP/OBS DSCHRG MGMT >30: CPT

## 2024-04-23 RX ORDER — REGADENOSON 0.08 MG/ML
0.4 INJECTION, SOLUTION INTRAVENOUS ONCE
Refills: 0 | Status: DISCONTINUED | OUTPATIENT
Start: 2024-04-23 | End: 2024-04-23

## 2024-04-23 RX ADMIN — Medication 80 MILLIGRAM(S): at 12:18

## 2024-04-23 RX ADMIN — Medication 80 MILLIGRAM(S): at 00:00

## 2024-04-23 RX ADMIN — APIXABAN 5 MILLIGRAM(S): 2.5 TABLET, FILM COATED ORAL at 12:18

## 2024-04-23 RX ADMIN — METFORMIN HYDROCHLORIDE 1000 MILLIGRAM(S): 850 TABLET ORAL at 05:27

## 2024-04-23 NOTE — ED CDU PROVIDER DISPOSITION NOTE - PATIENT PORTAL LINK FT
You can access the FollowMyHealth Patient Portal offered by [facility] by registering at the following website: [email or web address]. By joining [employer]’s FollowMyHealth portal, you will also be able to view your health information using other applications (apps) compatible with our system.

## 2024-04-23 NOTE — ED CDU PROVIDER DISPOSITION NOTE - CLINICAL COURSE
75yo woman h/o afib on eliquis, CAD s/p 2 stents, GERD, HLD, HTN was placed in CDU for evaluation of epigastric pain that began after she drank golytely in preparation for a colonoscopy. Initial evaluation with labs and imaging unremarkable, trops reassuring. On my reassessment pt feeling much better and well appearing. Lungs CTA, CVS1S2 RRR. Abd soft, NT. Pt was observed without incident; nuclear stress test was negative for ischemia. Results d/w pt and she is comfortable with discharge to f/u PMD/cardiology.

## 2024-04-23 NOTE — ED ADULT NURSE REASSESSMENT NOTE - NS ED NURSE REASSESS COMMENT FT1
Report received from RN. Pt assessed. Pt A&Ox4. Pt pending NST at this time, updated on plan of care. Will update as needed.

## 2024-05-16 ENCOUNTER — APPOINTMENT (OUTPATIENT)
Dept: ELECTROPHYSIOLOGY | Facility: CLINIC | Age: 74
End: 2024-05-16
Payer: MEDICARE

## 2024-05-16 VITALS
HEART RATE: 68 BPM | HEIGHT: 61 IN | DIASTOLIC BLOOD PRESSURE: 70 MMHG | BODY MASS INDEX: 35.12 KG/M2 | WEIGHT: 186 LBS | SYSTOLIC BLOOD PRESSURE: 125 MMHG

## 2024-05-16 DIAGNOSIS — Z90.710 ACQUIRED ABSENCE OF BOTH CERVIX AND UTERUS: ICD-10-CM

## 2024-05-16 DIAGNOSIS — I48.0 PAROXYSMAL ATRIAL FIBRILLATION: ICD-10-CM

## 2024-05-16 DIAGNOSIS — Z08 ENCOUNTER FOR FOLLOW-UP EXAMINATION AFTER COMPLETED TREATMENT FOR MALIGNANT NEOPLASM: ICD-10-CM

## 2024-05-16 DIAGNOSIS — Z86.79 PERSONAL HISTORY OF OTHER DISEASES OF THE CIRCULATORY SYSTEM: ICD-10-CM

## 2024-05-16 DIAGNOSIS — R00.1 BRADYCARDIA, UNSPECIFIED: ICD-10-CM

## 2024-05-16 DIAGNOSIS — Z92.21 PERSONAL HISTORY OF ANTINEOPLASTIC CHEMOTHERAPY: ICD-10-CM

## 2024-05-16 DIAGNOSIS — Z85.43 PERSONAL HISTORY OF MALIGNANT NEOPLASM OF OVARY: ICD-10-CM

## 2024-05-16 DIAGNOSIS — Z90.721 ACQUIRED ABSENCE OF BOTH CERVIX AND UTERUS: ICD-10-CM

## 2024-05-16 DIAGNOSIS — Z51.11 ENCOUNTER FOR ANTINEOPLASTIC CHEMOTHERAPY: ICD-10-CM

## 2024-05-16 DIAGNOSIS — Z85.43 ENCOUNTER FOR FOLLOW-UP EXAMINATION AFTER COMPLETED TREATMENT FOR MALIGNANT NEOPLASM: ICD-10-CM

## 2024-05-16 PROCEDURE — 93000 ELECTROCARDIOGRAM COMPLETE: CPT

## 2024-05-16 PROCEDURE — 99214 OFFICE O/P EST MOD 30 MIN: CPT

## 2024-05-16 PROCEDURE — G2211 COMPLEX E/M VISIT ADD ON: CPT

## 2024-06-10 NOTE — REVIEW OF SYSTEMS
HMS received consult for insulin management in antepartum patient.    HMS able to assist with the following aspects of DM management in antepartum patient:  -Fingerstick BG monitoring orders  -determine whether BG are at goal or not  -determine whether basal insulin is indicated, and if so, verify it is ordered  -rule out DKA    HMS do not dose insulin in antepartum patients. From our standpoint, this is deferred to OB + MFM.    When I went to visit with the patient, FM resident arrived to complete a consult for BG management. Appears [facility] involvement no longer requested. [facility] will sign off.    Radha Rivera MD  [facility]   [Negative] : Endocrine

## 2024-06-18 ENCOUNTER — APPOINTMENT (OUTPATIENT)
Dept: GYNECOLOGIC ONCOLOGY | Facility: CLINIC | Age: 74
End: 2024-06-18
Payer: MEDICARE

## 2024-06-18 VITALS
SYSTOLIC BLOOD PRESSURE: 114 MMHG | HEIGHT: 61 IN | DIASTOLIC BLOOD PRESSURE: 74 MMHG | WEIGHT: 183 LBS | HEART RATE: 87 BPM | BODY MASS INDEX: 34.55 KG/M2

## 2024-06-18 DIAGNOSIS — N95.2 POSTMENOPAUSAL ATROPHIC VAGINITIS: ICD-10-CM

## 2024-06-18 DIAGNOSIS — C56.2 MALIGNANT NEOPLASM OF LEFT OVARY: ICD-10-CM

## 2024-06-18 PROCEDURE — 99213 OFFICE O/P EST LOW 20 MIN: CPT

## 2024-06-18 NOTE — PHYSICAL EXAM
[Chaperone Present] : A chaperone was present in the examining room during all aspects of the physical examination [[id number]] : A chaperone was present during the pelvic exam. [FreeTextEntry2] : Gela Florez [TextEntry] : Well - developed, well-nourished female in no acute distress HEENT - wnl Breasts - symmetrical w/o masses or nipple discharge Abdomen - soft, non-tender, +BS Incisions - well healed Back - no CVA tenderness or spinal tenderness Extremities - w/o clubbing, cyanosis, no lesions noted Neuro - AAOx3  Pelvic Exam External Genitalia - with out lesions Vagina - mucosa atrophic, cuff intact and well supported. Cervix - surgically absent Uterus - surgically absent Adnexa - surgically absent Rectovaginal - confirmatory

## 2024-06-29 PROBLEM — R00.1 BRADYCARDIA: Status: ACTIVE | Noted: 2020-08-11

## 2024-06-29 NOTE — HISTORY OF PRESENT ILLNESS
[FreeTextEntry1] : 74 years old female with PAF dx in 10/2018 , on Sotalol 80 mg BID and Eliquis 10/31/2018 she returns for a follow up .   Patient comes for routine follow-up. Patient feels well. However, a couple of months after her surgery, patient had one episode of atrial fibrillation that she felt. Patient had no further episodes after that.        05/16/2024: Patient comes to the office today for routine follow-up. Patient denies syncope, lightheadedness, or dizziness. Event monitor showed no AF. On EKG today, patient had a blocked APC with a pause.    EKG (05/16/2024): Sinus rhythm at 68 bpm, blocked PAC.  Nuclear stress test (04/2024): No ischemia, EF of 68%. Event monitor (03/2024): No AF. Average HR 70 bpm, range between  bpm. Few beats of axillary junctional rhythm during sleep.  EKG (03/07/2024): Sinus rhythm at 77 bpm.  EKG (07/20/2023): Sinus rhythm at 68 bpm.  EKG (12/8/2022) sinus rhythm at 82 BPM. Labs from (9/12/2022) were normal, creatinine 0.6 ECG ( 3/21/2019) - NSR at 81 bpm, QTc 432 ms , non specific TWI  ECG ( 5/14/2018)- NSR at 66 bpm, normal WI, QRS, QTc 444 ms

## 2024-06-29 NOTE — ADDENDUM
[FreeTextEntry1] : Shazia BRADY assisted in documentation on 05/16/2024   acting as a scribe for Dr. Miles Forman.

## 2024-06-29 NOTE — ASSESSMENT
[FreeTextEntry1] : 73 years old female with pmh of HTN, CAD, S/P PTCA with stent 2002, DM, Symptomatic   PAF dx in 10/2018  on Sotalol 80 mg BID for rhythm control    AF - Patient had no further episodes. Will continue to monitor.  -Continue Eliquis 5 mg BID ( CHADS- VASc - 4, CAD, DM, FEMALE, HTN ); no bleeding - Continue Sotalol 80 mg BID    HTN - Continue losartan 100 mg qd   Bradycardia - Patient found to have blocked APC with first-degree AV block on EKG.  - Discussed with patient the possibility of long-term monitoring with loop recorder. Patient would like to think about it. She will return to the office for further discussion at next visit.

## 2024-07-11 ENCOUNTER — APPOINTMENT (OUTPATIENT)
Dept: HEMATOLOGY ONCOLOGY | Facility: CLINIC | Age: 74
End: 2024-07-11

## 2024-07-12 ENCOUNTER — APPOINTMENT (OUTPATIENT)
Dept: CARDIOLOGY | Facility: CLINIC | Age: 74
End: 2024-07-12

## 2024-07-18 ENCOUNTER — APPOINTMENT (OUTPATIENT)
Dept: CARDIOLOGY | Facility: CLINIC | Age: 74
End: 2024-07-18
Payer: MEDICARE

## 2024-07-18 VITALS
SYSTOLIC BLOOD PRESSURE: 110 MMHG | WEIGHT: 183 LBS | DIASTOLIC BLOOD PRESSURE: 70 MMHG | HEART RATE: 77 BPM | HEIGHT: 61 IN | BODY MASS INDEX: 34.55 KG/M2

## 2024-07-18 DIAGNOSIS — I25.10 ATHEROSCLEROTIC HEART DISEASE OF NATIVE CORONARY ARTERY W/OUT ANGINA PECTORIS: ICD-10-CM

## 2024-07-18 DIAGNOSIS — I48.0 PAROXYSMAL ATRIAL FIBRILLATION: ICD-10-CM

## 2024-07-18 DIAGNOSIS — Z86.79 PERSONAL HISTORY OF OTHER DISEASES OF THE CIRCULATORY SYSTEM: ICD-10-CM

## 2024-07-18 DIAGNOSIS — E11.9 TYPE 2 DIABETES MELLITUS W/OUT COMPLICATIONS: ICD-10-CM

## 2024-07-18 DIAGNOSIS — M79.89 OTHER SPECIFIED SOFT TISSUE DISORDERS: ICD-10-CM

## 2024-07-18 DIAGNOSIS — E78.5 HYPERLIPIDEMIA, UNSPECIFIED: ICD-10-CM

## 2024-07-18 PROCEDURE — 93000 ELECTROCARDIOGRAM COMPLETE: CPT

## 2024-07-18 PROCEDURE — 99214 OFFICE O/P EST MOD 30 MIN: CPT | Mod: 25

## 2024-07-20 ENCOUNTER — EMERGENCY (EMERGENCY)
Facility: HOSPITAL | Age: 74
LOS: 0 days | Discharge: HOME | End: 2024-07-20
Attending: EMERGENCY MEDICINE
Payer: MEDICARE

## 2024-07-20 VITALS
TEMPERATURE: 98 F | SYSTOLIC BLOOD PRESSURE: 152 MMHG | OXYGEN SATURATION: 99 % | HEART RATE: 77 BPM | DIASTOLIC BLOOD PRESSURE: 71 MMHG | RESPIRATION RATE: 18 BRPM

## 2024-07-20 VITALS
HEIGHT: 62 IN | DIASTOLIC BLOOD PRESSURE: 87 MMHG | OXYGEN SATURATION: 98 % | TEMPERATURE: 98 F | SYSTOLIC BLOOD PRESSURE: 142 MMHG | RESPIRATION RATE: 18 BRPM | HEART RATE: 81 BPM | WEIGHT: 184.09 LBS

## 2024-07-20 DIAGNOSIS — I10 ESSENTIAL (PRIMARY) HYPERTENSION: ICD-10-CM

## 2024-07-20 DIAGNOSIS — E78.5 HYPERLIPIDEMIA, UNSPECIFIED: ICD-10-CM

## 2024-07-20 DIAGNOSIS — R07.89 OTHER CHEST PAIN: ICD-10-CM

## 2024-07-20 DIAGNOSIS — I48.91 UNSPECIFIED ATRIAL FIBRILLATION: ICD-10-CM

## 2024-07-20 DIAGNOSIS — Z90.49 ACQUIRED ABSENCE OF OTHER SPECIFIED PARTS OF DIGESTIVE TRACT: Chronic | ICD-10-CM

## 2024-07-20 DIAGNOSIS — R10.13 EPIGASTRIC PAIN: ICD-10-CM

## 2024-07-20 DIAGNOSIS — I44.0 ATRIOVENTRICULAR BLOCK, FIRST DEGREE: ICD-10-CM

## 2024-07-20 DIAGNOSIS — Z95.5 PRESENCE OF CORONARY ANGIOPLASTY IMPLANT AND GRAFT: ICD-10-CM

## 2024-07-20 DIAGNOSIS — Z91.041 RADIOGRAPHIC DYE ALLERGY STATUS: ICD-10-CM

## 2024-07-20 DIAGNOSIS — I25.10 ATHEROSCLEROTIC HEART DISEASE OF NATIVE CORONARY ARTERY WITHOUT ANGINA PECTORIS: ICD-10-CM

## 2024-07-20 DIAGNOSIS — Z90.710 ACQUIRED ABSENCE OF BOTH CERVIX AND UTERUS: Chronic | ICD-10-CM

## 2024-07-20 DIAGNOSIS — Z79.01 LONG TERM (CURRENT) USE OF ANTICOAGULANTS: ICD-10-CM

## 2024-07-20 DIAGNOSIS — Z95.5 PRESENCE OF CORONARY ANGIOPLASTY IMPLANT AND GRAFT: Chronic | ICD-10-CM

## 2024-07-20 DIAGNOSIS — K21.9 GASTRO-ESOPHAGEAL REFLUX DISEASE WITHOUT ESOPHAGITIS: ICD-10-CM

## 2024-07-20 LAB
ALBUMIN SERPL ELPH-MCNC: 4 G/DL — SIGNIFICANT CHANGE UP (ref 3.5–5.2)
ALP SERPL-CCNC: 49 U/L — SIGNIFICANT CHANGE UP (ref 30–115)
ALT FLD-CCNC: 22 U/L — SIGNIFICANT CHANGE UP (ref 0–41)
ANION GAP SERPL CALC-SCNC: 13 MMOL/L — SIGNIFICANT CHANGE UP (ref 7–14)
AST SERPL-CCNC: 104 U/L — HIGH (ref 0–41)
BASOPHILS # BLD AUTO: 0.04 K/UL — SIGNIFICANT CHANGE UP (ref 0–0.2)
BASOPHILS NFR BLD AUTO: 0.8 % — SIGNIFICANT CHANGE UP (ref 0–1)
BILIRUB SERPL-MCNC: 2.1 MG/DL — HIGH (ref 0.2–1.2)
BUN SERPL-MCNC: 22 MG/DL — HIGH (ref 10–20)
CALCIUM SERPL-MCNC: 9.2 MG/DL — SIGNIFICANT CHANGE UP (ref 8.4–10.4)
CHLORIDE SERPL-SCNC: 103 MMOL/L — SIGNIFICANT CHANGE UP (ref 98–110)
CO2 SERPL-SCNC: 21 MMOL/L — SIGNIFICANT CHANGE UP (ref 17–32)
CREAT SERPL-MCNC: 0.9 MG/DL — SIGNIFICANT CHANGE UP (ref 0.7–1.5)
EGFR: 67 ML/MIN/1.73M2 — SIGNIFICANT CHANGE UP
EOSINOPHIL # BLD AUTO: 0.16 K/UL — SIGNIFICANT CHANGE UP (ref 0–0.7)
EOSINOPHIL NFR BLD AUTO: 3 % — SIGNIFICANT CHANGE UP (ref 0–8)
GLUCOSE SERPL-MCNC: 118 MG/DL — HIGH (ref 70–99)
HCT VFR BLD CALC: 37.3 % — SIGNIFICANT CHANGE UP (ref 37–47)
HGB BLD-MCNC: 12.2 G/DL — SIGNIFICANT CHANGE UP (ref 12–16)
IMM GRANULOCYTES NFR BLD AUTO: 0.4 % — HIGH (ref 0.1–0.3)
LIDOCAIN IGE QN: 42 U/L — SIGNIFICANT CHANGE UP (ref 7–60)
LYMPHOCYTES # BLD AUTO: 1.65 K/UL — SIGNIFICANT CHANGE UP (ref 1.2–3.4)
LYMPHOCYTES # BLD AUTO: 31 % — SIGNIFICANT CHANGE UP (ref 20.5–51.1)
MAGNESIUM SERPL-MCNC: 2.1 MG/DL — SIGNIFICANT CHANGE UP (ref 1.8–2.4)
MCHC RBC-ENTMCNC: 32.4 PG — HIGH (ref 27–31)
MCHC RBC-ENTMCNC: 32.7 G/DL — SIGNIFICANT CHANGE UP (ref 32–37)
MCV RBC AUTO: 98.9 FL — SIGNIFICANT CHANGE UP (ref 81–99)
MONOCYTES # BLD AUTO: 0.61 K/UL — HIGH (ref 0.1–0.6)
MONOCYTES NFR BLD AUTO: 11.5 % — HIGH (ref 1.7–9.3)
NEUTROPHILS # BLD AUTO: 2.84 K/UL — SIGNIFICANT CHANGE UP (ref 1.4–6.5)
NEUTROPHILS NFR BLD AUTO: 53.3 % — SIGNIFICANT CHANGE UP (ref 42.2–75.2)
NRBC # BLD: 0 /100 WBCS — SIGNIFICANT CHANGE UP (ref 0–0)
NT-PROBNP SERPL-SCNC: 264 PG/ML — SIGNIFICANT CHANGE UP (ref 0–300)
PLATELET # BLD AUTO: 218 K/UL — SIGNIFICANT CHANGE UP (ref 130–400)
PMV BLD: 11.6 FL — HIGH (ref 7.4–10.4)
POTASSIUM SERPL-MCNC: SIGNIFICANT CHANGE UP MMOL/L (ref 3.5–5)
POTASSIUM SERPL-SCNC: SIGNIFICANT CHANGE UP MMOL/L (ref 3.5–5)
PROT SERPL-MCNC: 6.5 G/DL — SIGNIFICANT CHANGE UP (ref 6–8)
RBC # BLD: 3.77 M/UL — LOW (ref 4.2–5.4)
RBC # FLD: 13.4 % — SIGNIFICANT CHANGE UP (ref 11.5–14.5)
SODIUM SERPL-SCNC: 137 MMOL/L — SIGNIFICANT CHANGE UP (ref 135–146)
TROPONIN T, HIGH SENSITIVITY RESULT: 15 NG/L — HIGH (ref 6–13)
TROPONIN T, HIGH SENSITIVITY RESULT: 16 NG/L — HIGH (ref 6–13)
WBC # BLD: 5.32 K/UL — SIGNIFICANT CHANGE UP (ref 4.8–10.8)
WBC # FLD AUTO: 5.32 K/UL — SIGNIFICANT CHANGE UP (ref 4.8–10.8)

## 2024-07-20 PROCEDURE — 84484 ASSAY OF TROPONIN QUANT: CPT

## 2024-07-20 PROCEDURE — 96374 THER/PROPH/DIAG INJ IV PUSH: CPT

## 2024-07-20 PROCEDURE — 93005 ELECTROCARDIOGRAM TRACING: CPT

## 2024-07-20 PROCEDURE — 83690 ASSAY OF LIPASE: CPT

## 2024-07-20 PROCEDURE — 80053 COMPREHEN METABOLIC PANEL: CPT

## 2024-07-20 PROCEDURE — 36415 COLL VENOUS BLD VENIPUNCTURE: CPT

## 2024-07-20 PROCEDURE — 93010 ELECTROCARDIOGRAM REPORT: CPT

## 2024-07-20 PROCEDURE — 99285 EMERGENCY DEPT VISIT HI MDM: CPT | Mod: 25

## 2024-07-20 PROCEDURE — 83880 ASSAY OF NATRIURETIC PEPTIDE: CPT

## 2024-07-20 PROCEDURE — 85025 COMPLETE CBC W/AUTO DIFF WBC: CPT

## 2024-07-20 PROCEDURE — 71045 X-RAY EXAM CHEST 1 VIEW: CPT

## 2024-07-20 PROCEDURE — 83735 ASSAY OF MAGNESIUM: CPT

## 2024-07-20 PROCEDURE — 71045 X-RAY EXAM CHEST 1 VIEW: CPT | Mod: 26

## 2024-07-20 PROCEDURE — 99285 EMERGENCY DEPT VISIT HI MDM: CPT

## 2024-07-20 RX ORDER — DIPHENHYDRAMINE HYDROCHLORIDE AND LIDOCAINE HYDROCHLORIDE AND ALUMINUM HYDROXIDE AND MAGNESIUM HYDRO
30 KIT ONCE
Refills: 0 | Status: COMPLETED | OUTPATIENT
Start: 2024-07-20 | End: 2024-07-20

## 2024-07-20 RX ORDER — FAMOTIDINE 40 MG
20 TABLET ORAL ONCE
Refills: 0 | Status: COMPLETED | OUTPATIENT
Start: 2024-07-20 | End: 2024-07-20

## 2024-07-20 RX ORDER — FAMOTIDINE 40 MG
1 TABLET ORAL
Qty: 5 | Refills: 0
Start: 2024-07-20 | End: 2024-07-24

## 2024-07-20 RX ADMIN — DIPHENHYDRAMINE HYDROCHLORIDE AND LIDOCAINE HYDROCHLORIDE AND ALUMINUM HYDROXIDE AND MAGNESIUM HYDRO 30 MILLILITER(S): KIT at 05:28

## 2024-07-20 RX ADMIN — Medication 20 MILLIGRAM(S): at 05:00

## 2024-07-20 NOTE — ED PROVIDER NOTE - OBJECTIVE STATEMENT
Patient is a 74-year-old female with a past medical history of A-fib on Eliquis, CAD with 2 stents, GERD, hyperlipidemia, hypertension presenting to the ED with chest pain.  Patient states around dinnertime she started to experience upper epigastric pain which she thought was acid reflux.  Patient taken an acid but the pain was not relieved.  Patient states the pain then spread to her substernal region.  Pain is described as substernal, nonradiating and fluctuating in severity.  Patient is unsure if this is the same pain she fell in the past.  No nausea, vomiting, fevers, chills, shortness of breath, recent illnesses, recent travel

## 2024-07-20 NOTE — ED ADULT TRIAGE NOTE - STATUS:
(1) Other Medications/None How Severe Are Your Spot(S)?: moderate Have Your Spot(S) Been Treated In The Past?: has not been treated Hpi Title: Evaluation of a Skin Lesion Additional History: Spot only Intact

## 2024-07-20 NOTE — ED PROVIDER NOTE - PATIENT PORTAL LINK FT
You can access the FollowMyHealth Patient Portal offered by [facility] by registering at the following website: [email or web address]. By joining [employer]’s FollowMyHealth portal, you will also be able to view your health information using other applications (apps) compatible with our system.

## 2024-07-20 NOTE — ED PROVIDER NOTE - PROGRESS NOTE DETAILS
MAYRA Kapoor MD:  Signout received from Dr. Vang at this time second troponin negative.  Patient reevaluated, states improvement of chest pain.  Patient instructions.  Follow-up with cardiologist for further workup.  Informed open return precautions.

## 2024-07-20 NOTE — ED PROVIDER NOTE - CLINICAL SUMMARY MEDICAL DECISION MAKING FREE TEXT BOX
74-year-old history of hypertension CAD with stents coming here for chest pain like symptoms.  Onset multiple hours ago.  Recent stress test done which was normal.  2 set troponin negative.  Will discharge to cardiology.    all results d/w pt & copies given, strict return precautions discussed, rec outpt      Independently interpreted by Andie Rodgers MD:  XR interpretation: No cardiopulmonary disease,   EKG interpretation: no ANNMARIE, NSR

## 2024-07-20 NOTE — ED PROVIDER NOTE - DIFFERENTIAL DIAGNOSIS
Differential includes but is not limited to :    Acute coronary syndrome, Stable angina , Pneumonia Viral pleuritis. GERD.Costochondritis.Anxiety or panic disorder, Aortic dissection, myocarditis, pericarditis, zoster Differential Diagnosis

## 2024-07-20 NOTE — ED ADULT TRIAGE NOTE - CHIEF COMPLAINT QUOTE
Pt presents to the ED c/o of SOB and chest pressure starting today. Pt h/x of afib and x1 cardiac stent. PT scheduled to have loop recorder placed August 6th.

## 2024-07-20 NOTE — ED PROVIDER NOTE - ATTENDING CONTRIBUTION TO CARE
74-year-old female with history of HTN, HLD, GERD, CAD with 2 stents, A-fib on Eliquis presents for evaluation of chest pressure, tightness and heartburn symptoms.  Onset was several hours prior to arrival.  Symptoms associated with shortness of breath.  Also notes that she has been having increased emotionally swelling.  Denies any fever, coughing, rhinorrhea, nausea, vomiting, diarrhea.  VSS, non toxic appearing, NAD, Head NCAT, MMM, neck supple, normal ROM, normal s1s2, lungs ctab, abd s/nt/nd, no guarding or rebound, extremities wnl, AAO x 3, GCS 15, neuro grossly normal. No acute skin lesions. Plan is IV, monitor, EKG, labs, and chest x-ray and reassess.

## 2024-08-12 ENCOUNTER — APPOINTMENT (OUTPATIENT)
Dept: CARDIOLOGY | Facility: CLINIC | Age: 74
End: 2024-08-12
Payer: MEDICARE

## 2024-08-12 PROCEDURE — 99441: CPT

## 2024-08-15 ENCOUNTER — APPOINTMENT (OUTPATIENT)
Dept: ELECTROPHYSIOLOGY | Facility: CLINIC | Age: 74
End: 2024-08-15

## 2024-08-22 ENCOUNTER — OUTPATIENT (OUTPATIENT)
Dept: OUTPATIENT SERVICES | Facility: HOSPITAL | Age: 74
LOS: 1 days | End: 2024-08-22
Payer: MEDICARE

## 2024-08-22 ENCOUNTER — APPOINTMENT (OUTPATIENT)
Age: 74
End: 2024-08-22

## 2024-08-22 ENCOUNTER — LABORATORY RESULT (OUTPATIENT)
Age: 74
End: 2024-08-22

## 2024-08-22 VITALS
HEIGHT: 61 IN | BODY MASS INDEX: 34.55 KG/M2 | TEMPERATURE: 97.9 F | DIASTOLIC BLOOD PRESSURE: 85 MMHG | SYSTOLIC BLOOD PRESSURE: 149 MMHG | WEIGHT: 183 LBS | HEART RATE: 72 BPM

## 2024-08-22 DIAGNOSIS — Z95.5 PRESENCE OF CORONARY ANGIOPLASTY IMPLANT AND GRAFT: Chronic | ICD-10-CM

## 2024-08-22 DIAGNOSIS — Z90.49 ACQUIRED ABSENCE OF OTHER SPECIFIED PARTS OF DIGESTIVE TRACT: Chronic | ICD-10-CM

## 2024-08-22 DIAGNOSIS — Z90.710 ACQUIRED ABSENCE OF BOTH CERVIX AND UTERUS: Chronic | ICD-10-CM

## 2024-08-22 DIAGNOSIS — C56.2 MALIGNANT NEOPLASM OF LEFT OVARY: ICD-10-CM

## 2024-08-22 DIAGNOSIS — D64.9 ANEMIA, UNSPECIFIED: ICD-10-CM

## 2024-08-22 LAB
HCT VFR BLD CALC: 34.2 %
HGB BLD-MCNC: 11.7 G/DL
MCHC RBC-ENTMCNC: 32.1 PG
MCHC RBC-ENTMCNC: 34.2 G/DL
MCV RBC AUTO: 93.7 FL
PLATELET # BLD AUTO: 234 K/UL
PMV BLD: 10 FL
RBC # BLD: 3.65 M/UL
RBC # FLD: 13 %
WBC # FLD AUTO: 3.59 K/UL

## 2024-08-22 PROCEDURE — 80053 COMPREHEN METABOLIC PANEL: CPT

## 2024-08-22 PROCEDURE — 86304 IMMUNOASSAY TUMOR CA 125: CPT

## 2024-08-22 PROCEDURE — 99213 OFFICE O/P EST LOW 20 MIN: CPT

## 2024-08-22 PROCEDURE — 85027 COMPLETE CBC AUTOMATED: CPT

## 2024-08-22 NOTE — OB HISTORY
[Currently In Menopause] : currently in menopause
[Currently In Menopause] : currently in menopause
never used

## 2024-08-23 ENCOUNTER — APPOINTMENT (OUTPATIENT)
Dept: ELECTROPHYSIOLOGY | Facility: CLINIC | Age: 74
End: 2024-08-23

## 2024-08-23 DIAGNOSIS — D64.9 ANEMIA, UNSPECIFIED: ICD-10-CM

## 2024-08-23 LAB
ALBUMIN SERPL ELPH-MCNC: 4 G/DL
ALP BLD-CCNC: 70 U/L
ALT SERPL-CCNC: 19 U/L
ANION GAP SERPL CALC-SCNC: 13 MMOL/L
AST SERPL-CCNC: 26 U/L
BILIRUB SERPL-MCNC: 2.2 MG/DL
BUN SERPL-MCNC: 21 MG/DL
CALCIUM SERPL-MCNC: 9.3 MG/DL
CANCER AG125 SERPL-ACNC: 10 U/ML
CHLORIDE SERPL-SCNC: 105 MMOL/L
CO2 SERPL-SCNC: 22 MMOL/L
CREAT SERPL-MCNC: 0.7 MG/DL
EGFR: 91 ML/MIN/1.73M2
GLUCOSE SERPL-MCNC: 119 MG/DL
POTASSIUM SERPL-SCNC: 4.6 MMOL/L
PROT SERPL-MCNC: 6.5 G/DL
SODIUM SERPL-SCNC: 140 MMOL/L

## 2024-08-27 NOTE — HISTORY OF PRESENT ILLNESS
[de-identified] : 70 year old female with PMH of CAD, Afib on eliquis, DM, HTN, DLD is here to discuss adjuvant therapy for new diagnosis of ovarian cancer. \par  \par   Patient had episode of chest pain in August for which a CTA was performed. An incidental finding revealed a left adnexal mass. She then saw her OBGYN. A pelvic ultrasound was done, as well as a dedicated CT of Abdomen and Pelvis that showed a 8.3 x 8.8 x 7.4 cm left adnexal mass with solid components. 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 - was 15 prior to surgery. \par  \par  She has recovered well from surgery. She does not have any c/o abdominal pain, weight loss, GOPI. No c/o chest pain/ SOB. \par  She is very active and lives with her family at home. \par  She takes eliquis and ASA 81 for CAD and Afib \par  She was getting B12 inj with PMD for low B12 \par  \par  H/o Colon cancer in 3 maternal aunts \par  Last Mammogram and Colonoscopy- 2-3 months normal \par  Radiology- 08/20- CTA- No CTA evidence of aortic dissection or intramural hematoma. 5.3 cm left adnexal cystic lesion. Recommend ultrasound prior to gadolinium enhanced MRI of the pelvis for further evaluation on an outpatient basis.\par  \par  11/20- MRI pelvis- Complex cystic lesion lt adnexa with hemorrhagic debris. No pelvic or inguinal adenopathy. \par  \par   \par  Pathology: Final Diagnosis\par  1. Left tube and ovary (left pelvic mass):\par  - Left ovary, high grade papillary serous carcinoma.\par  - (The ovarian surface is involved by tumor).\par  - Ovarian fibroma / thecoma\par  - Fallopian tube, paratubal cyst\par  \par  2. Cervix, uterus, right tube and ovary:\par  - Uterine cervix, reactive changes\par  - Endometrium, mainly atrophic\par  - Uterus, leiomyoma\par  - Right ovary, no involvement by carcinoma is seen.\par  - Fallopian tube, paratubal cyst\par  \par  3. Portion of omentum:\par  - Omental adipose tissue, no involvement by carcinoma is seen.\par  \par  4. Left pelvic nodes:\par  - Lymph nodes, no metastatic carcinoma is seen (0/3).\par  \par  \par  PELVIC FLUID:\par  - POSITIVE FOR MALIGNANT CELLS.\par  - Adenocarcinoma.\par  \par  \par  PROCEDURE: Total hysterectomy and bilateral salpingo-\par  oophorectomy, omentum, lymph nodes\par  HYSTERECTOMY TYPE: Laparoscopic\par  SPECIMEN INTEGRITY OF RIGHT OVARY: Capsule intact\par  SPECIMEN INTEGRITY OF LEFT OVARY: Capsule intact\par  SPECIMEN INTEGRITY OF RIGHT FALLOPIAN TUBE: Serosa intact\par  SPECIMEN INTEGRITY OF LEFT FALLOPIAN TUBE: Serosa intact\par  TUMOR SITE: Left ovary\par  OVARIAN SURFACE INVOLVEMENT: Present\par  FALLOPIAN TUBE SURFACE INVOLVEMENT: Absent\par  TUMOR SIZE: Greatest dimension: 3.5 cm (solid /papillary\par  component)\par  Additional dimensions: 2.6 x 1.4 cm\par  HISTOLOGIC TYPE: Serous carcinoma\par  HISTOLOGIC GRADE: WHO GRADING SYSTEM: G3: Poorly differentiated\par  TWO TIER GRADING SYSTEM: High grade\par  IMPLANTS: n/a\par  OTHER TISSUE/ORGAN INVOLVEMENT: Not identified\par  LARGEST EXTRAPELVIC PERITONEAL FOCUS: n/a\par  PERITONEAL / ASCITIC FLUID: Malignant\par  TREATMENT EFFECT: No known presurgical therapy\par  REGIONAL LYMPH NODES: All lymph nodes negative for tumor cells\par  Number of lymph nodes examined: 3\par  PATHOLOGIC STAGE CLASSIFICATION\par  PRIMARY TUMOR: pT1c3: Malignant cells in ascites or\par  peritoneal washings and ovarian surface involvement\par  REGIONAL LYMPH NODES: pN0\par  FIGO STAGE: IC3: Malignant cells present in the ascites or\par  peritoneal washings\par  ADDITIONAL PATHOLOGIC FINDINGS: ovarian fibroma/thecoma, uterine\par  leiomyoma\par  \par  \par  \par  \par   [de-identified] : 1/14/2021 Pt is here to f/u for ovarian cancer, S/P Carbo/Taxol cycle 1 and Zarxio x 5 days. Pt is doing well today, denies N/V/D, no chest pain, no abdominal pain, no urinary symptoms.  Pt reports intermittent mild spotting. Pt had 1 episode of nausea after the chemo and resolved after taking Loperamide, + numbness/tingling of bilateral feet on 1/1/21 with relief from pain meds, + chest discomfort on D3 of Zarxio, went to cardio Dr Teran and EKG was normal. Pt had decreased appetite  during the chemo but had improved this week.   2/8/21 pt is here for follow up. S/p 2 cycles of chemo. She had developed mild neuropathy and was started on Gabapentin. Now on Gabapentin, she has improvement. I discussed possible dose reduction however pt does not want to reduce the dose. No abd pain, N, V, D  3/8/21  Pt presented today for routine f/u and treatment . She reports that her neuropathy has improved with gabapentin but sfter treatment she gets worsening of symptoms for 5 days . She is not sure whether its arthritis or neuropathy .  Denies any abdominal pain nausea or vomiting .  She is s/p 3 cycles , other then neuropathy , tolerating treatment well.   3/29/21  Pt presented today for f/u and to receive treatment . She reports feeling fine . Her neuropathy is not getting worse but she does c/o some tingling and numbness in fingers .  No c/o nausea , vomiting or diarrhea . Tolerating treatment well. She was encouraged to receive covid vaccine .   4/19/21 Pt presented for f/u and treatment today . She is s/p 5 cycles of carbo/ taxol for Stage 1C3 serous ovarian cancer . Tolerating treatment well except tingling and numbness , which she reports comes and goes .  No c/o nausea ., vomiting or diarrhea .  She will receive covid vaccine after finishing treatment . Requires GCSF support with chemo .   6/24/2021 Pt is here to follow up for ovarian cancer She is s/p 6 cycles of carbo/taxol She feels well today, appetite is good She denies abdominal pain, nausea, vomiting or diarrhea She still have mild neuropathy on her fingertips and feet She received COVID vaccine x 2 doses  12/20/21 Pt is here for follow up. Her neuropathy has improved. She has been taking alpha lipoic acid. No abd pain, N, v, d, vag bleeding  4/25/22 Pt is here for follow up. Pt denies abdominal pain or bloating, denies vaginal bleeding or discharge.UTD with mammo, GYN and GI  9/12/22 Pt is here for follow up. Feels somewhat more fatigued. had labs done by Dr Siddharth montiel and found to be anemic with Hgb of 10.6, WBC 3.3 MCV 96, plts 197K No bleeding reported  10/6/22 Patient here for follow up visit to discuss recent anemia work up results. She is feeling well, has no new complaints.  9/14/23 Pt is here for follow up visit. Feels well. UTD with GI and GYN  1/9/24 Patient is here to follow up for ovarian cancer. She feels well, denies abdominal pain, nausea, vomiting or any vaginal bleeding or abnormal vaginal discharge.  8/22/24 Patient is here to follow up for ovarian cancer. She feels well, denies abdominal pain, nausea, vomiting or any vaginal bleeding or abnormal vaginal discharge. CT scan A/P done 4/22/24

## 2024-08-27 NOTE — HISTORY OF PRESENT ILLNESS
[de-identified] : 70 year old female with PMH of CAD, Afib on eliquis, DM, HTN, DLD is here to discuss adjuvant therapy for new diagnosis of ovarian cancer. \par  \par   Patient had episode of chest pain in August for which a CTA was performed. An incidental finding revealed a left adnexal mass. She then saw her OBGYN. A pelvic ultrasound was done, as well as a dedicated CT of Abdomen and Pelvis that showed a 8.3 x 8.8 x 7.4 cm left adnexal mass with solid components. 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 - was 15 prior to surgery. \par  \par  She has recovered well from surgery. She does not have any c/o abdominal pain, weight loss, GOPI. No c/o chest pain/ SOB. \par  She is very active and lives with her family at home. \par  She takes eliquis and ASA 81 for CAD and Afib \par  She was getting B12 inj with PMD for low B12 \par  \par  H/o Colon cancer in 3 maternal aunts \par  Last Mammogram and Colonoscopy- 2-3 months normal \par  Radiology- 08/20- CTA- No CTA evidence of aortic dissection or intramural hematoma. 5.3 cm left adnexal cystic lesion. Recommend ultrasound prior to gadolinium enhanced MRI of the pelvis for further evaluation on an outpatient basis.\par  \par  11/20- MRI pelvis- Complex cystic lesion lt adnexa with hemorrhagic debris. No pelvic or inguinal adenopathy. \par  \par   \par  Pathology: Final Diagnosis\par  1. Left tube and ovary (left pelvic mass):\par  - Left ovary, high grade papillary serous carcinoma.\par  - (The ovarian surface is involved by tumor).\par  - Ovarian fibroma / thecoma\par  - Fallopian tube, paratubal cyst\par  \par  2. Cervix, uterus, right tube and ovary:\par  - Uterine cervix, reactive changes\par  - Endometrium, mainly atrophic\par  - Uterus, leiomyoma\par  - Right ovary, no involvement by carcinoma is seen.\par  - Fallopian tube, paratubal cyst\par  \par  3. Portion of omentum:\par  - Omental adipose tissue, no involvement by carcinoma is seen.\par  \par  4. Left pelvic nodes:\par  - Lymph nodes, no metastatic carcinoma is seen (0/3).\par  \par  \par  PELVIC FLUID:\par  - POSITIVE FOR MALIGNANT CELLS.\par  - Adenocarcinoma.\par  \par  \par  PROCEDURE: Total hysterectomy and bilateral salpingo-\par  oophorectomy, omentum, lymph nodes\par  HYSTERECTOMY TYPE: Laparoscopic\par  SPECIMEN INTEGRITY OF RIGHT OVARY: Capsule intact\par  SPECIMEN INTEGRITY OF LEFT OVARY: Capsule intact\par  SPECIMEN INTEGRITY OF RIGHT FALLOPIAN TUBE: Serosa intact\par  SPECIMEN INTEGRITY OF LEFT FALLOPIAN TUBE: Serosa intact\par  TUMOR SITE: Left ovary\par  OVARIAN SURFACE INVOLVEMENT: Present\par  FALLOPIAN TUBE SURFACE INVOLVEMENT: Absent\par  TUMOR SIZE: Greatest dimension: 3.5 cm (solid /papillary\par  component)\par  Additional dimensions: 2.6 x 1.4 cm\par  HISTOLOGIC TYPE: Serous carcinoma\par  HISTOLOGIC GRADE: WHO GRADING SYSTEM: G3: Poorly differentiated\par  TWO TIER GRADING SYSTEM: High grade\par  IMPLANTS: n/a\par  OTHER TISSUE/ORGAN INVOLVEMENT: Not identified\par  LARGEST EXTRAPELVIC PERITONEAL FOCUS: n/a\par  PERITONEAL / ASCITIC FLUID: Malignant\par  TREATMENT EFFECT: No known presurgical therapy\par  REGIONAL LYMPH NODES: All lymph nodes negative for tumor cells\par  Number of lymph nodes examined: 3\par  PATHOLOGIC STAGE CLASSIFICATION\par  PRIMARY TUMOR: pT1c3: Malignant cells in ascites or\par  peritoneal washings and ovarian surface involvement\par  REGIONAL LYMPH NODES: pN0\par  FIGO STAGE: IC3: Malignant cells present in the ascites or\par  peritoneal washings\par  ADDITIONAL PATHOLOGIC FINDINGS: ovarian fibroma/thecoma, uterine\par  leiomyoma\par  \par  \par  \par  \par   [de-identified] : 1/14/2021 Pt is here to f/u for ovarian cancer, S/P Carbo/Taxol cycle 1 and Zarxio x 5 days. Pt is doing well today, denies N/V/D, no chest pain, no abdominal pain, no urinary symptoms.  Pt reports intermittent mild spotting. Pt had 1 episode of nausea after the chemo and resolved after taking Loperamide, + numbness/tingling of bilateral feet on 1/1/21 with relief from pain meds, + chest discomfort on D3 of Zarxio, went to cardio Dr Teran and EKG was normal. Pt had decreased appetite  during the chemo but had improved this week.   2/8/21 pt is here for follow up. S/p 2 cycles of chemo. She had developed mild neuropathy and was started on Gabapentin. Now on Gabapentin, she has improvement. I discussed possible dose reduction however pt does not want to reduce the dose. No abd pain, N, V, D  3/8/21  Pt presented today for routine f/u and treatment . She reports that her neuropathy has improved with gabapentin but sfter treatment she gets worsening of symptoms for 5 days . She is not sure whether its arthritis or neuropathy .  Denies any abdominal pain nausea or vomiting .  She is s/p 3 cycles , other then neuropathy , tolerating treatment well.   3/29/21  Pt presented today for f/u and to receive treatment . She reports feeling fine . Her neuropathy is not getting worse but she does c/o some tingling and numbness in fingers .  No c/o nausea , vomiting or diarrhea . Tolerating treatment well. She was encouraged to receive covid vaccine .   4/19/21 Pt presented for f/u and treatment today . She is s/p 5 cycles of carbo/ taxol for Stage 1C3 serous ovarian cancer . Tolerating treatment well except tingling and numbness , which she reports comes and goes .  No c/o nausea ., vomiting or diarrhea .  She will receive covid vaccine after finishing treatment . Requires GCSF support with chemo .   6/24/2021 Pt is here to follow up for ovarian cancer She is s/p 6 cycles of carbo/taxol She feels well today, appetite is good She denies abdominal pain, nausea, vomiting or diarrhea She still have mild neuropathy on her fingertips and feet She received COVID vaccine x 2 doses  12/20/21 Pt is here for follow up. Her neuropathy has improved. She has been taking alpha lipoic acid. No abd pain, N, v, d, vag bleeding  4/25/22 Pt is here for follow up. Pt denies abdominal pain or bloating, denies vaginal bleeding or discharge.UTD with mammo, GYN and GI  9/12/22 Pt is here for follow up. Feels somewhat more fatigued. had labs done by Dr Siddharth montiel and found to be anemic with Hgb of 10.6, WBC 3.3 MCV 96, plts 197K No bleeding reported  10/6/22 Patient here for follow up visit to discuss recent anemia work up results. She is feeling well, has no new complaints.  9/14/23 Pt is here for follow up visit. Feels well. UTD with GI and GYN  1/9/24 Patient is here to follow up for ovarian cancer. She feels well, denies abdominal pain, nausea, vomiting or any vaginal bleeding or abnormal vaginal discharge.  8/22/24 Patient is here to follow up for ovarian cancer. She feels well, denies abdominal pain, nausea, vomiting or any vaginal bleeding or abnormal vaginal discharge. CT scan A/P done 4/22/24

## 2024-08-27 NOTE — ASSESSMENT
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 - VINICIUS at this time. - Labs today: CBC, CMP and . Previous labs reviewed. -CT scan A/P done 4/22/24 showed no evidence of acute intra-abdominal pathology. - Mammogram on3/26/24 was negative. - Follow up with gyn/onc as scheduled.  # NCNC anemia: work up showing only proteinuria, which may be secondary to long history of DM. - Anemia resolved.  - Continue to follow with GI. - Follow up with gyn, PCP and GI for health maintenance as recommended.  All her concerns were discussed during the visit.  RTC in 4 Mmonths.

## 2024-10-17 ENCOUNTER — APPOINTMENT (OUTPATIENT)
Dept: CARDIOLOGY | Facility: CLINIC | Age: 74
End: 2024-10-17
Payer: MEDICARE

## 2024-10-17 PROCEDURE — 93880 EXTRACRANIAL BILAT STUDY: CPT

## 2024-10-17 PROCEDURE — 93970 EXTREMITY STUDY: CPT

## 2024-10-18 ENCOUNTER — APPOINTMENT (OUTPATIENT)
Dept: ELECTROPHYSIOLOGY | Facility: CLINIC | Age: 74
End: 2024-10-18

## 2024-10-24 ENCOUNTER — APPOINTMENT (OUTPATIENT)
Dept: CARDIOLOGY | Facility: CLINIC | Age: 74
End: 2024-10-24
Payer: MEDICARE

## 2024-10-24 VITALS
SYSTOLIC BLOOD PRESSURE: 138 MMHG | DIASTOLIC BLOOD PRESSURE: 80 MMHG | HEART RATE: 55 BPM | BODY MASS INDEX: 34.74 KG/M2 | OXYGEN SATURATION: 97 % | HEIGHT: 61 IN | WEIGHT: 184 LBS

## 2024-10-24 DIAGNOSIS — I25.10 ATHEROSCLEROTIC HEART DISEASE OF NATIVE CORONARY ARTERY W/OUT ANGINA PECTORIS: ICD-10-CM

## 2024-10-24 DIAGNOSIS — E11.9 TYPE 2 DIABETES MELLITUS W/OUT COMPLICATIONS: ICD-10-CM

## 2024-10-24 PROCEDURE — 93000 ELECTROCARDIOGRAM COMPLETE: CPT

## 2024-10-24 PROCEDURE — ZZZZZ: CPT

## 2024-10-24 PROCEDURE — 99214 OFFICE O/P EST MOD 30 MIN: CPT | Mod: 25

## 2024-10-29 ENCOUNTER — APPOINTMENT (OUTPATIENT)
Dept: ELECTROPHYSIOLOGY | Facility: CLINIC | Age: 74
End: 2024-10-29

## 2024-10-29 VITALS
BODY MASS INDEX: 34.36 KG/M2 | DIASTOLIC BLOOD PRESSURE: 93 MMHG | WEIGHT: 182 LBS | HEIGHT: 61 IN | SYSTOLIC BLOOD PRESSURE: 159 MMHG | HEART RATE: 62 BPM | TEMPERATURE: 97.1 F

## 2024-10-29 DIAGNOSIS — Z86.79 PERSONAL HISTORY OF OTHER DISEASES OF THE CIRCULATORY SYSTEM: ICD-10-CM

## 2024-10-29 DIAGNOSIS — R00.1 BRADYCARDIA, UNSPECIFIED: ICD-10-CM

## 2024-10-29 DIAGNOSIS — I48.0 PAROXYSMAL ATRIAL FIBRILLATION: ICD-10-CM

## 2024-10-29 PROCEDURE — G2211 COMPLEX E/M VISIT ADD ON: CPT

## 2024-10-29 PROCEDURE — 99214 OFFICE O/P EST MOD 30 MIN: CPT

## 2024-10-29 PROCEDURE — 93000 ELECTROCARDIOGRAM COMPLETE: CPT

## 2024-11-21 ENCOUNTER — APPOINTMENT (OUTPATIENT)
Dept: CARDIOLOGY | Facility: CLINIC | Age: 74
End: 2024-11-21
Payer: MEDICARE

## 2024-11-21 PROCEDURE — 99441: CPT

## 2024-11-25 ENCOUNTER — NON-APPOINTMENT (OUTPATIENT)
Age: 74
End: 2024-11-25

## 2024-12-03 ENCOUNTER — APPOINTMENT (OUTPATIENT)
Dept: GYNECOLOGIC ONCOLOGY | Facility: CLINIC | Age: 74
End: 2024-12-03
Payer: MEDICARE

## 2024-12-03 VITALS
HEART RATE: 95 BPM | DIASTOLIC BLOOD PRESSURE: 55 MMHG | HEIGHT: 61 IN | WEIGHT: 182 LBS | BODY MASS INDEX: 34.36 KG/M2 | SYSTOLIC BLOOD PRESSURE: 156 MMHG

## 2024-12-03 DIAGNOSIS — N95.2 POSTMENOPAUSAL ATROPHIC VAGINITIS: ICD-10-CM

## 2024-12-03 DIAGNOSIS — C56.2 MALIGNANT NEOPLASM OF LEFT OVARY: ICD-10-CM

## 2024-12-03 PROCEDURE — 99459 PELVIC EXAMINATION: CPT

## 2024-12-03 PROCEDURE — 99213 OFFICE O/P EST LOW 20 MIN: CPT

## 2024-12-19 ENCOUNTER — OUTPATIENT (OUTPATIENT)
Dept: OUTPATIENT SERVICES | Facility: HOSPITAL | Age: 74
LOS: 1 days | End: 2024-12-19
Payer: MEDICARE

## 2024-12-19 ENCOUNTER — APPOINTMENT (OUTPATIENT)
Age: 74
End: 2024-12-19

## 2024-12-19 ENCOUNTER — LABORATORY RESULT (OUTPATIENT)
Age: 74
End: 2024-12-19

## 2024-12-19 VITALS
HEIGHT: 61 IN | TEMPERATURE: 98.1 F | BODY MASS INDEX: 33.99 KG/M2 | WEIGHT: 180 LBS | RESPIRATION RATE: 17 BRPM | OXYGEN SATURATION: 100 % | HEART RATE: 74 BPM | DIASTOLIC BLOOD PRESSURE: 86 MMHG | SYSTOLIC BLOOD PRESSURE: 146 MMHG

## 2024-12-19 DIAGNOSIS — D64.9 ANEMIA, UNSPECIFIED: ICD-10-CM

## 2024-12-19 DIAGNOSIS — C56.2 MALIGNANT NEOPLASM OF LEFT OVARY: ICD-10-CM

## 2024-12-19 DIAGNOSIS — Z90.49 ACQUIRED ABSENCE OF OTHER SPECIFIED PARTS OF DIGESTIVE TRACT: Chronic | ICD-10-CM

## 2024-12-19 DIAGNOSIS — Z95.5 PRESENCE OF CORONARY ANGIOPLASTY IMPLANT AND GRAFT: Chronic | ICD-10-CM

## 2024-12-19 DIAGNOSIS — Z90.710 ACQUIRED ABSENCE OF BOTH CERVIX AND UTERUS: Chronic | ICD-10-CM

## 2024-12-19 LAB
ALBUMIN SERPL ELPH-MCNC: 4 G/DL
ALP BLD-CCNC: 69 U/L
ALT SERPL-CCNC: 15 U/L
ANION GAP SERPL CALC-SCNC: 9 MMOL/L
AST SERPL-CCNC: 21 U/L
BILIRUB SERPL-MCNC: 2.4 MG/DL
BUN SERPL-MCNC: 19 MG/DL
CALCIUM SERPL-MCNC: 9.5 MG/DL
CHLORIDE SERPL-SCNC: 105 MMOL/L
CO2 SERPL-SCNC: 28 MMOL/L
CREAT SERPL-MCNC: 0.7 MG/DL
EGFR: 91 ML/MIN/1.73M2
GLUCOSE SERPL-MCNC: 96 MG/DL
HCT VFR BLD CALC: 35.4 %
HGB BLD-MCNC: 11.9 G/DL
MCHC RBC-ENTMCNC: 31.2 PG
MCHC RBC-ENTMCNC: 33.6 G/DL
MCV RBC AUTO: 92.7 FL
PLATELET # BLD AUTO: 217 K/UL
PMV BLD: 10.2 FL
POTASSIUM SERPL-SCNC: 4.5 MMOL/L
PROT SERPL-MCNC: 6.9 G/DL
RBC # BLD: 3.82 M/UL
RBC # FLD: 14.1 %
SODIUM SERPL-SCNC: 142 MMOL/L
WBC # FLD AUTO: 3.84 K/UL

## 2024-12-19 PROCEDURE — 86304 IMMUNOASSAY TUMOR CA 125: CPT

## 2024-12-19 PROCEDURE — 84466 ASSAY OF TRANSFERRIN: CPT

## 2024-12-19 PROCEDURE — 83540 ASSAY OF IRON: CPT

## 2024-12-19 PROCEDURE — 36415 COLL VENOUS BLD VENIPUNCTURE: CPT

## 2024-12-19 PROCEDURE — 99214 OFFICE O/P EST MOD 30 MIN: CPT

## 2024-12-19 PROCEDURE — 80053 COMPREHEN METABOLIC PANEL: CPT

## 2024-12-19 PROCEDURE — 85027 COMPLETE CBC AUTOMATED: CPT

## 2024-12-19 PROCEDURE — 83550 IRON BINDING TEST: CPT

## 2024-12-19 PROCEDURE — 82728 ASSAY OF FERRITIN: CPT

## 2024-12-20 DIAGNOSIS — D64.9 ANEMIA, UNSPECIFIED: ICD-10-CM

## 2024-12-20 LAB
CANCER AG125 SERPL-ACNC: 8 U/ML
FERRITIN SERPL-MCNC: 33 NG/ML
IRON SATN MFR SERPL: 34 %
IRON SERPL-MCNC: 101 UG/DL
TIBC SERPL-MCNC: 298 UG/DL
TRANSFERRIN SERPL-MCNC: 269 MG/DL
UIBC SERPL-MCNC: 197 UG/DL

## 2025-01-02 ENCOUNTER — NON-APPOINTMENT (OUTPATIENT)
Age: 75
End: 2025-01-02

## 2025-01-02 ENCOUNTER — APPOINTMENT (OUTPATIENT)
Dept: ELECTROPHYSIOLOGY | Facility: CLINIC | Age: 75
End: 2025-01-02
Payer: MEDICARE

## 2025-01-02 VITALS
SYSTOLIC BLOOD PRESSURE: 138 MMHG | HEART RATE: 88 BPM | BODY MASS INDEX: 34.17 KG/M2 | DIASTOLIC BLOOD PRESSURE: 78 MMHG | WEIGHT: 181 LBS | HEIGHT: 61 IN

## 2025-01-02 DIAGNOSIS — Z86.79 PERSONAL HISTORY OF OTHER DISEASES OF THE CIRCULATORY SYSTEM: ICD-10-CM

## 2025-01-02 DIAGNOSIS — I48.0 PAROXYSMAL ATRIAL FIBRILLATION: ICD-10-CM

## 2025-01-02 DIAGNOSIS — R00.1 BRADYCARDIA, UNSPECIFIED: ICD-10-CM

## 2025-01-02 PROCEDURE — 99214 OFFICE O/P EST MOD 30 MIN: CPT

## 2025-01-02 PROCEDURE — 93000 ELECTROCARDIOGRAM COMPLETE: CPT

## 2025-01-02 PROCEDURE — G2211 COMPLEX E/M VISIT ADD ON: CPT

## 2025-01-14 ENCOUNTER — NON-APPOINTMENT (OUTPATIENT)
Age: 75
End: 2025-01-14

## 2025-02-14 ENCOUNTER — APPOINTMENT (OUTPATIENT)
Dept: CARDIOLOGY | Facility: CLINIC | Age: 75
End: 2025-02-14
Payer: MEDICARE

## 2025-02-14 VITALS
SYSTOLIC BLOOD PRESSURE: 120 MMHG | BODY MASS INDEX: 33.99 KG/M2 | HEIGHT: 61 IN | WEIGHT: 180 LBS | DIASTOLIC BLOOD PRESSURE: 70 MMHG | HEART RATE: 81 BPM

## 2025-02-14 DIAGNOSIS — I25.10 ATHEROSCLEROTIC HEART DISEASE OF NATIVE CORONARY ARTERY W/OUT ANGINA PECTORIS: ICD-10-CM

## 2025-02-14 DIAGNOSIS — R53.83 OTHER FATIGUE: ICD-10-CM

## 2025-02-14 DIAGNOSIS — Z86.79 PERSONAL HISTORY OF OTHER DISEASES OF THE CIRCULATORY SYSTEM: ICD-10-CM

## 2025-02-14 DIAGNOSIS — R06.83 SNORING: ICD-10-CM

## 2025-02-14 DIAGNOSIS — E78.5 HYPERLIPIDEMIA, UNSPECIFIED: ICD-10-CM

## 2025-02-14 DIAGNOSIS — I48.0 PAROXYSMAL ATRIAL FIBRILLATION: ICD-10-CM

## 2025-02-14 PROCEDURE — G2211 COMPLEX E/M VISIT ADD ON: CPT

## 2025-02-14 PROCEDURE — 93000 ELECTROCARDIOGRAM COMPLETE: CPT

## 2025-02-14 PROCEDURE — 99214 OFFICE O/P EST MOD 30 MIN: CPT

## 2025-02-19 ENCOUNTER — OUTPATIENT (OUTPATIENT)
Dept: OUTPATIENT SERVICES | Facility: HOSPITAL | Age: 75
LOS: 1 days | Discharge: HOME | End: 2025-02-19
Payer: MEDICARE

## 2025-02-19 DIAGNOSIS — Z95.5 PRESENCE OF CORONARY ANGIOPLASTY IMPLANT AND GRAFT: Chronic | ICD-10-CM

## 2025-02-19 PROCEDURE — 95800 SLP STDY UNATTENDED: CPT

## 2025-02-19 PROCEDURE — 95800 SLP STDY UNATTENDED: CPT | Mod: 26

## 2025-02-25 ENCOUNTER — OUTPATIENT (OUTPATIENT)
Dept: OUTPATIENT SERVICES | Facility: HOSPITAL | Age: 75
LOS: 1 days | Discharge: HOME | End: 2025-02-25
Payer: MEDICARE

## 2025-02-25 VITALS
RESPIRATION RATE: 20 BRPM | HEART RATE: 76 BPM | OXYGEN SATURATION: 99 % | DIASTOLIC BLOOD PRESSURE: 60 MMHG | SYSTOLIC BLOOD PRESSURE: 127 MMHG

## 2025-02-25 VITALS
DIASTOLIC BLOOD PRESSURE: 63 MMHG | SYSTOLIC BLOOD PRESSURE: 143 MMHG | RESPIRATION RATE: 20 BRPM | HEART RATE: 75 BPM | OXYGEN SATURATION: 100 %

## 2025-02-25 DIAGNOSIS — Z90.710 ACQUIRED ABSENCE OF BOTH CERVIX AND UTERUS: Chronic | ICD-10-CM

## 2025-02-25 DIAGNOSIS — Z95.5 PRESENCE OF CORONARY ANGIOPLASTY IMPLANT AND GRAFT: Chronic | ICD-10-CM

## 2025-02-25 DIAGNOSIS — Z90.49 ACQUIRED ABSENCE OF OTHER SPECIFIED PARTS OF DIGESTIVE TRACT: Chronic | ICD-10-CM

## 2025-02-25 DIAGNOSIS — I48.19 OTHER PERSISTENT ATRIAL FIBRILLATION: ICD-10-CM

## 2025-02-25 PROCEDURE — 33285 INSJ SUBQ CAR RHYTHM MNTR: CPT

## 2025-02-25 PROCEDURE — C1764: CPT

## 2025-02-25 RX ORDER — CEPHALEXIN 250 MG/1
500 CAPSULE ORAL ONCE
Refills: 0 | Status: COMPLETED | OUTPATIENT
Start: 2025-02-25 | End: 2025-02-25

## 2025-02-25 RX ADMIN — CEPHALEXIN 500 MILLIGRAM(S): 250 CAPSULE ORAL at 08:06

## 2025-02-25 NOTE — H&P ADULT - HISTORY OF PRESENT ILLNESS
74F with pAFib (Eliquis), CAD s/p LAD PCI 2002, HTN, T2DM, DLD, chronic fatigue syndrome here for ILR implantation

## 2025-02-25 NOTE — H&P ADULT - NSHPPHYSICALEXAM_GEN_ALL_CORE
GENERAL:  73y/o Female NAD, resting comfortably.  HEAD:  Atraumatic, Normocephalic  EYES: EOMI, PERRLA, conjunctiva and sclera clear  NECK: Supple, No JVD, no cervical lymphadenopathy, non-tender  CHEST/LUNG: Clear to auscultation bilaterally; No wheeze, rhonchi, or rales  HEART: Regular rate and rhythm; S1&S2  ABDOMEN: Soft, Nontender, Nondistended x 4 quadrants; Bowel sounds present  EXTREMITIES:   Peripheral Pulses Present, No clubbing, no cyanosis, or no edema, no calf tenderness  PSYCH: AAOx3, cooperative, appropriate  NEUROLOGY: WNL  SKIN: WNL

## 2025-02-25 NOTE — CHART NOTE - NSCHARTNOTEFT_GEN_A_CORE
Electrophysiology Brief Post-Op Note    I have personally seen and examined the patient.  I agree with the history and physical which I have reviewed and noted any changes below.  02-25-25 @ 09:10    PRE-OP DIAGNOSIS:  / AF management    POST-OP DIAGNOSIS:  / AFmanagement    PROCEDURE: Loop Implant    Physician: Dr. Forman  Assistant: GAVINO Rausch    ESTIMATED BLOOD LOSS:  2  mL    ANESTHESIA TYPE:  [  ]General Anesthesia  [  ] Sedation  [X  ] Local/Regional    CONDITION  [  ] Critical  [  ] Serious  [  ]Fair  [ X ]Good    SPECIMENS REMOVED (IF APPLICABLE):  none    IMPLANTS (IF APPLICABLE)  Loop Recorder ([employer]) [id number]  R wave amplitude 0.38mV    FINDINGS  PLAN OF CARE  - F/U 3-4 weeks  - May remove bandaid in 2 days

## 2025-02-26 DIAGNOSIS — I48.91 UNSPECIFIED ATRIAL FIBRILLATION: ICD-10-CM

## 2025-03-04 DIAGNOSIS — G47.33 OBSTRUCTIVE SLEEP APNEA (ADULT) (PEDIATRIC): ICD-10-CM

## 2025-03-05 DIAGNOSIS — G47.33 OBSTRUCTIVE SLEEP APNEA (ADULT) (PEDIATRIC): ICD-10-CM

## 2025-03-20 ENCOUNTER — APPOINTMENT (OUTPATIENT)
Dept: ELECTROPHYSIOLOGY | Facility: CLINIC | Age: 75
End: 2025-03-20

## 2025-03-20 ENCOUNTER — NON-APPOINTMENT (OUTPATIENT)
Age: 75
End: 2025-03-20

## 2025-03-20 VITALS
WEIGHT: 179 LBS | HEART RATE: 84 BPM | BODY MASS INDEX: 33.79 KG/M2 | SYSTOLIC BLOOD PRESSURE: 126 MMHG | DIASTOLIC BLOOD PRESSURE: 70 MMHG | HEIGHT: 61 IN

## 2025-03-20 DIAGNOSIS — I48.0 PAROXYSMAL ATRIAL FIBRILLATION: ICD-10-CM

## 2025-03-20 DIAGNOSIS — Z48.89 ENCOUNTER FOR OTHER SPECIFIED SURGICAL AFTERCARE: ICD-10-CM

## 2025-03-20 DIAGNOSIS — Z45.09 ENCOUNTER FOR ADJUSTMENT AND MANAGEMENT OF OTHER CARDIAC DEVICE: ICD-10-CM

## 2025-03-20 PROCEDURE — 93291 INTERROG DEV EVAL SCRMS IP: CPT

## 2025-03-20 PROCEDURE — 99214 OFFICE O/P EST MOD 30 MIN: CPT

## 2025-03-21 RX ORDER — MULTIVIT WITH MIN/ALA/HERBS 50 MG
TABLET ORAL
Refills: 0 | Status: ACTIVE | COMMUNITY

## 2025-03-21 RX ORDER — ASPIRIN 81 MG/1
81 TABLET ORAL DAILY
Refills: 0 | Status: ACTIVE | COMMUNITY

## 2025-03-26 ENCOUNTER — APPOINTMENT (OUTPATIENT)
Dept: PULMONOLOGY | Facility: CLINIC | Age: 75
End: 2025-03-26
Payer: MEDICARE

## 2025-03-26 DIAGNOSIS — G47.33 OBSTRUCTIVE SLEEP APNEA (ADULT) (PEDIATRIC): ICD-10-CM

## 2025-03-26 PROCEDURE — 99213 OFFICE O/P EST LOW 20 MIN: CPT

## 2025-04-21 ENCOUNTER — APPOINTMENT (OUTPATIENT)
Dept: CARDIOLOGY | Facility: CLINIC | Age: 75
End: 2025-04-21
Payer: MEDICARE

## 2025-04-21 ENCOUNTER — NON-APPOINTMENT (OUTPATIENT)
Age: 75
End: 2025-04-21

## 2025-04-21 ENCOUNTER — APPOINTMENT (OUTPATIENT)
Age: 75
End: 2025-04-21
Payer: MEDICARE

## 2025-04-21 ENCOUNTER — OUTPATIENT (OUTPATIENT)
Dept: OUTPATIENT SERVICES | Facility: HOSPITAL | Age: 75
LOS: 1 days | End: 2025-04-21
Payer: MEDICARE

## 2025-04-21 VITALS
HEART RATE: 77 BPM | DIASTOLIC BLOOD PRESSURE: 82 MMHG | HEIGHT: 61 IN | SYSTOLIC BLOOD PRESSURE: 136 MMHG | BODY MASS INDEX: 33.04 KG/M2 | WEIGHT: 175 LBS | OXYGEN SATURATION: 99 % | TEMPERATURE: 97.7 F

## 2025-04-21 DIAGNOSIS — C56.2 MALIGNANT NEOPLASM OF LEFT OVARY: ICD-10-CM

## 2025-04-21 DIAGNOSIS — D64.9 ANEMIA, UNSPECIFIED: ICD-10-CM

## 2025-04-21 DIAGNOSIS — Z90.710 ACQUIRED ABSENCE OF BOTH CERVIX AND UTERUS: Chronic | ICD-10-CM

## 2025-04-21 DIAGNOSIS — Z95.5 PRESENCE OF CORONARY ANGIOPLASTY IMPLANT AND GRAFT: Chronic | ICD-10-CM

## 2025-04-21 DIAGNOSIS — Z90.49 ACQUIRED ABSENCE OF OTHER SPECIFIED PARTS OF DIGESTIVE TRACT: Chronic | ICD-10-CM

## 2025-04-21 LAB
AUTO BASOPHILS #: 0.01 K/UL
AUTO BASOPHILS %: 0.3 %
AUTO EOSINOPHILS #: 0.06 K/UL
AUTO EOSINOPHILS %: 1.7 %
AUTO IMMATURE GRANULOCYTES #: 0 K/UL
AUTO LYMPHOCYTES #: 1.41 K/UL
AUTO LYMPHOCYTES %: 40.6 %
AUTO MONOCYTES #: 0.34 K/UL
AUTO MONOCYTES %: 9.8 %
AUTO NEUTROPHILS #: 1.65 K/UL
AUTO NEUTROPHILS %: 47.6 %
AUTO NRBC #: 0 K/UL
HCT VFR BLD CALC: 35 %
HGB BLD-MCNC: 11.9 G/DL
IMM GRANULOCYTES NFR BLD AUTO: 0 %
MAN DIFF?: NORMAL
MCHC RBC-ENTMCNC: 31.6 PG
MCHC RBC-ENTMCNC: 34 G/DL
MCV RBC AUTO: 93.1 FL
PLATELET # BLD AUTO: 228 K/UL
PMV BLD AUTO: 0 /100 WBCS
PMV BLD: 10.1 FL
RBC # BLD: 3.76 M/UL
RBC # FLD: 13.2 %
WBC # FLD AUTO: 3.47 K/UL

## 2025-04-21 PROCEDURE — 36415 COLL VENOUS BLD VENIPUNCTURE: CPT

## 2025-04-21 PROCEDURE — 99213 OFFICE O/P EST LOW 20 MIN: CPT

## 2025-04-21 PROCEDURE — 85025 COMPLETE CBC W/AUTO DIFF WBC: CPT

## 2025-04-21 PROCEDURE — 80053 COMPREHEN METABOLIC PANEL: CPT

## 2025-04-21 PROCEDURE — 86304 IMMUNOASSAY TUMOR CA 125: CPT

## 2025-04-21 PROCEDURE — 93298 REM INTERROG DEV EVAL SCRMS: CPT

## 2025-04-22 DIAGNOSIS — D64.9 ANEMIA, UNSPECIFIED: ICD-10-CM

## 2025-04-22 LAB
ALBUMIN SERPL ELPH-MCNC: 4.2 G/DL
ALP BLD-CCNC: 63 U/L
ALT SERPL-CCNC: 15 U/L
ANION GAP SERPL CALC-SCNC: 5 MMOL/L
AST SERPL-CCNC: 22 U/L
BILIRUB SERPL-MCNC: 2 MG/DL
BUN SERPL-MCNC: 23 MG/DL
CALCIUM SERPL-MCNC: 9.9 MG/DL
CANCER AG125 SERPL-ACNC: 8 U/ML
CHLORIDE SERPL-SCNC: 103 MMOL/L
CO2 SERPL-SCNC: 32 MMOL/L
CREAT SERPL-MCNC: 0.8 MG/DL
EGFRCR SERPLBLD CKD-EPI 2021: 77 ML/MIN/1.73M2
GLUCOSE SERPL-MCNC: 123 MG/DL
POTASSIUM SERPL-SCNC: 4.6 MMOL/L
PROT SERPL-MCNC: 6.8 G/DL
SODIUM SERPL-SCNC: 140 MMOL/L

## 2025-04-28 NOTE — PHYSICAL EXAM
Called patient. And was notified and stated today is her last day of antibiotic. And she thinks it has curbed it but not taken it totally away and stated this antibiotic. Usually works for her   [Fully active, able to carry on all pre-disease performance without restriction] : Status 0 - Fully active, able to carry on all pre-disease performance without restriction [Obese] : obese [Normal] : affect appropriate

## 2025-05-05 ENCOUNTER — APPOINTMENT (OUTPATIENT)
Dept: CARDIOLOGY | Facility: CLINIC | Age: 75
End: 2025-05-05
Payer: MEDICARE

## 2025-05-05 PROCEDURE — 93306 TTE W/DOPPLER COMPLETE: CPT

## 2025-05-22 ENCOUNTER — APPOINTMENT (OUTPATIENT)
Dept: CARDIOLOGY | Facility: CLINIC | Age: 75
End: 2025-05-22
Payer: MEDICARE

## 2025-05-22 VITALS
HEIGHT: 61 IN | DIASTOLIC BLOOD PRESSURE: 80 MMHG | BODY MASS INDEX: 33.23 KG/M2 | WEIGHT: 176 LBS | SYSTOLIC BLOOD PRESSURE: 130 MMHG

## 2025-05-22 DIAGNOSIS — E11.9 TYPE 2 DIABETES MELLITUS W/OUT COMPLICATIONS: ICD-10-CM

## 2025-05-22 DIAGNOSIS — I48.0 PAROXYSMAL ATRIAL FIBRILLATION: ICD-10-CM

## 2025-05-22 DIAGNOSIS — E78.5 HYPERLIPIDEMIA, UNSPECIFIED: ICD-10-CM

## 2025-05-22 PROCEDURE — 99214 OFFICE O/P EST MOD 30 MIN: CPT

## 2025-05-22 PROCEDURE — G2211 COMPLEX E/M VISIT ADD ON: CPT

## 2025-05-23 ENCOUNTER — APPOINTMENT (OUTPATIENT)
Dept: CARDIOLOGY | Facility: CLINIC | Age: 75
End: 2025-05-23

## 2025-05-23 PROCEDURE — 93298 REM INTERROG DEV EVAL SCRMS: CPT

## 2025-06-03 ENCOUNTER — APPOINTMENT (OUTPATIENT)
Dept: GYNECOLOGIC ONCOLOGY | Facility: CLINIC | Age: 75
End: 2025-06-03
Payer: MEDICARE

## 2025-06-03 VITALS
WEIGHT: 176 LBS | SYSTOLIC BLOOD PRESSURE: 141 MMHG | DIASTOLIC BLOOD PRESSURE: 85 MMHG | HEART RATE: 79 BPM | BODY MASS INDEX: 33.23 KG/M2 | HEIGHT: 61 IN

## 2025-06-03 DIAGNOSIS — C56.2 MALIGNANT NEOPLASM OF LEFT OVARY: ICD-10-CM

## 2025-06-03 PROCEDURE — 99459 PELVIC EXAMINATION: CPT

## 2025-06-03 PROCEDURE — 99213 OFFICE O/P EST LOW 20 MIN: CPT

## 2025-06-24 ENCOUNTER — APPOINTMENT (OUTPATIENT)
Dept: CARDIOLOGY | Facility: CLINIC | Age: 75
End: 2025-06-24
Payer: MEDICARE

## 2025-06-24 PROCEDURE — 93925 LOWER EXTREMITY STUDY: CPT

## 2025-06-27 ENCOUNTER — APPOINTMENT (OUTPATIENT)
Dept: CARDIOLOGY | Facility: CLINIC | Age: 75
End: 2025-06-27

## 2025-06-27 PROCEDURE — 93298 REM INTERROG DEV EVAL SCRMS: CPT

## 2025-07-15 ENCOUNTER — RX RENEWAL (OUTPATIENT)
Age: 75
End: 2025-07-15

## 2025-07-21 ENCOUNTER — RX RENEWAL (OUTPATIENT)
Age: 75
End: 2025-07-21

## 2025-07-24 ENCOUNTER — APPOINTMENT (OUTPATIENT)
Dept: ELECTROPHYSIOLOGY | Facility: CLINIC | Age: 75
End: 2025-07-24
Payer: MEDICARE

## 2025-07-24 VITALS
HEIGHT: 61 IN | HEART RATE: 80 BPM | DIASTOLIC BLOOD PRESSURE: 68 MMHG | SYSTOLIC BLOOD PRESSURE: 118 MMHG | WEIGHT: 174 LBS | BODY MASS INDEX: 32.85 KG/M2

## 2025-07-24 DIAGNOSIS — I48.0 PAROXYSMAL ATRIAL FIBRILLATION: ICD-10-CM

## 2025-07-24 DIAGNOSIS — Z79.01 LONG TERM (CURRENT) USE OF ANTICOAGULANTS: ICD-10-CM

## 2025-07-24 DIAGNOSIS — Z45.09 ENCOUNTER FOR ADJUSTMENT AND MANAGEMENT OF OTHER CARDIAC DEVICE: ICD-10-CM

## 2025-07-24 PROCEDURE — 93285 PRGRMG DEV EVAL SCRMS IP: CPT

## 2025-07-24 PROCEDURE — 99214 OFFICE O/P EST MOD 30 MIN: CPT

## 2025-08-25 ENCOUNTER — APPOINTMENT (OUTPATIENT)
Dept: CARDIOLOGY | Facility: CLINIC | Age: 75
End: 2025-08-25
Payer: MEDICARE

## 2025-08-25 ENCOUNTER — NON-APPOINTMENT (OUTPATIENT)
Age: 75
End: 2025-08-25

## 2025-08-25 PROCEDURE — 93298 REM INTERROG DEV EVAL SCRMS: CPT

## 2025-08-26 ENCOUNTER — APPOINTMENT (OUTPATIENT)
Age: 75
End: 2025-08-26
Payer: MEDICARE

## 2025-08-26 VITALS
SYSTOLIC BLOOD PRESSURE: 127 MMHG | HEART RATE: 89 BPM | TEMPERATURE: 98.5 F | HEIGHT: 61 IN | OXYGEN SATURATION: 97 % | BODY MASS INDEX: 32.66 KG/M2 | DIASTOLIC BLOOD PRESSURE: 81 MMHG | WEIGHT: 173 LBS

## 2025-08-26 DIAGNOSIS — C56.2 MALIGNANT NEOPLASM OF LEFT OVARY: ICD-10-CM

## 2025-08-26 LAB
ALBUMIN SERPL ELPH-MCNC: 4.1 G/DL
ALP BLD-CCNC: 69 U/L
ALT SERPL-CCNC: 12 U/L
ANION GAP SERPL CALC-SCNC: 12 MMOL/L
AST SERPL-CCNC: 20 U/L
AUTO BASOPHILS #: 0.04 K/UL
AUTO BASOPHILS %: 0.9 %
AUTO EOSINOPHILS #: 0.13 K/UL
AUTO EOSINOPHILS %: 3 %
AUTO IMMATURE GRANULOCYTES #: 0.01 K/UL
AUTO LYMPHOCYTES #: 1.25 K/UL
AUTO LYMPHOCYTES %: 28.4 %
AUTO MONOCYTES #: 0.47 K/UL
AUTO MONOCYTES %: 10.7 %
AUTO NEUTROPHILS #: 2.5 K/UL
AUTO NEUTROPHILS %: 56.8 %
AUTO NRBC #: 0 K/UL
BILIRUB SERPL-MCNC: 1.6 MG/DL
BUN SERPL-MCNC: 12 MG/DL
CALCIUM SERPL-MCNC: 9.2 MG/DL
CHLORIDE SERPL-SCNC: 104 MMOL/L
CO2 SERPL-SCNC: 23 MMOL/L
CREAT SERPL-MCNC: 0.9 MG/DL
EGFRCR SERPLBLD CKD-EPI 2021: 67 ML/MIN/1.73M2
GLUCOSE SERPL-MCNC: 204 MG/DL
HCT VFR BLD CALC: 33.6 %
HGB BLD-MCNC: 11.5 G/DL
IMM GRANULOCYTES NFR BLD AUTO: 0.2 %
MAN DIFF?: NORMAL
MCHC RBC-ENTMCNC: 31.1 PG
MCHC RBC-ENTMCNC: 34.2 G/DL
MCV RBC AUTO: 90.8 FL
PLATELET # BLD AUTO: 274 K/UL
PMV BLD AUTO: 0 /100 WBCS
PMV BLD: 9.4 FL
POTASSIUM SERPL-SCNC: 4.2 MMOL/L
PROT SERPL-MCNC: 6.6 G/DL
RBC # BLD: 3.7 M/UL
RBC # FLD: 13.4 %
SODIUM SERPL-SCNC: 139 MMOL/L
WBC # FLD AUTO: 4.4 K/UL

## 2025-08-26 PROCEDURE — 99214 OFFICE O/P EST MOD 30 MIN: CPT

## 2025-08-27 LAB — CANCER AG125 SERPL-ACNC: 10 U/ML
